# Patient Record
Sex: FEMALE | Race: WHITE | Employment: FULL TIME | URBAN - METROPOLITAN AREA
[De-identification: names, ages, dates, MRNs, and addresses within clinical notes are randomized per-mention and may not be internally consistent; named-entity substitution may affect disease eponyms.]

---

## 2017-06-16 RX ORDER — DROSPIRENONE AND ETHINYL ESTRADIOL 0.02-3(28)
KIT ORAL
Qty: 84 TAB | Refills: 0 | Status: SHIPPED | OUTPATIENT
Start: 2017-06-16 | End: 2019-07-19

## 2019-07-18 NOTE — PATIENT INSTRUCTIONS
Well Visit, Ages 25 to 48: Care Instructions  Your Care Instructions    Physical exams can help you stay healthy. Your doctor has checked your overall health and may have suggested ways to take good care of yourself. He or she also may have recommended tests. At home, you can help prevent illness with healthy eating, regular exercise, and other steps. Follow-up care is a key part of your treatment and safety. Be sure to make and go to all appointments, and call your doctor if you are having problems. It's also a good idea to know your test results and keep a list of the medicines you take. How can you care for yourself at home? · Reach and stay at a healthy weight. This will lower your risk for many problems, such as obesity, diabetes, heart disease, and high blood pressure. · Get at least 30 minutes of physical activity on most days of the week. Walking is a good choice. You also may want to do other activities, such as running, swimming, cycling, or playing tennis or team sports. Discuss any changes in your exercise program with your doctor. · Do not smoke or allow others to smoke around you. If you need help quitting, talk to your doctor about stop-smoking programs and medicines. These can increase your chances of quitting for good. · Talk to your doctor about whether you have any risk factors for sexually transmitted infections (STIs). Having one sex partner (who does not have STIs and does not have sex with anyone else) is a good way to avoid these infections. · Use birth control if you do not want to have children at this time. Talk with your doctor about the choices available and what might be best for you. · Protect your skin from too much sun. When you're outdoors from 10 a.m. to 4 p.m., stay in the shade or cover up with clothing and a hat with a wide brim. Wear sunglasses that block UV rays. Even when it's cloudy, put broad-spectrum sunscreen (SPF 30 or higher) on any exposed skin.   · See a dentist one or two times a year for checkups and to have your teeth cleaned. · Wear a seat belt in the car. · Drink alcohol in moderation, if at all. That means no more than 2 drinks a day for men and 1 drink a day for women. Follow your doctor's advice about when to have certain tests. These tests can spot problems early. For everyone  · Cholesterol. Have the fat (cholesterol) in your blood tested after age 21. Your doctor will tell you how often to have this done based on your age, family history, or other things that can increase your risk for heart disease. · Blood pressure. Have your blood pressure checked during a routine doctor visit. Your doctor will tell you how often to check your blood pressure based on your age, your blood pressure results, and other factors. · Vision. Talk with your doctor about how often to have a glaucoma test.  · Diabetes. Ask your doctor whether you should have tests for diabetes. · Colon cancer. Have a test for colon cancer at age 48. You may have one of several tests. If you are younger than 48, you may need a test earlier if you have any risk factors. Risk factors include whether you already had a precancerous polyp removed from your colon or whether your parent, brother, sister, or child has had colon cancer. For women  · Breast exam and mammogram. Talk to your doctor about when you should have a clinical breast exam and a mammogram. Medical experts differ on whether and how often women under 50 should have these tests. Your doctor can help you decide what is right for you. · Pap test and pelvic exam. Begin Pap tests at age 24. A Pap test is the best way to find cervical cancer. The test often is part of a pelvic exam. Ask how often to have this test.  · Tests for sexually transmitted infections (STIs). Ask whether you should have tests for STIs. You may be at risk if you have sex with more than one person, especially if your partners do not wear condoms.   For men  · Tests for sexually transmitted infections (STIs). Ask whether you should have tests for STIs. You may be at risk if you have sex with more than one person, especially if you do not wear a condom. · Testicular cancer exam. Ask your doctor whether you should check your testicles regularly. · Prostate exam. Talk to your doctor about whether you should have a blood test (called a PSA test) for prostate cancer. Experts differ on whether and when men should have this test. Some experts suggest it if you are older than 39 and are -American or have a father or brother who got prostate cancer when he was younger than 72. When should you call for help? Watch closely for changes in your health, and be sure to contact your doctor if you have any problems or symptoms that concern you. Where can you learn more? Go to http://amauri-keyla.info/. Enter P072 in the search box to learn more about \"Well Visit, Ages 25 to 48: Care Instructions. \"  Current as of: March 28, 2018  Content Version: 11.9  © 5729-9540 [employer]. Care instructions adapted under license by [employer] (which disclaims liability or warranty for this information). If you have questions about a medical condition or this instruction, always ask your healthcare professional. [place] [address] any warranty or liability for your use of this information. Learning About Infertility Testing  What is infertility testing? Infertility tests help find out why a woman cannot get pregnant. These tests include a physical exam, semen analysis, blood tests, and other procedures. Many of these tests are done in your doctor's office or clinic. Some other procedures may be done in a hospital.  What happens during testing? The first tests done include:  · Physical exam and medical history (both partners). · Blood or urine tests (both partners).  These tests check hormones and look for sexually transmitted infections. · Semen analysis (the man). This test checks the number of sperm and how they move. If the first tests don't find a problem, the woman may have one or more of these tests:  · Ultrasound. A pelvic ultrasound looks at the size and structure of the uterus and ovaries. · X-ray. A special kind of X-ray looks at the inside of the uterus and the fallopian tubes. · Laparoscopy. The doctor puts a thin, lighted scope through a small cut in the belly to see the organs inside. If the problem is still not found, other tests and procedures may be done. What can you expect after this testing? Sometimes tests can't find the problem. And not all infertility problems can be treated. But you may still be able to get pregnant with techniques like in vitro fertilization. What else should you know? Infertility tests can cost a lot. And they can be stressful and take a lot of time. Before you have these tests, talk with your partner about them. In some cases, you may not find a problem even after many tests. So it is important to decide how many tests you might want to try. Where can you learn more? Go to http://amauri-keyla.info/. Enter M460 in the search box to learn more about \"Learning About Infertility Testing. \"  Current as of: September 5, 2018  Content Version: 11.9  © 4776-1696 [employer]. Care instructions adapted under license by [employer] (which disclaims liability or warranty for this information). If you have questions about a medical condition or this instruction, always ask your healthcare professional. [place] [address] any warranty or liability for your use of this information.

## 2019-07-19 ENCOUNTER — OFFICE VISIT (OUTPATIENT)
Dept: OBGYN CLINIC | Age: 29
End: 2019-07-19

## 2019-07-19 VITALS
BODY MASS INDEX: 29.12 KG/M2 | HEIGHT: 65 IN | WEIGHT: 174.8 LBS | DIASTOLIC BLOOD PRESSURE: 72 MMHG | SYSTOLIC BLOOD PRESSURE: 114 MMHG

## 2019-07-19 DIAGNOSIS — R33.9 INCOMPLETE EMPTYING OF BLADDER: ICD-10-CM

## 2019-07-19 DIAGNOSIS — N89.8 VAGINA ITCHING: ICD-10-CM

## 2019-07-19 DIAGNOSIS — N97.9 INFERTILITY, FEMALE: ICD-10-CM

## 2019-07-19 DIAGNOSIS — Z01.411 ENCOUNTER FOR GYNECOLOGICAL EXAMINATION (GENERAL) (ROUTINE) WITH ABNORMAL FINDINGS: Primary | ICD-10-CM

## 2019-07-19 DIAGNOSIS — Z12.4 SCREENING FOR CERVICAL CANCER: ICD-10-CM

## 2019-07-19 LAB
BILIRUB UR QL STRIP: NEGATIVE
GLUCOSE UR-MCNC: NEGATIVE MG/DL
KETONES P FAST UR STRIP-MCNC: NEGATIVE MG/DL
PH UR STRIP: 5 [PH] (ref 4.6–8)
PROT UR QL STRIP: NEGATIVE
SP GR UR STRIP: 1 (ref 1–1.03)
UA UROBILINOGEN AMB POC: NORMAL (ref 0.2–1)
URINALYSIS CLARITY POC: CLEAR
URINALYSIS COLOR POC: NORMAL
URINE BLOOD POC: NEGATIVE
URINE LEUKOCYTES POC: NEGATIVE
URINE NITRITES POC: NEGATIVE
WET MOUNT POCT, WMPOCT: NORMAL

## 2019-07-19 RX ORDER — ATOMOXETINE 60 MG/1
60 CAPSULE ORAL DAILY
COMMUNITY
End: 2020-04-26

## 2019-07-19 NOTE — PROGRESS NOTES
[facility]  [email or web address]  [phone]    Carmen Orellana MD, FACOG       OB/GYN Problem visit    Chief Complaint:   Chief Complaint   Patient presents with    Well Woman     concerns about infertility for 2 years    Vaginal Itching     occassional vaginal itching after intercourse     Incomplete Bladder Emptying     \"always\"    Family Planning       History of Present Illness: This is a new problem being evaluated by this provider. The patient is a 26 y.o. [de-identified]  female who reports has been trying to conceive for 2 years. Having regular menses ranging from every 24 to 30 days. She also c/o incomplete emptying of urine for years. Pt co vaginal itching after intercourse. ?PCOS and prediabetes when younger, but no longer prediabetic after changed diet. +OPK: x1 attempt. Partner: no children, no tob, no mumps, no testicular surgery/trauma    LMP: Patient's last menstrual period was 06/11/2019 (exact date).     PFSH:  Past Medical History:   Diagnosis Date    ADD (attention deficit disorder)     Breast lump in female     Endorses provider felt left breast lump in 2017, pt denies ever feeling breast lump at anytime    Depression     Diabetes ([address]     pre-diabetes    Hypertension     Ulcerative colitis ([address]      Past Surgical History:   Procedure Laterality Date    COLONOSCOPY,DIAGNOSTIC  1/6/2016         HX ADENOIDECTOMY      age 2 or 1    HX TYMPANOSTOMY      had tubes placed twice during childhood    HX WISDOM TEETH EXTRACTION  2006     Family History   Problem Relation Age of Onset    Hypertension Father     Diabetes Father     Psychiatric Disorder Father     Psychiatric Disorder Mother     Diabetes Mother     Hypertension Mother     High Cholesterol Mother     Cancer Maternal Grandmother         lung    Psychiatric Disorder Maternal Grandmother     Heart Disease Maternal Grandmother     Diabetes Paternal Grandfather     Heart Disease Paternal Grandfather    Abena Boubacar Syndrome Maternal Uncle     Downs Syndrome Cousin      Social History     Tobacco Use    Smoking status: Never Smoker    Smokeless tobacco: Never Used   Substance Use Topics    Alcohol use: Yes     Alcohol/week: 0.8 standard drinks     Types: 1 Shots of liquor per week     Comment: occasional    Drug use: No     Allergies   Allergen Reactions    Augmentin [Amoxicillin-Pot Clavulanate] Other (comments)     Diarrhea, vomiting, heartburn and nausea     Current Outpatient Medications   Medication Sig    atomoxetine (STRATTERA) 60 mg capsule Take 60 mg by mouth daily.  amLODIPine (NORVASC) 2.5 mg tablet TAKE 1 TABLET BY MOUTH EVERY DAY     No current facility-administered medications for this visit. Review of Systems:  History obtained from the patient  Constitutional: negative for fevers, chills and weight loss  ENT ROS: negative for - hearing change, oral lesions or visual changes  Respiratory: negative for cough, wheezing or dyspnea on exertion  Cardiovascular: negative for chest pain, irregular heart beats, exertional chest pressure/discomfort  Gastrointestinal: negative for dysphagia, nausea and vomiting  Genito-Urinary ROS:  see HPI  Inteument/breast: negative for rash, breast lump and nipple discharge  Musculoskeletal:negative for stiff joints, neck pain and muscle weakness  Endocrine ROS: negative for - breast changes, galactorrhea or temperature intolerance  Hematological and Lymphatic ROS: negative for - blood clots, bruising or swollen lymph nodes    Physical Exam:  Visit Vitals  /72 (BP 1 Location: Left arm, BP Patient Position: Sitting)   Ht 5' 5\" (1.651 m)   Wt 174 lb 12.8 oz (79.3 kg)   BMI 29.09 kg/m²       GENERAL: alert, well appearing, and in no distress  HEAD: normocephalic, atraumatic.    PULM: clear to auscultation, no wheezes, rales or rhonchi, symmetric air entry   COR: normal rate and regular rhythm, S1 and S2 normal   ABDOMEN: soft, nontender, nondistended, no masses or organomegaly   EGBUS: no lesions, no inflammation, no masses  VULVA: normal appearing vulva with no masses, tenderness or lesions  VAGINA: normal appearing vagina with normal color, no lesions, white thin discharge  CERVIX: normal appearing cervix without discharge or lesions, non tender  UTERUS: uterus is normal size, shape, consistency and nontender   ADNEXA: normal adnexa in size, nontender and no masses  NEURO: alert, oriented, normal speech    Assessment:  Encounter Diagnoses   Name Primary?       Incomplete emptying of bladder     Vagina itching     Infertility, female    Dx: new problems to this provider that is moderate, data reviewed: none;  work up planned: labs, intervention: menstrual charting, timed intercourse, US, semen analysis        Plan:  The patient is advised that she should contact the office if she does not note improvement or if symptoms recur  Recommend follow up with PCP for non-gynecologic complaints and chronic medical problems. She should contact our office with any questions or concerns  She could keep her routine annual exam appointment. Disc good bladder hygiene, ur cx, consider PT. We discussed potential causes of vaginal discharge/irritation. We discussed good vulvar hygiene. Recommended avoid vaginal irritants. Discussed use of mild soaps/detergents. Follow up if NI. We reviewed wet prep findings with the patient at her visit. ACOG evaluation of infertility h/o given. Plan SA, US, day 20 progesterone. Consider PCOS labs at nv. Disc option of HSG: hold for now. We discussed timed intercourse, menstrual charting, and s/sx of ovulation. I recommended a daily prenatal vitamin. We discussed that if conception does not occur within one year then additional evaluation may be indicated.        I spent 20 minutes of face to face time counseling and discussing fertility, infertility, testing, vaginitis, urine complaints with the patient. More than 50 % of her visit was spent performing counseling. See WWE< requested same day as consult. Orders Placed This Encounter    CULTURE, URINE    PROGESTERONE    AMB POC URINALYSIS DIP STICK AUTO W/O MICRO    AMB POC WET PREP (AKA STAIN, INTERPRET, WET MOUNT)    PAP IG, CT-NG TV HPV 16&18,45 ([id number], [id number])       Results for orders placed or performed in visit on 07/19/19   CULTURE, URINE   Result Value Ref Range    Urine Culture, Routine       Culture shows less than 10,000 colony forming units of bacteria per  milliliter of urine. This colony count is not generally considered  to be clinically significant.       Narrative    Performed at:  [address] [place] [address]  [id number]  : April Mcdonald MD, Phone:  [phone]   AMB POC URINALYSIS DIP STICK AUTO W/O MICRO   Result Value Ref Range    Color (UA POC) Sherie     Clarity (UA POC) Clear     Glucose (UA POC) Negative Negative    Bilirubin (UA POC) Negative Negative    Ketones (UA POC) Negative Negative    Specific gravity (UA POC) 1.005 1.001 - 1.035    Blood (UA POC) Negative Negative    pH (UA POC) 5 4.6 - 8.0    Protein (UA POC) Negative Negative    Urobilinogen (UA POC) normal 0.2 - 1    Nitrites (UA POC) Negative Negative    Leukocyte esterase (UA POC) Negative Negative   AMB POC SMEAR, STAIN & INTERPRET, WET MOUNT   Result Value Ref Range    Wet mount (POC)      Narrative    NICOLA    Hypae: negative  Buds: negative    Wet Prep:  Trich: negative  Clue cells: negative  Hyphae: negative  Buds: negative  WBC's: normal

## 2019-07-19 NOTE — PROGRESS NOTES
[facility]  [email or web address]  [phone]    Jennifer Danielson MD, FACOG       Annual Gynecologic Exam:  WWE <40  Chief Complaint   Patient presents with    Well Woman     concerns about infertility for 2 years    Vaginal Itching     occassional vaginal itching after intercourse     Incomplete Bladder Emptying     \"always\"   [id number] Ayden Adair is a 26 y.o. No obstetric history on file. WHITE OR  female who presents for an annual well woman exam.  Patient's last menstrual period was 2019 (exact date). .    With regard to the Gardisil vaccine, she is older than the FDA approved age to receive it. She does report additional concerns today. Menstrual status:  Her periods are moderate to heavy, regular cycles, ranging from every 24-30 days. She does report dysmenorrhea/painful menses. She does not report irregular bleeding. Sexual history and Contraception:  Social History     Substance and Sexual Activity   Sexual Activity Yes    Partners: Male     She never use condoms with sexual activity  She does not reports new sexual partner(s) in the last year. The patient does not request STD testing. We recommended testing per CDC guidelines and at patient request.     Preventive Medicine History:  Her most recent Pap smear result: normal was obtained in 2016  She does not have a history of RIKKI 2, 3 or cervical cancer.      Past Medical History:   Diagnosis Date    ADD (attention deficit disorder)     Breast lump in female     Endorses provider felt left breast lump in , pt denies ever feeling breast lump at anytime    Depression     Diabetes ([address]     pre-diabetes    Hypertension     Ulcerative colitis ([address]      OB History    Para Term  AB Living   0 0 0 0 0 0   SAB TAB Ectopic Molar Multiple Live Births   0 0 0 0 0 0     Past Surgical History:   Procedure Laterality Date    COLONOSCOPY,DIAGNOSTIC  2016  HX ADENOIDECTOMY      age 2 or 1    HX TYMPANOSTOMY      had tubes placed twice during childhood    HX WISDOM TEETH EXTRACTION  2006     Family History   Problem Relation Age of Onset    Hypertension Father     Diabetes Father     Psychiatric Disorder Father     Psychiatric Disorder Mother     Diabetes Mother     Hypertension Mother     High Cholesterol Mother     Cancer Maternal Grandmother         lung    Psychiatric Disorder Maternal Grandmother     Heart Disease Maternal Grandmother     Diabetes Paternal Grandfather     Heart Disease Paternal Grandfather    Garrel Goodhue Syndrome Maternal Uncle     Downs Syndrome Cousin      Social History     Socioeconomic History    Marital status:      Spouse name: Not on file    Number of children: Not on file    Years of education: Not on file    Highest education level: Not on file   Occupational History    Not on file   Social Needs    Financial resource strain: Not on file    Food insecurity:     Worry: Not on file     Inability: Not on file    Transportation needs:     Medical: Not on file     Non-medical: Not on file   Tobacco Use    Smoking status: Never Smoker    Smokeless tobacco: Never Used   Substance and Sexual Activity    Alcohol use:  Yes     Alcohol/week: 0.8 standard drinks     Types: 1 Shots of liquor per week     Comment: occasional    Drug use: No    Sexual activity: Yes     Partners: Male   Lifestyle    Physical activity:     Days per week: Not on file     Minutes per session: Not on file    Stress: Not on file   Relationships    Social connections:     Talks on phone: Not on file     Gets together: Not on file     Attends Confucianist service: Not on file     Active member of club or organization: Not on file     Attends meetings of clubs or organizations: Not on file     Relationship status: Not on file    Intimate partner violence:     Fear of current or ex partner: Not on file     Emotionally abused: Not on file Physically abused: Not on file     Forced sexual activity: Not on file   Other Topics Concern    Not on file   Social History Narrative    Not on file       Allergies   Allergen Reactions    Augmentin [Amoxicillin-Pot Clavulanate] Other (comments)     Diarrhea, vomiting, heartburn and nausea       Current Outpatient Medications   Medication Sig    atomoxetine (STRATTERA) 60 mg capsule Take 60 mg by mouth daily.  amLODIPine (NORVASC) 2.5 mg tablet TAKE 1 TABLET BY MOUTH EVERY DAY     No current facility-administered medications for this visit.         Patient Active Problem List   Diagnosis Code    ADD (attention deficit disorder) F98.8    Depression F32.9    Sepsis ([facility] [address] A41.9       Review of Systems - History obtained from the patient  Constitutional: negative for weight loss, fever, night sweats  HEENT: positive for hearing loss, earache, congestion, snoring, sorethroat  CV: negative for chest pain, palpitations, edema  Resp: negative for cough, shortness of breath, wheezing  GI: positive for change in bowel habits, abdominal pain, black or bloody stools  : positive for frequency, dysuria, hematuria, incomplete emptying after urination \"always\"  GYN: see HPI  MSK: negative for back pain, joint pain, muscle pain  Breast: negative for breast lumps, nipple discharge, galactorrhea  Skin :negative for itching, rash, hives  Neuro: negative for dizziness, headache, confusion, weakness  Psych: negative for anxiety, depression, change in mood  Heme/lymph: negative for bleeding, bruising, pallor    Physical Exam  Visit Vitals  /72 (BP 1 Location: Left arm, BP Patient Position: Sitting)   Ht 5' 5\" (1.651 m)   Wt 174 lb 12.8 oz (79.3 kg)   LMP 06/11/2019 (Exact Date)   BMI 29.09 kg/m²       Constitutional  · Appearance: well-nourished, well developed, alert, in no acute distress    HENT  · Head and Face: appears normal    Neck  · Inspection/Palpation: normal appearance, no masses or tenderness  · Lymph Nodes: no lymphadenopathy present  · Thyroid: gland size normal, nontender, no nodules or masses present on palpation    Chest  · Respiratory Effort: breathing unlabored  · Auscultation: normal breath sounds    Cardiovascular  · Heart:  · Auscultation: regular rate and rhythm without murmur    Breasts  · Inspection of Breasts: breasts symmetrical, no skin changes, no discharge present, nipple appearance normal, no skin retraction present  · Palpation of Breasts and Axillae: no masses present on palpation, no breast tenderness  · Axillary Lymph Nodes: no lymphadenopathy present    Gastrointestinal  · Abdominal Examination: abdomen non-tender to palpation, normal bowel sounds, no masses present  · Liver and spleen: no hepatomegaly present, spleen not palpable  · Hernias: no hernias identified    Genitourinary  · External Genitalia: normal appearance for age, no discharge present, no tenderness present, no inflammatory lesions present, no masses present  · Vagina: normal vaginal vault without central or paravaginal defects, thin white discharge present, no inflammatory lesions present, no masses present  · Bladder: non-tender to palpation  · Urethra: appears normal  · Cervix: normal   · Uterus: normal size, shape and consistency  · Adnexa: no adnexal tenderness present, no adnexal masses present  · Perineum: perineum within normal limits, no evidence of trauma, no rashes or skin lesions present  · Anus: anus within normal limits, no hemorrhoids present  · Inguinal Lymph Nodes: no lymphadenopathy present    Skin  · General Inspection: no rash, no lesions identified    Neurologic/Psychiatric  · Mental Status:  · Orientation: grossly oriented to person, place and time  · Mood and Affect: mood normal, affect appropriate    Assessment:  26 y.o. No obstetric history on file. for well woman exam  Encounter Diagnoses   Name Primary?     Screening for cervical cancer     Incomplete emptying of bladder     Vagina itching     Infertility, female     Encounter for gynecological examination (general) (routine) with abnormal findings Yes       Plan:  The patient was counseled about diet, exercise, healthy lifestyle  We discussed self breast exam  We discussed safer sex practices, condom use and risk factors for sexually transmitted diseases. We discussed current pap smear and HR HPV testing guidelines. We recommend follow up one year for routine annual gynecologic exam or sooner prn  We recommend routine follow up with her primary care doctor for management of chronic medical problems and non-gynecologic concerns  Handouts were given to the patient  We discussed calcium/vitamin D/weight bearing exercise and osteoporosis prevention    See other consultation with problem note    Folllow up:  [x] return for annual well woman exam in one year or sooner if she is having problems  [] follow up and ultrasound  [] 6 months  [] 3 months  [] 6 weeks   [] 1 month    Orders Placed This Encounter    CULTURE, URINE    PROGESTERONE    AMB POC URINALYSIS DIP STICK AUTO W/O MICRO    AMB POC WET PREP (AKA STAIN, INTERPRET, WET MOUNT)    PAP IG, CT-NG TV HPV 16&18,45 ([id number], [id number])       Results for orders placed or performed in visit on 07/19/19   CULTURE, URINE   Result Value Ref Range    Urine Culture, Routine       Culture shows less than 10,000 colony forming units of bacteria per  milliliter of urine. This colony count is not generally considered  to be clinically significant.       Narrative    Performed at:  [address] [place] [address]  [id number]  : Cruz Ortega MD, Phone:  [phone]   AMB POC URINALYSIS DIP STICK AUTO W/O MICRO   Result Value Ref Range    Color (UA POC) Sherie     Clarity (UA POC) Clear     Glucose (UA POC) Negative Negative    Bilirubin (UA POC) Negative Negative    Ketones (UA POC) Negative Negative    Specific gravity (UA POC) 1.005 1.001 - 1.035    Blood (UA POC) Negative Negative    pH (UA POC) 5 4.6 - 8.0    Protein (UA POC) Negative Negative    Urobilinogen (UA POC) normal 0.2 - 1    Nitrites (UA POC) Negative Negative    Leukocyte esterase (UA POC) Negative Negative   AMB POC SMEAR, STAIN & INTERPRET, WET MOUNT   Result Value Ref Range    Wet mount (POC)      Narrative    NICOLA    Hypae: negative  Buds: negative    Wet Prep:  Trich: negative  Clue cells: negative  Hyphae: negative  Buds: negative  WBC's: normal

## 2019-07-19 NOTE — PROGRESS NOTES
[age]. No tob. No testicular problems. [age]  ? pcos/prediabetes. Left br lump  2-6 yrs.     Pap: abnl 2014-15/bacterial.    Joseph Klinefelter

## 2019-07-20 LAB — BACTERIA UR CULT: NORMAL

## 2019-07-24 LAB
C TRACH RRNA CVX QL NAA+PROBE: NEGATIVE
CYTOLOGIST CVX/VAG CYTO: NORMAL
CYTOLOGY CVX/VAG DOC CYTO: NORMAL
CYTOLOGY CVX/VAG DOC THIN PREP: NORMAL
DX ICD CODE: NORMAL
HPV I/H RISK 1 DNA CVX QL PROBE+SIG AMP: NEGATIVE
Lab: NORMAL
N GONORRHOEA RRNA CVX QL NAA+PROBE: NEGATIVE
OTHER STN SPEC: NORMAL
STAT OF ADQ CVX/VAG CYTO-IMP: NORMAL
T VAGINALIS RRNA SPEC QL NAA+PROBE: NEGATIVE

## 2019-07-24 NOTE — PROGRESS NOTES
[facility]  [email or web address]  [phone]    Martha Wilks MD, FACOG       Annual Gynecologic Exam:  WWE <40  Chief Complaint   Patient presents with    Well Woman     concerns about infertility for 2 years    Vaginal Itching     occassional vaginal itching after intercourse     Incomplete Bladder Emptying     \"always\"   [id number] Ayden Adair is a 26 y.o. No obstetric history on file. WHITE OR  female who presents for an annual well woman exam.  Patient's last menstrual period was 2019 (exact date). .    With regard to the Gardisil vaccine, she is older than the FDA approved age to receive it. She does report additional concerns today, see other consult note. Menstrual status:  Her periods are moderate to heavy, regular cycles, ranging from every 24-30 days. She does report dysmenorrhea/painful menses. She does not report irregular bleeding. Sexual history and Contraception:  Social History     Substance and Sexual Activity   Sexual Activity Yes    Partners: Male     She never use condoms with sexual activity  She does not reports new sexual partner(s) in the last year. The patient does not request STD testing. We recommended testing per CDC guidelines and at patient request.     Preventive Medicine History:  Her most recent Pap smear result: normal was obtained in 2016  She does not have a history of RIKKI 2, 3 or cervical cancer.      Past Medical History:   Diagnosis Date    ADD (attention deficit disorder)     Breast lump in female     Endorses provider felt left breast lump in , pt denies ever feeling breast lump at anytime    Depression     Diabetes ([address]     pre-diabetes    Hypertension     Ulcerative colitis ([address]      OB History    Para Term  AB Living   0 0 0 0 0 0   SAB TAB Ectopic Molar Multiple Live Births   0 0 0 0 0 0     Past Surgical History:   Procedure Laterality Date    COLONOSCOPY,DIAGNOSTIC  1/6/2016         HX ADENOIDECTOMY      age 2 or 1    HX TYMPANOSTOMY      had tubes placed twice during childhood    HX WISDOM TEETH EXTRACTION  2006     Family History   Problem Relation Age of Onset    Hypertension Father     Diabetes Father     Psychiatric Disorder Father     Psychiatric Disorder Mother     Diabetes Mother     Hypertension Mother     High Cholesterol Mother     Cancer Maternal Grandmother         lung    Psychiatric Disorder Maternal Grandmother     Heart Disease Maternal Grandmother     Diabetes Paternal Grandfather     Heart Disease Paternal Grandfather    Cinthia Boozer Syndrome Maternal Uncle     Downs Syndrome Cousin      Social History     Socioeconomic History    Marital status:      Spouse name: Not on file    Number of children: Not on file    Years of education: Not on file    Highest education level: Not on file   Occupational History    Not on file   Social Needs    Financial resource strain: Not on file    Food insecurity:     Worry: Not on file     Inability: Not on file    Transportation needs:     Medical: Not on file     Non-medical: Not on file   Tobacco Use    Smoking status: Never Smoker    Smokeless tobacco: Never Used   Substance and Sexual Activity    Alcohol use:  Yes     Alcohol/week: 0.8 standard drinks     Types: 1 Shots of liquor per week     Comment: occasional    Drug use: No    Sexual activity: Yes     Partners: Male   Lifestyle    Physical activity:     Days per week: Not on file     Minutes per session: Not on file    Stress: Not on file   Relationships    Social connections:     Talks on phone: Not on file     Gets together: Not on file     Attends Hoahaoism service: Not on file     Active member of club or organization: Not on file     Attends meetings of clubs or organizations: Not on file     Relationship status: Not on file    Intimate partner violence:     Fear of current or ex partner: Not on file Emotionally abused: Not on file     Physically abused: Not on file     Forced sexual activity: Not on file   Other Topics Concern    Not on file   Social History Narrative    Not on file       Allergies   Allergen Reactions    Augmentin [Amoxicillin-Pot Clavulanate] Other (comments)     Diarrhea, vomiting, heartburn and nausea       Current Outpatient Medications   Medication Sig    atomoxetine (STRATTERA) 60 mg capsule Take 60 mg by mouth daily.  amLODIPine (NORVASC) 2.5 mg tablet TAKE 1 TABLET BY MOUTH EVERY DAY     No current facility-administered medications for this visit.         Patient Active Problem List   Diagnosis Code    ADD (attention deficit disorder) F98.8    Depression F32.9    Sepsis ([facility] [address] A41.9       Review of Systems - History obtained from the patient  Constitutional: negative for weight loss, fever, night sweats  HEENT: positive for hearing loss, earache, congestion, snoring, sorethroat  CV: negative for chest pain, palpitations, edema  Resp: negative for cough, shortness of breath, wheezing  GI: positive for change in bowel habits, abdominal pain, black or bloody stools  : positive for frequency, dysuria, hematuria, incomplete emptying after urination \"always\"  GYN: see HPI  MSK: negative for back pain, joint pain, muscle pain  Breast: negative for breast lumps, nipple discharge, galactorrhea  Skin :negative for itching, rash, hives  Neuro: negative for dizziness, headache, confusion, weakness  Psych: negative for anxiety, depression, change in mood  Heme/lymph: negative for bleeding, bruising, pallor    Physical Exam  Visit Vitals  /72 (BP 1 Location: Left arm, BP Patient Position: Sitting)   Ht 5' 5\" (1.651 m)   Wt 174 lb 12.8 oz (79.3 kg)   LMP 06/11/2019 (Exact Date)   BMI 29.09 kg/m²       Constitutional  · Appearance: well-nourished, well developed, alert, in no acute distress    HENT  · Head and Face: appears normal    Neck  · Inspection/Palpation: normal appearance, no masses or tenderness  · Lymph Nodes: no lymphadenopathy present  · Thyroid: gland size normal, nontender, no nodules or masses present on palpation    Chest  · Respiratory Effort: breathing unlabored  · Auscultation: normal breath sounds    Cardiovascular  · Heart:  · Auscultation: regular rate and rhythm without murmur    Breasts  · Inspection of Breasts: breasts symmetrical, no skin changes, no discharge present, nipple appearance normal, no skin retraction present  · Palpation of Breasts and Axillae: no masses present on palpation, no breast tenderness  · Axillary Lymph Nodes: no lymphadenopathy present    Gastrointestinal  · Abdominal Examination: abdomen non-tender to palpation, normal bowel sounds, no masses present  · Liver and spleen: no hepatomegaly present, spleen not palpable  · Hernias: no hernias identified    Genitourinary  · External Genitalia: normal appearance for age, no discharge present, no tenderness present, no inflammatory lesions present, no masses present  · Vagina: normal vaginal vault without central or paravaginal defects, thin white discharge present, no inflammatory lesions present, no masses present  · Bladder: non-tender to palpation  · Urethra: appears normal  · Cervix: normal   · Uterus: normal size, shape and consistency  · Adnexa: no adnexal tenderness present, no adnexal masses present  · Perineum: perineum within normal limits, no evidence of trauma, no rashes or skin lesions present  · Anus: anus within normal limits, no hemorrhoids present  · Inguinal Lymph Nodes: no lymphadenopathy present    Skin  · General Inspection: no rash, no lesions identified    Neurologic/Psychiatric  · Mental Status:  · Orientation: grossly oriented to person, place and time  · Mood and Affect: mood normal, affect appropriate    Assessment:  26 y.o. No obstetric history on file.  for well woman exam    Plan:  The patient was counseled about diet, exercise, healthy lifestyle  We discussed self breast exam  We discussed safer sex practices, condom use and risk factors for sexually transmitted diseases. We discussed current pap smear and HR HPV testing guidelines. We recommend follow up one year for routine annual gynecologic exam or sooner prn  We recommend routine follow up with her primary care doctor for management of chronic medical problems and non-gynecologic concerns  Handouts were given to the patient  We discussed calcium/vitamin D/weight bearing exercise and osteoporosis prevention    See other consultation with problem note    Folllow up:  [x] return for annual well woman exam in one year or sooner if she is having problems  [] follow up and ultrasound  [] 6 months  [] 3 months  [] 6 weeks   [] 1 month    Orders Placed This Encounter    CULTURE, URINE    PROGESTERONE    AMB POC URINALYSIS DIP STICK AUTO W/O MICRO    AMB POC WET PREP (AKA STAIN, INTERPRET, WET MOUNT)    PAP IG, CT-NG TV HPV 16&18,45 ([id number], [id number])       Results for orders placed or performed in visit on 07/19/19   CULTURE, URINE   Result Value Ref Range    Urine Culture, Routine       Culture shows less than 10,000 colony forming units of bacteria per  milliliter of urine. This colony count is not generally considered  to be clinically significant.       Narrative    Performed at:  [address] [place] [address]  [id number]  : Harpal Casiano MD, Phone:  [phone]   AMB POC URINALYSIS DIP STICK AUTO W/O MICRO   Result Value Ref Range    Color (UA POC) Sherie     Clarity (UA POC) Clear     Glucose (UA POC) Negative Negative    Bilirubin (UA POC) Negative Negative    Ketones (UA POC) Negative Negative    Specific gravity (UA POC) 1.005 1.001 - 1.035    Blood (UA POC) Negative Negative    pH (UA POC) 5 4.6 - 8.0    Protein (UA POC) Negative Negative    Urobilinogen (UA POC) normal 0.2 - 1    Nitrites (UA POC) Negative Negative    Leukocyte esterase (UA POC) Negative Negative   AMB POC SMEAR, STAIN & INTERPRET, WET MOUNT   Result Value Ref Range    Wet mount (POC)      Narrative    NICOLA    Hypae: negative  Buds: negative    Wet Prep:  Trich: negative  Clue cells: negative  Hyphae: negative  Buds: negative  WBC's: normal

## 2019-07-31 LAB — PROGEST SERPL-MCNC: 9.8 NG/ML

## 2019-08-15 NOTE — PROGRESS NOTES
Patient aware of results and MD recommendations by phone.   Patient states that her partner has not had the SA yet as he is dealing with his job problems and we will be contacted once he has scheduled the appt for the SA.

## 2019-08-20 NOTE — PATIENT INSTRUCTIONS
Learning About Planning for Future Pregnancy  How can you plan for pregnancy? Even before you get pregnant, you can help make your pregnancy as healthy as possible. Take these steps:  · See a doctor or certified nurse-midwife for an exam. Talk about the medicines, vitamins, and herbs you take. Discuss any health problems or concerns you have. · Don't take nonsteroidal anti-inflammatory drugs (NSAIDs). Examples of these are ibuprofen and aspirin. They can raise your risk of miscarriage. This risk is higher around the time you conceive or if you use them for more than a week. · Take a daily multivitamin or prenatal vitamin. Make sure it has folic acid. This will lower the chance of having a baby with a birth defect. · Keep track of your menstrual cycle. This is a good idea for a few reasons. It helps you know the best time to try to get pregnant. And it can help your doctor or midwife figure out when your baby is due and how it is growing. · Make healthy choices. Eat well. Avoid caffeine. Or cut back and only have 1 cup of coffee or tea a day. Avoid alcohol, cigarettes, and illegal drugs. Take only the medicines your doctor or midwife says are okay. · Get plenty of exercise. A strong body will make pregnancy and birth easier. It will also help you recover after the birth. And exercise can help improve your mood. What other exams or tests should you have? Before trying to get pregnant, take care of any other health concerns you might have. · If you have diabetes or high blood pressure or you are obese, talk to your doctor before you get pregnant. Together, you can make a plan about how to control these health problems. · Talk with your doctor about any medicines you take. Find out if it is safe to keep taking them while you are pregnant. · Get any vaccines you might need. They can help prevent birth defects, miscarriage, or stillbirth that can be caused by infections such as rubella or measles.  Ask your doctor how long you should wait after you get a vaccine before you try to get pregnant. · Talk with your doctor about whether to have screening tests for diseases that are passed down through families (genetic disorders). These diseases include:  ? Cystic fibrosis. ? Sickle cell disease. ? Josué-Sachs disease. If you think you might be pregnant  · You can use a home pregnancy test as soon as the first day of your first missed menstrual period. · As soon as you know you're pregnant, make an appointment with your doctor or certified nurse-midwife. Your first prenatal visit will provide information that can be used to check for any problems as your pregnancy progresses. Where can you learn more? Go to http://amauri-keyla.info/. Enter M352 in the search box to learn more about \"Learning About Planning for Future Pregnancy. \"  Current as of: September 5, 2018  Content Version: 12.1  © 9228-3331 [employer]. Care instructions adapted under license by [employer] (which disclaims liability or warranty for this information). If you have questions about a medical condition or this instruction, always ask your healthcare professional. [address] any warranty or liability for your use of this information.

## 2019-08-21 ENCOUNTER — OFFICE VISIT (OUTPATIENT)
Dept: OBGYN CLINIC | Age: 29
End: 2019-08-21

## 2019-08-21 VITALS
WEIGHT: 176.2 LBS | SYSTOLIC BLOOD PRESSURE: 120 MMHG | DIASTOLIC BLOOD PRESSURE: 62 MMHG | BODY MASS INDEX: 29.36 KG/M2 | HEIGHT: 65 IN

## 2019-08-21 DIAGNOSIS — Z31.69 INFERTILITY COUNSELING: Primary | ICD-10-CM

## 2019-08-21 NOTE — PROGRESS NOTES
[address] [facility]  [email or web address]    Jimmy MD Maik, [address] [facility]       OB/GYN Follow-up visit    Chief Complaint: Follow up visit  Chief Complaint   Patient presents with   [facility]    Ultrasound       History of Present Illness: This is a follow up visit from 7-19-19 annual Exam.  She is having a follow up for infertility. May be switching to lower risk job. Partner works in law enforcement and has not done SA yet. LMP: Patient's last menstrual period was 08/07/2019. PFSH:  Past Medical History:   Diagnosis Date    ADD (attention deficit disorder)     Breast lump in female     Endorses provider felt left breast lump in 2017, pt denies ever feeling breast lump at anytime    Depression     Diabetes ([facility] [address]     pre-diabetes    Hypertension     Pap smear for cervical cancer screening 07/19/2019    Negative, HPV negative    Ulcerative colitis ([facility] [address]      Past Surgical History:   Procedure Laterality Date    COLONOSCOPY,DIAGNOSTIC  1/6/2016         HX ADENOIDECTOMY      age 2 or 1    HX TYMPANOSTOMY      had tubes placed twice during childhood    HX WISDOM TEETH EXTRACTION  2006     Family History   Problem Relation Age of Onset    Hypertension Father     Diabetes Father     Psychiatric Disorder Father     Psychiatric Disorder Mother     Diabetes Mother     Hypertension Mother     High Cholesterol Mother     Cancer Maternal Grandmother         lung    Psychiatric Disorder Maternal Grandmother     Heart Disease Maternal Grandmother     Diabetes Paternal Grandfather     Heart Disease Paternal Grandfather    Penny May Syndrome Maternal Uncle     Downs Syndrome Cousin      Social History     Tobacco Use    Smoking status: Never Smoker    Smokeless tobacco: Never Used   Substance Use Topics    Alcohol use:  Yes     Alcohol/week: 0.8 standard drinks     Types: 1 Shots of liquor per week     Comment: occasional    Drug use: No     Allergies   Allergen Reactions    Augmentin [Amoxicillin-Pot Clavulanate] Other (comments)     Diarrhea, vomiting, heartburn and nausea     Current Outpatient Medications   Medication Sig    atomoxetine (STRATTERA) 60 mg capsule Take 60 mg by mouth daily.  amLODIPine (NORVASC) 2.5 mg tablet TAKE 1 TABLET BY MOUTH EVERY DAY     No current facility-administered medications for this visit.         Review of Systems:  History obtained from the patient  Constitutional: negative for fevers, chills and weight loss  ENT ROS: negative for - hearing change, oral lesions or visual changes  Respiratory: negative for cough, wheezing or dyspnea on exertion  Cardiovascular: negative for chest pain, irregular heart beats, exertional chest pressure/discomfort  Gastrointestinal: negative for dysphagia, nausea and vomiting  Genito-Urinary ROS: see hpi  Inteument/breast: negative for rash, breast lump and nipple discharge  Musculoskeletal:negative for stiff joints, neck pain and muscle weakness  Endocrine ROS: negative for - breast changes, galactorrhea or temperature intolerance  Hematological and Lymphatic ROS: negative for - blood clots, bruising or swollen lymph nodes    Physical Exam:  Visit Vitals  /62 (BP 1 Location: Left arm, BP Patient Position: Sitting)   Ht 5' 5\" (1.651 m)   Wt 176 lb 3.2 oz (79.9 kg)   BMI 29.32 kg/m²       GENERAL: alert, well appearing, and in no distress  PULM: clear to auscultation, no wheezes, rales or rhonchi, symmetric air entry   COR: normal rate and regular rhythm, S1 and S2 normal   ABDOMEN: soft, nontender, nondistended, no masses or organomegaly   EGBUS: no lesions, no inflammation, no masses  VULVA: normal appearing vulva with no masses, tenderness or lesions  VAGINA: normal appearing vagina with normal color, no lesions, no discharge  CERVIX: normal appearing cervix without discharge or lesions, non tender  UTERUS: uterus is normal size, shape, consistency and nontender   ADNEXA: normal adnexa in size, nontender and no masses  NEURO: alert, oriented, normal speech    Assessment:  Encounter Diagnosis   Name Primary?  Infertility counseling Yes       Plan:  The patient is advised that she should contact the office with any questions or concerns. She should make her routine annual gynecologic appointment if needed. I spent 15 minutes of face to face time counseling and discussing fertility, clomid/femara, SA, fertility testing, US, fertility treatment with the patient. More than 50 % of her visit was spent performing counseling. Clomid h/o  Disc us findings, suspect benign cyst (ov/FT)        No orders of the defined types were placed in this encounter. No results found for this visit on 08/21/19. Lopez Metzger MD    Physician review of ultrasound performed by technician    Today's ultrasound report and images were reviewed and discussed with the patient. Please see images and imaging report entered by technician in PACS for more detail and progress note and diagnosis entered by MD.  UTERUS IS ANTEVERTED, NORMAL IN SIZE AND ECHOGENICITY. ENDOMETRIUM MEASURES 10-11MM IN THICKNESS. NO EVIDENCE OF MASS OR ABNORMALITY SEEN  WITHIN THE ENDOMETRIAL CAVITY. RIGHT OVARY APPEARS WITHIN NORMAL LIMITS. THERE APPEARS TO BE A CYSTIC AREA LATERAL TO THE RIGHT OVARY IN THE RIGHT ADNEXA THAT  MEASURES 2.3 X 1.4 X 1.4CM. THIS COULD BE OVARIAN. LEFT OVARY APPEARS WITHIN NORMAL LIMITS. FREE FLUID SEEN IN THE CDS.   Kenia Gomez MD

## 2020-04-26 ENCOUNTER — HOSPITAL ENCOUNTER (EMERGENCY)
Age: 30
Discharge: HOME | End: 2020-04-26
Attending: EMERGENCY MEDICINE
Payer: COMMERCIAL

## 2020-04-26 ENCOUNTER — APPOINTMENT (OUTPATIENT)
Dept: GENERAL RADIOLOGY | Age: 30
End: 2020-04-26
Attending: EMERGENCY MEDICINE
Payer: COMMERCIAL

## 2020-04-26 ENCOUNTER — APPOINTMENT (OUTPATIENT)
Dept: ULTRASOUND IMAGING | Age: 30
End: 2020-04-26
Attending: EMERGENCY MEDICINE
Payer: COMMERCIAL

## 2020-04-26 VITALS
BODY MASS INDEX: 29.16 KG/M2 | OXYGEN SATURATION: 100 % | RESPIRATION RATE: 16 BRPM | TEMPERATURE: 98.9 F | WEIGHT: 175 LBS | SYSTOLIC BLOOD PRESSURE: 115 MMHG | HEIGHT: 65 IN | HEART RATE: 76 BPM | DIASTOLIC BLOOD PRESSURE: 74 MMHG

## 2020-04-26 DIAGNOSIS — S39.83XA PELVIC STRADDLE INJURY, INITIAL ENCOUNTER: ICD-10-CM

## 2020-04-26 DIAGNOSIS — N90.89 HEMATOMA OF LABIA MAJORA: Primary | ICD-10-CM

## 2020-04-26 LAB
BASOPHILS # BLD: 0.1 K/UL (ref 0–0.1)
BASOPHILS NFR BLD: 1 % (ref 0–1)
DIFFERENTIAL METHOD BLD: ABNORMAL
EOSINOPHIL # BLD: 0.1 K/UL (ref 0–0.4)
EOSINOPHIL NFR BLD: 1 % (ref 0–7)
ERYTHROCYTE [DISTWIDTH] IN BLOOD BY AUTOMATED COUNT: 13.1 % (ref 11.5–14.5)
HCG SERPL QL: NEGATIVE
HCT VFR BLD AUTO: 39.2 % (ref 35–47)
HGB BLD-MCNC: 13.3 G/DL (ref 11.5–16)
IMM GRANULOCYTES # BLD AUTO: 0.1 K/UL (ref 0–0.04)
IMM GRANULOCYTES NFR BLD AUTO: 1 % (ref 0–0.5)
LYMPHOCYTES # BLD: 1.5 K/UL (ref 0.8–3.5)
LYMPHOCYTES NFR BLD: 10 % (ref 12–49)
MCH RBC QN AUTO: 30.5 PG (ref 26–34)
MCHC RBC AUTO-ENTMCNC: 33.9 G/DL (ref 30–36.5)
MCV RBC AUTO: 89.9 FL (ref 80–99)
MONOCYTES # BLD: 0.6 K/UL (ref 0–1)
MONOCYTES NFR BLD: 4 % (ref 5–13)
NEUTS SEG # BLD: 12.8 K/UL (ref 1.8–8)
NEUTS SEG NFR BLD: 83 % (ref 32–75)
NRBC # BLD: 0 K/UL (ref 0–0.01)
NRBC BLD-RTO: 0 PER 100 WBC
PLATELET # BLD AUTO: 279 K/UL (ref 150–400)
PMV BLD AUTO: 11.6 FL (ref 8.9–12.9)
RBC # BLD AUTO: 4.36 M/UL (ref 3.8–5.2)
WBC # BLD AUTO: 15.1 K/UL (ref 3.6–11)

## 2020-04-26 PROCEDURE — 99284 EMERGENCY DEPT VISIT MOD MDM: CPT

## 2020-04-26 PROCEDURE — 75810000293 HC SIMP/SUPERF WND  RPR

## 2020-04-26 PROCEDURE — 36415 COLL VENOUS BLD VENIPUNCTURE: CPT

## 2020-04-26 PROCEDURE — 76856 US EXAM PELVIC COMPLETE: CPT

## 2020-04-26 PROCEDURE — 72170 X-RAY EXAM OF PELVIS: CPT

## 2020-04-26 PROCEDURE — 85025 COMPLETE CBC W/AUTO DIFF WBC: CPT

## 2020-04-26 PROCEDURE — 84703 CHORIONIC GONADOTROPIN ASSAY: CPT

## 2020-04-26 PROCEDURE — 74011250637 HC RX REV CODE- 250/637: Performed by: EMERGENCY MEDICINE

## 2020-04-26 RX ORDER — OXYCODONE HYDROCHLORIDE 5 MG/1
10 TABLET ORAL
Status: COMPLETED | OUTPATIENT
Start: 2020-04-26 | End: 2020-04-26

## 2020-04-26 RX ORDER — OXYCODONE HYDROCHLORIDE 10 MG/1
10 TABLET ORAL
Qty: 9 TAB | Refills: 0 | Status: SHIPPED | OUTPATIENT
Start: 2020-04-26 | End: 2020-04-29

## 2020-04-26 RX ORDER — BUPROPION HYDROCHLORIDE 75 MG/1
75 TABLET ORAL DAILY
COMMUNITY
End: 2020-12-17

## 2020-04-26 RX ADMIN — OXYCODONE 10 MG: 5 TABLET ORAL at 11:14

## 2020-04-26 NOTE — ED PROVIDER NOTES
EMERGENCY DEPARTMENT HISTORY AND PHYSICAL EXAM      Date: 4/26/2020  Patient Name: Leatha Walter    History of Presenting Illness     Chief Complaint   Patient presents with    Fall     Patient states she tripped today and injured pelvic area. Pt denies hitting head, no LOC, no injury to any other body parts. Pt denies CP, SOB, cough, or fevers    Vaginal Bleeding     Pt having vaginal bleeding and pelvic pain d/t her fall       History Provided By: Patient    HPI: Leatha Walter, 31 y.o. female presents to the ED with history of ulcerative colitis, hypertension, prediabetes here with external vaginal pain after a fall backward onto the wall of a baby check warmer that she had in her house. She had remove the warmer and went to leave the room, pulled on the door which suddenly tilted back, prompting her to fall back onto the warmer. She did not burn herself but cut her labia/buttock area and has had swelling to the external left vagina. She went to [facility] but was advised to come to the emergency department with concern for a left labia majora hematoma. She has not urinated since that time but is hardly drink anything and has no urge to urinate this time. She has not had any bleeding from the vaginal area. She denies any abdominal or pelvic pain, did not hear any popping or cracking and she fell back onto her inferior backside. She is having 6 out of 10 pain and would like something for pain. She has been applying ice since her urgent care visit. Last tetanus shot was 4 years ago. Patient thinks she did not hurt herself severely when she fell and immediately got back up but had pain. There are no other complaints, changes, or physical findings at this time. PCP: Kristine Gonzalez MD    No current facility-administered medications on file prior to encounter.       Current Outpatient Medications on File Prior to Encounter   Medication Sig Dispense Refill    buPROPion (WELLBUTRIN) 75 mg tablet Take 75 mg by mouth daily.  [DISCONTINUED] atomoxetine (STRATTERA) 60 mg capsule Take 60 mg by mouth daily.  [DISCONTINUED] amLODIPine (NORVASC) 2.5 mg tablet TAKE 1 TABLET BY MOUTH EVERY DAY 30 Tab 2       Past History     Past Medical History:  Past Medical History:   Diagnosis Date    ADD (attention deficit disorder)     Breast lump in female     Endorses provider felt left breast lump in 2017, pt denies ever feeling breast lump at anytime    Depression     Diabetes ([facility] [address]     pre-diabetes    Hypertension     Pap smear for cervical cancer screening 07/19/2019    Negative, HPV negative    Ulcerative colitis ([facility] [address]        Past Surgical History:  Past Surgical History:   Procedure Laterality Date    COLONOSCOPY,DIAGNOSTIC  1/6/2016         HX ADENOIDECTOMY      age 2 or 1    HX TYMPANOSTOMY      had tubes placed twice during childhood    HX WISDOM TEETH EXTRACTION  2006       Family History:  Family History   Problem Relation Age of Onset    Hypertension Father     Diabetes Father     Psychiatric Disorder Father     Psychiatric Disorder Mother     Diabetes Mother     Hypertension Mother     High Cholesterol Mother     Cancer Maternal Grandmother         lung    Psychiatric Disorder Maternal Grandmother     Heart Disease Maternal Grandmother     Diabetes Paternal Grandfather     Heart Disease Paternal Grandfather    Marin Grills Syndrome Maternal Uncle     Downs Syndrome Cousin        Social History:  Social History     Tobacco Use    Smoking status: Never Smoker    Smokeless tobacco: Never Used   Substance Use Topics    Alcohol use: Yes     Alcohol/week: 0.8 standard drinks     Types: 1 Shots of liquor per week     Comment: occasional    Drug use: No       Allergies:   Allergies   Allergen Reactions    Augmentin [Amoxicillin-Pot Clavulanate] Other (comments)     Diarrhea, vomiting, heartburn and nausea    Sulfa (Sulfonamide Antibiotics) Rash         Review of Systems   Review of Systems Constitutional: Negative for activity change and appetite change. HENT: Negative. Respiratory: Negative. Cardiovascular: Negative. Genitourinary: Positive for vaginal pain. Negative for difficulty urinating, dysuria, flank pain, frequency, genital sores, hematuria, menstrual problem, pelvic pain and urgency. Musculoskeletal: Negative for back pain and neck pain. Neurological: Negative for dizziness, syncope and light-headedness. She did not hit her head or lose consciousness. All other systems reviewed and are negative. Physical Exam   Physical Exam   Vital signs and nursing notes reviewed    CONSTITUTIONAL: Alert, in mild distress; well-developed; well-nourished. HEAD:  Normocephalic, atraumatic  EYES: PERRL; EOM's intact. ENTM: Nose: no rhinorrhea; Throat: no erythema or exudate, mucous membranes moist  Neck:  Supple. trachea is midline. RESP: Chest clear, equal breath sounds. - W/R/R  CV: S1 and S2 WNL; No murmurs, gallops or rubs. 2+ radial and DP pulses bilaterally. GI: non-distended, normal bowel sounds, abdomen soft and non-tender. No masses or organomegaly. Pelvis is stable nontender. : Left labia with a moderate amount of swelling and overlying ecchymosis. Along the inferior posterior aspect of the labia there is a 1.5 cm simple laceration. There is no pulsatile bleeding from the laceration itself. The left labia is tender to the touch. No visible trauma or blood at the vaginal introitus. Clitoral cordova appears intact  BACK:  Non-tender, normal appearance  UPPER EXT:  Normal inspection. no joint or soft tissue swelling  LOWER EXT: No edema, no calf tenderness. NEURO: Alert and oriented x3, 5/5 strength and light touch sensation intact in bilateral upper and lower extremities. SKIN: No rashes;  Warm and dry  PSYCH: Normal mood, normal affect    Diagnostic Study Results     Labs -     Recent Results (from the past 12 hour(s))   CBC WITH AUTOMATED DIFF    Collection Time: 04/26/20 11:22 AM   Result Value Ref Range    WBC 15.1 (H) 3.6 - 11.0 K/uL    RBC 4.36 3.80 - 5.20 M/uL    HGB 13.3 11.5 - 16.0 g/dL    HCT 39.2 35.0 - 47.0 %    MCV 89.9 80.0 - 99.0 FL    MCH 30.5 26.0 - 34.0 PG    MCHC 33.9 30.0 - 36.5 g/dL    RDW 13.1 11.5 - 14.5 %    PLATELET 612 125 - 866 K/uL    MPV 11.6 8.9 - 12.9 FL    NRBC 0.0 0  WBC    ABSOLUTE NRBC 0.00 0.00 - 0.01 K/uL    NEUTROPHILS 83 (H) 32 - 75 %    LYMPHOCYTES 10 (L) 12 - 49 %    MONOCYTES 4 (L) 5 - 13 %    EOSINOPHILS 1 0 - 7 %    BASOPHILS 1 0 - 1 %    IMMATURE GRANULOCYTES 1 (H) 0.0 - 0.5 %    ABS. NEUTROPHILS 12.8 (H) 1.8 - 8.0 K/UL    ABS. LYMPHOCYTES 1.5 0.8 - 3.5 K/UL    ABS. MONOCYTES 0.6 0.0 - 1.0 K/UL    ABS. EOSINOPHILS 0.1 0.0 - 0.4 K/UL    ABS. BASOPHILS 0.1 0.0 - 0.1 K/UL    ABS. IMM. GRANS. 0.1 (H) 0.00 - 0.04 K/UL    DF AUTOMATED     HCG QL SERUM    Collection Time: 04/26/20 11:22 AM   Result Value Ref Range    HCG, Ql. Negative NEG         Radiologic Studies -   XR PELV AP ONLY   Final Result   IMPRESSION: No evidence of fracture or dislocation. Soft tissue swelling in the   left perineum. US PELV NON OBS   Final Result   IMPRESSION:   Large hematoma in the left labia majora. No evidence of pelvic free fluid. Unremarkable uterus. CT Results  (Last 48 hours)    None        CXR Results  (Last 48 hours)    None          Medical Decision Making   I am the first provider for this patient. I reviewed the vital signs, available nursing notes, past medical history, past surgical history, family history and social history. Vital Signs-Reviewed the patient's vital signs.   Patient Vitals for the past 12 hrs:   Temp Pulse Resp BP SpO2   04/26/20 1200    108/69 100 %   04/26/20 1136  82 18 121/77 100 %   04/26/20 1029 98.9 °F (37.2 °C) 84 16 137/77 98 %           Records Reviewed: Nursing Notes    Provider Notes (Medical Decision Making):   69-year-old female with left external vaginal trauma with small 1.5 cm cut. Will get ultrasound trans-labially and also transabdominally to ensure no free fluid or extension of hematoma into the pelvic cavity. Very low suspicion for intra-vaginal or intrauterine injury. Will need hCG as patient and her  are actively trying to get pregnant. Pain control, tetanus up-to-date, will need primary closure of lack. ED Course:   Initial assessment performed. The patients presenting problems have been discussed, and they are in agreement with the care plan formulated and outlined with them. I have encouraged them to ask questions as they arise throughout their visit. 1:00 PM  Spoke to Dr. Aemena Blevins, [facility] hospitalist on call to discuss large hematoma. She says in general, removing hematoma from the labia majora is generally not done but does recommend she follow-up within the week to see how her pain is. Procedure Note - Laceration Repair:  1:01 PM  Procedure by Nate Padgett MD  .  Complexity: simple  1.5 cm linear laceration to posterior labia majora/buttock  was irrigated copiously with NS under jet lavage, prepped with Betadine and draped in a sterile fashion. The area was anesthetized with 2 mLs of  Bupivacaine 0.25% and 1% lidocaine with epi without via local infiltration. The wound was explored with the following results: No foreign bodies found. The wound was repaired with One layer suture closure: Skin Layer:  2 sutures placed, stitch type:simple interrupted, suture: 4-0 nylon. .  The wound was closed with good hemostasis and approximation. Sterile dressing applied. Estimated blood loss: < 1cc  The procedure took 1-15 minutes, and pt tolerated well. Disposition:  Discharge    Discharge Note:  1:30pm  The pt is ready for discharge. The pt's signs, symptoms, diagnosis, and discharge instructions have been discussed and pt has conveyed their understanding. The pt is to follow up as recommended or return to ER should their symptoms worsen.  Plan has been discussed and pt is in agreement. DISCHARGE PLAN:  1. Current Discharge Medication List      START taking these medications    Details   oxyCODONE IR (ROXICODONE) 10 mg tab immediate release tablet Take 1 Tab by mouth every eight (8) hours as needed for Pain for up to 3 days. Max Daily Amount: 30 mg.  Qty: 9 Tab, Refills: 0    Associated Diagnoses: Hematoma of labia majora; Pelvic straddle injury, initial encounter           2. Follow-up Information    None       3. Return to ED if worse     Diagnosis     Clinical Impression:   1. Hematoma of labia majora    2. Pelvic straddle injury, initial encounter        Attestations:    Juan Gonzalez MD    Please note that this dictation was completed with [employer], the computer voice recognition software. Quite often unanticipated grammatical, syntax, homophones, and other interpretive errors are inadvertently transcribed by the computer software. Please disregard these errors. Please excuse any errors that have escaped final proofreading. Thank you.

## 2020-05-04 ENCOUNTER — TELEPHONE (OUTPATIENT)
Dept: OBGYN CLINIC | Age: 30
End: 2020-05-04

## 2020-05-04 ENCOUNTER — OFFICE VISIT (OUTPATIENT)
Dept: OBGYN CLINIC | Age: 30
End: 2020-05-04

## 2020-05-04 VITALS
WEIGHT: 184 LBS | DIASTOLIC BLOOD PRESSURE: 71 MMHG | HEIGHT: 65 IN | SYSTOLIC BLOOD PRESSURE: 126 MMHG | BODY MASS INDEX: 30.66 KG/M2 | HEART RATE: 93 BPM

## 2020-05-04 DIAGNOSIS — S30.23XA: Primary | ICD-10-CM

## 2020-05-04 NOTE — TELEPHONE ENCOUNTER
Call received bret 8:35am  TP     31year old patient last seen in the office on 8/21/2019    Sukhdev Rosales NP from [facility] calling to say that the patient fall and went to the ER for follow up and and sutures in her labia. Patient had sutures removed this am but the left labia is very swollen and painful and needs I&D per NP    Patient placed on the schedule to be seen at 1:10PM today  ( TT aware)    NP advised to remind patient of instructions due to covid 19 and to wear a mask.

## 2020-05-04 NOTE — PROGRESS NOTES
Labial contusion evaluation    Aleksander Kirkpatrick is a 31 y.o. female  No LMP recorded. who presents with a large hematoma from trauma with punture on her left labia caused by a fall on 4/26. She went to ER on 4/26/20 and received the following treatment:  Suturing of punture wound. Pt stated they did not drain hematoma. She followed up with [facility] on 5/4/20 and stitches were removed because they were to tight and not allowing any drainage. She was told by Paresh Javed NP; that the hematoma needed to be drained by her GYN. She reports the following associated symptoms: pressure, pain. She denies the following associated symptoms: fever. Hematoma was the size of a soft ball and now 1/2 the size. She can urinate. Past Medical History:   Diagnosis Date    ADD (attention deficit disorder)     Breast lump in female     Endorses provider felt left breast lump in 2017, pt denies ever feeling breast lump at anytime    Depression     Diabetes ([facility] [address]     pre-diabetes    Hypertension     Pap smear for cervical cancer screening 07/19/2019    Negative, HPV negative    Ulcerative colitis ([facility] [address]      Past Surgical History:   Procedure Laterality Date    COLONOSCOPY,DIAGNOSTIC  1/6/2016         HX ADENOIDECTOMY      age 2 or 2    HX TYMPANOSTOMY      had tubes placed twice during childhood    HX WISDOM TEETH EXTRACTION  2006     Social History     Occupational History    Not on file   Tobacco Use    Smoking status: Never Smoker    Smokeless tobacco: Never Used   Substance and Sexual Activity    Alcohol use:  Yes     Alcohol/week: 0.8 standard drinks     Types: 1 Shots of liquor per week     Comment: occasional    Drug use: No    Sexual activity: Yes     Partners: Male     Family History   Problem Relation Age of Onset    Hypertension Father     Diabetes Father     Psychiatric Disorder Father     Psychiatric Disorder Mother     Diabetes Mother     Hypertension Mother     High Cholesterol Mother  Cancer Maternal Grandmother         lung    Psychiatric Disorder Maternal Grandmother     Heart Disease Maternal Grandmother     Diabetes Paternal Grandfather     Heart Disease Paternal Grandfather    Rosezella Arron Syndrome Maternal Uncle     Downs Syndrome Cousin        Allergies   Allergen Reactions    Augmentin [Amoxicillin-Pot Clavulanate] Other (comments)     Diarrhea, vomiting, heartburn and nausea    Sulfa (Sulfonamide Antibiotics) Rash     Prior to Admission medications    Medication Sig Start Date End Date Taking? Authorizing Provider   buPROPion [facility]) 75 mg tablet Take 75 mg by mouth daily. Other, MD Pamela        Review of Systems: History obtained from the patient  Constitutional: negative for weight loss, fever, night sweats  GI: negative for change in bowel habits, abdominal pain, black or bloody stools  : negative for frequency, dysuria, hematuria, vaginal discharge  MSK: negative for back pain, joint pain, muscle pain  Skin: negative for itching, rash, hives elsewhere  Neuro: negative for dizziness, headache, confusion, weakness  Psych: negative for anxiety, depression, change in mood  Heme/lymph: negative for bleeding, bruising, pallor    Objective:  Visit Vitals  /71   Pulse 93   Ht 5' 5\" (1.651 m)   Wt 184 lb (83.5 kg)   BMI 30.62 kg/m²       Physical Exam:   PHYSICAL EXAMINATION    Constitutional  · Appearance: well-nourished, well developed, alert, in no acute distress    Gastrointestinal  · Abdominal Examination: abdomen non-tender to palpation, normal bowel sounds, no masses present  · Liver and spleen: no hepatomegaly present, spleen not palpable  · Hernias: no hernias identified    Genitourinary  · External Genitalia:Hematoma involving the left labia and vulva, mild ecchymoses on mons pubis and left lower buttocks, Mildly tender and firm, 1-2 cm opening in hematoma with small dark bloody drainage.   · Vagina: n  · Bladder:   · Urethra:   · Cervix:  · Uterus:  · Adnexa: · Perineum: as above  · Anus: anus within normal limits, no hemorrhoids present  · Inguinal Lymph Nodes: no lymphadenopathy present    Skin  · General Inspection: no rash, no lesions identified    Neurologic/Psychiatric  · Mental Status:  · Orientation: grossly oriented to person, place and time  · Mood and Affect: mood normal, affect appropriate    Assessment:   left labial/vulvar hematoma, decreased in size, no evidence of infection-appears to be spontaneously resolving. Plan:  Observe. F/u with Dr Shauna Jara in 2 weeks. RTO sooner if fever, increased pain         RTO prn if symptoms persist or worsen. Instructions given to pt.

## 2020-05-19 ENCOUNTER — OFFICE VISIT (OUTPATIENT)
Dept: OBGYN CLINIC | Age: 30
End: 2020-05-19

## 2020-05-19 VITALS
DIASTOLIC BLOOD PRESSURE: 84 MMHG | BODY MASS INDEX: 29.79 KG/M2 | SYSTOLIC BLOOD PRESSURE: 134 MMHG | WEIGHT: 178.8 LBS | HEIGHT: 65 IN

## 2020-05-19 DIAGNOSIS — N90.89 VULVAR HEMATOMA: Primary | ICD-10-CM

## 2020-05-19 RX ORDER — AMLODIPINE BESYLATE 5 MG/1
5 TABLET ORAL ONCE
COMMUNITY
End: 2020-12-17

## 2020-05-19 NOTE — PATIENT INSTRUCTIONS
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
## 2020-10-05 ENCOUNTER — TELEPHONE (OUTPATIENT)
Dept: OBGYN CLINIC | Age: 30
End: 2020-10-05

## 2020-10-05 NOTE — TELEPHONE ENCOUNTER
Madonna, please evaluate schedule for a 10 minute slot this week. Oni Bunn Ra I saw nothing but red blocks. ...

## 2020-10-05 NOTE — TELEPHONE ENCOUNTER
Checking to see since she just had a positive pregnancy test and has EOB scheduled on 11/19/20 if she should continue to the Wellbutrin (generic) 150 mg and the Norvasc (generic)5 mg every day      Patient is aware TP is out of office today.

## 2020-10-05 NOTE — TELEPHONE ENCOUNTER
Looks like this is a change in medications /dose from her previous visit recommend pregnancy medication consult this week I think I have several 10 minute options available.      Thanks   TRP

## 2020-10-06 NOTE — TELEPHONE ENCOUNTER
10/05/20 2:50 per Americo Phoenix (ok per TP)- patient accepted appt and is good with this. May be a wait to work her in, she is good with this.

## 2020-10-14 NOTE — PATIENT INSTRUCTIONS
Pregnancy Precautions: Care Instructions Your Care Instructions There is no sure way to prevent labor before your due date ( labor) or to prevent most other pregnancy problems. But there are things you can do to increase your chances of a healthy pregnancy. Go to your appointments, follow your doctor's advice, and take good care of yourself. Eat well, and exercise (if your doctor agrees). And make sure to drink plenty of water. Follow-up care is a key part of your treatment and safety. Be sure to make and go to all appointments, and call your doctor if you are having problems. It's also a good idea to know your test results and keep a list of the medicines you take. How can you care for yourself at home? · Make sure you go to your prenatal appointments. At each visit, your doctor will check your blood pressure. Your doctor will also check to see if you have protein in your urine. High blood pressure and protein in urine are signs of preeclampsia. This condition can be dangerous for you and your baby. · Drink plenty of fluids, enough so that your urine is light yellow or clear like water. Dehydration can cause contractions. If you have kidney, heart, or liver disease and have to limit fluids, talk with your doctor before you increase the amount of fluids you drink. · Tell your doctor right away if you notice any symptoms of an infection, such as: 
? Burning when you urinate. ? A foul-smelling discharge from your vagina. ? Vaginal itching. ? Unexplained fever. ? Unusual pain or soreness in your uterus or lower belly. · Eat a balanced diet. Include plenty of foods that are high in calcium and iron. ? Foods high in calcium include milk, cheese, yogurt, almonds, and broccoli. ? Foods high in iron include red meat, shellfish, poultry, eggs, beans, raisins, whole-grain bread, and leafy green vegetables. · Do not smoke.  If you need help quitting, talk to your doctor about stop-smoking programs and medicines. These can increase your chances of quitting for good. · Do not drink alcohol or use illegal drugs. · Follow your doctor's directions about activity. Your doctor will let you know how much, if any, exercise you can do. · Ask your doctor if you can have sex. If you are at risk for early labor, your doctor may ask you to not have sex. · Take care to prevent falls. During pregnancy, your joints are loose, and your balance is off. Sports such as bicycling, skiing, or in-line skating can increase your risk of falling. And don't ride horses or motorcycles, dive, water ski, scuba dive, or parachute jump while you are pregnant. · Avoid getting very hot. Do not use saunas or hot tubs. Avoid staying out in the sun in hot weather for long periods. Take acetaminophen (Tylenol) to lower a high fever. · Do not take any over-the-counter or herbal medicines or supplements without talking to your doctor or pharmacist first. 
When should you call for help? Call 911 anytime you think you may need emergency care. For example, call if: 
  · You passed out (lost consciousness).  
  · You have a seizure.  
  · You have severe vaginal bleeding.  
  · You have severe pain in your belly or pelvis.  
  · You have had fluid gushing or leaking from your vagina and you know or think the umbilical cord is bulging into your vagina. If this happens, immediately get down on your knees so your rear end (buttocks) is higher than your head. This will decrease the pressure on the cord until help arrives. Call your doctor now or seek immediate medical care if: 
  · You have signs of preeclampsia, such as: 
? Sudden swelling of your face, hands, or feet. ? New vision problems (such as dimness, blurring, or seeing spots). ? A severe headache.  
  · You have any vaginal bleeding.  
  · You have belly pain or cramping.  
  · You have a fever.   · You have had regular contractions (with or without pain) for an hour. This means that you have 8 or more within 1 hour or 4 or more in 20 minutes after you change your position and drink fluids.  
  · You have a sudden release of fluid from your vagina.  
  · You have low back pain or pelvic pressure that does not go away.  
  · You notice that your baby has stopped moving or is moving much less than normal.  
Watch closely for changes in your health, and be sure to contact your doctor if you have any problems. Where can you learn more? Go to http://www.[employer]/ Enter [id number] in the search box to learn more about \"Pregnancy Precautions: Care Instructions. \" Current as of: February 11, 2020               Content Version: 12.6 © 9184-7070 [employer]. Care instructions adapted under license by [employer] (which disclaims liability or warranty for this information). If you have questions about a medical condition or this instruction, always ask your healthcare professional. [place] [address] any warranty or liability for your use of this information.

## 2020-10-15 ENCOUNTER — OFFICE VISIT (OUTPATIENT)
Dept: OBGYN CLINIC | Age: 30
End: 2020-10-15
Payer: COMMERCIAL

## 2020-10-15 VITALS
BODY MASS INDEX: 29.66 KG/M2 | DIASTOLIC BLOOD PRESSURE: 60 MMHG | WEIGHT: 178 LBS | HEIGHT: 65 IN | SYSTOLIC BLOOD PRESSURE: 136 MMHG

## 2020-10-15 DIAGNOSIS — I10 CHRONIC HYPERTENSION: ICD-10-CM

## 2020-10-15 DIAGNOSIS — Z23 ENCOUNTER FOR IMMUNIZATION: ICD-10-CM

## 2020-10-15 DIAGNOSIS — N92.6 MISSED MENSES: Primary | ICD-10-CM

## 2020-10-15 DIAGNOSIS — F98.8 ATTENTION DEFICIT DISORDER, UNSPECIFIED HYPERACTIVITY PRESENCE: ICD-10-CM

## 2020-10-15 DIAGNOSIS — R03.0 ELEVATED BLOOD PRESSURE READING: ICD-10-CM

## 2020-10-15 DIAGNOSIS — Z32.00 VISIT FOR CONFIRMATION OF PREGNANCY TEST RESULT WITH PHYSICAL EXAM: ICD-10-CM

## 2020-10-15 LAB
HCG URINE, QL. (POC): POSITIVE
VALID INTERNAL CONTROL?: YES

## 2020-10-15 PROCEDURE — 81025 URINE PREGNANCY TEST: CPT | Performed by: OBSTETRICS & GYNECOLOGY

## 2020-10-15 PROCEDURE — 90471 IMMUNIZATION ADMIN: CPT

## 2020-10-15 PROCEDURE — 99213 OFFICE O/P EST LOW 20 MIN: CPT | Performed by: OBSTETRICS & GYNECOLOGY

## 2020-10-15 PROCEDURE — 90686 IIV4 VACC NO PRSV 0.5 ML IM: CPT

## 2020-10-15 RX ORDER — BUPROPION HYDROCHLORIDE 150 MG/1
TABLET ORAL
COMMUNITY
Start: 2020-09-14 | End: 2020-11-06 | Stop reason: SDUPTHER

## 2020-10-15 RX ORDER — METHYLDOPA 500 MG/1
500 TABLET, FILM COATED ORAL 2 TIMES DAILY
Qty: 60 TAB | Refills: 1 | Status: SHIPPED | OUTPATIENT
Start: 2020-10-15 | End: 2020-11-06 | Stop reason: SDUPTHER

## 2020-10-15 NOTE — PROGRESS NOTES
[address] [facility]  [email or web address]  [phone]    Maninder Cardenas MD, FACOG       OB/GYN Problem visit    Chief Complaint:   Chief Complaint   Patient presents with    Medication Problem    Missed Menses       Last or next WWE is: Due now    History of Present Illness: This is a new problem being evaluated by this provider. The patient is a 31 y.o. [de-identified]  female who reports having testing positive for pregnancy, 2 weeks ago. She reports the symptoms are is unchanged. Aggravating factors include none. Alleviating factors include none. She does have other concerns. Patient has concerns about her current medication she is taking for high blood pressure. LMP: Patient's last menstrual period was 09/01/2020 (exact date). 3d late, lasted 2 days. 10/5 pos upt.      PFSH:  Past Medical History:   Diagnosis Date    ADD (attention deficit disorder)     Breast lump in female     Endorses provider felt left breast lump in 2017, pt denies ever feeling breast lump at anytime    Depression     Diabetes ([address]     pre-diabetes    Hypertension     Pap smear for cervical cancer screening 07/19/2019    Negative, HPV negative    Ulcerative colitis ([facility] [address]      Past Surgical History:   Procedure Laterality Date    COLONOSCOPY,DIAGNOSTIC  1/6/2016         HX ADENOIDECTOMY      age 2 or 1    HX TYMPANOSTOMY      had tubes placed twice during childhood    HX WISDOM TEETH EXTRACTION  2006     Family History   Problem Relation Age of Onset    Hypertension Father     Diabetes Father     Psychiatric Disorder Father     Psychiatric Disorder Mother     Diabetes Mother     Hypertension Mother     High Cholesterol Mother     Cancer Maternal Grandmother         lung    Psychiatric Disorder Maternal Grandmother     Heart Disease Maternal Grandmother     Diabetes Paternal Grandfather     Heart Disease Paternal Grandfather    Julious Stephan Syndrome Maternal Uncle     Downs Syndrome Cousin      Social History     Tobacco Use    Smoking status: Never Smoker    Smokeless tobacco: Never Used   Substance Use Topics    Alcohol use: Yes     Alcohol/week: 0.8 standard drinks     Types: 1 Shots of liquor per week     Comment: occasional    Drug use: No     Allergies   Allergen Reactions    Augmentin [Amoxicillin-Pot Clavulanate] Other (comments)     Diarrhea, vomiting, heartburn and nausea    Sulfa (Sulfonamide Antibiotics) Rash     Current Outpatient Medications   Medication Sig    buPROPion XL (WELLBUTRIN XL) 150 mg tablet     methyldopa (ALDOMET) 500 mg tablet Take 1 Tab by mouth two (2) times a day.  amLODIPine (NORVASC) 5 mg tablet Take 5 mg by mouth once. daily    prenatal vit-iron fumarate-fa 27 mg iron- 0.8 mg tab tablet Take 1 Tab by mouth daily.  buPROPion (WELLBUTRIN) 75 mg tablet Take 75 mg by mouth daily. 150 mg     No current facility-administered medications for this visit.         Review of Systems:  History obtained from the patient  Constitutional: negative for fevers, chills and weight loss  ENT ROS: negative for - hearing change, oral lesions or visual changes  Respiratory: negative for cough, wheezing or dyspnea on exertion  Cardiovascular: negative for chest pain, irregular heart beats, exertional chest pressure/discomfort  Gastrointestinal: negative for dysphagia, nausea and vomiting  Genito-Urinary ROS:  see HPI  Inteument/breast: negative for rash, breast lump and nipple discharge  Musculoskeletal:negative for stiff joints, neck pain and muscle weakness  Endocrine ROS: negative for - breast changes, galactorrhea or temperature intolerance  Hematological and Lymphatic ROS: negative for - blood clots, bruising or swollen lymph nodes    Physical Exam:  Visit Vitals  /60 (BP 1 Location: Right arm, BP Patient Position: Sitting)   Ht 5' 5\" (1.651 m)   Wt 178 lb (80.7 kg)   BMI 29.62 kg/m²       GENERAL: alert, well appearing, and in no distress  HEAD: normocephalic, atraumatic. PULM: clear to auscultation, no wheezes, rales or rhonchi, symmetric air entry   COR: normal rate and regular rhythm, S1 and S2 normal   ABDOMEN: soft, nontender, nondistended, no masses or organomegaly   NEURO: alert, oriented, normal speech    Assessment:  Encounter Diagnoses   Name Primary?  Missed menses Yes    Encounter for immunization     Chronic hypertension     Attention deficit disorder, unspecified hyperactivity presence     Elevated blood pressure reading     Visit for confirmation of pregnancy test result with physical exam        Plan:  The patient is advised that she should contact the office if she does not note improvement or if symptoms recur  Recommend follow up with PCP for non-gynecologic complaints and chronic medical problems. She should contact our office with any questions or concerns  She could keep her routine annual exam appointment. Disc rba of ssri/wellbutrin and rec avoiding if tolerated, pt needs to take med to drive and function  Disc risks on pregnancy  Disc safer meds to take for htn. Will change to aldomet  EOB/US  Plan FS MFM and disc serial us during IUP  Disc risk of med exposure in pregnancy including birth defects, cardiac defects, sab    I spent 25 minutes of face to face time counseling and discussing medications, add, htn, chtn in pregnancy working during pregnancy, flu vaccine with the patient. More than 50 % of her visit was spent performing counseling.            Orders Placed This Encounter    IA IMMUNIZ ADMIN,1 SINGLE/COMB VAC/TOXOID    Influenza virus vaccine (QUADRIVALENT PF SYRINGE) (13161)    AMB POC URINE PREGNANCY TEST, VISUAL COLOR COMPARISON    methyldopa (ALDOMET) 500 mg tablet       Results for orders placed or performed in visit on 10/15/20   AMB POC URINE PREGNANCY TEST, VISUAL COLOR COMPARISON   Result Value Ref Range    VALID INTERNAL CONTROL POC Yes     HCG urine, Ql. (POC) Positive Negative

## 2020-10-15 NOTE — LETTER
10/15/2020 3:43 PM 
 
Ms. Robert Dimas [address] [facility] [id number] To Whom It May Concern, 
 
Mrs. Minh Quigley,  is under my care for her current pregnancy. By our best estimation, her EDC is 06/08/2021. Based on the American Congress of Obstetric[employer] and Gyne[employer] guidelines, Please allow the patient to wear comfortable shoes, access to drinking water, no heavy lifting over 25 pounds, please allow a 30 minute break every 3 hours throughout the day and work no more than 40 hours in 1 work week. Please have the patient contact my office should you have any questions regarding this letter. Sincerely, Ximena Hernández MD

## 2020-10-15 NOTE — PROGRESS NOTES
After obtaining consent, and per orders of Dr Momo Alves, injection of Flulaval quadrivalent given in right deltoid by Betzaida Seay LPN. Patient instructed to remain in clinic for 20 minutes afterwards, and to report any adverse reaction to me immediately. VIS given.

## 2020-11-06 ENCOUNTER — OFFICE VISIT (OUTPATIENT)
Dept: OBGYN CLINIC | Age: 30
End: 2020-11-06
Payer: COMMERCIAL

## 2020-11-06 VITALS
SYSTOLIC BLOOD PRESSURE: 115 MMHG | DIASTOLIC BLOOD PRESSURE: 77 MMHG | HEART RATE: 72 BPM | WEIGHT: 180 LBS | BODY MASS INDEX: 29.99 KG/M2 | HEIGHT: 65 IN

## 2020-11-06 DIAGNOSIS — Z34.00 PRENATAL CARE OF PRIMIGRAVIDA, ANTEPARTUM: Primary | ICD-10-CM

## 2020-11-06 DIAGNOSIS — I10 ESSENTIAL HYPERTENSION: ICD-10-CM

## 2020-11-06 DIAGNOSIS — F98.8 ATTENTION DEFICIT DISORDER, UNSPECIFIED HYPERACTIVITY PRESENCE: ICD-10-CM

## 2020-11-06 LAB
ANTIBODY SCREEN, EXTERNAL: NORMAL
CHLAMYDIA, EXTERNAL: NORMAL
CYSTIC FIBROSIS, EXTERNAL: NORMAL
HBSAG, EXTERNAL: NORMAL
HCT, EXTERNAL: 33.9
HGB, EXTERNAL: 11.2
HIV, EXTERNAL: NORMAL
N. GONORRHEA, EXTERNAL: NORMAL
PLATELET CNT,   EXTERNAL: 230
RUBELLA, EXTERNAL: NORMAL
T. PALLIDUM, EXTERNAL: NON REACTIVE
TYPE, ABO & RH, EXTERNAL: NORMAL
URINALYSIS, EXTERNAL: NORMAL

## 2020-11-06 PROCEDURE — 0500F INITIAL PRENATAL CARE VISIT: CPT | Performed by: OBSTETRICS & GYNECOLOGY

## 2020-11-06 PROCEDURE — 99213 OFFICE O/P EST LOW 20 MIN: CPT | Performed by: OBSTETRICS & GYNECOLOGY

## 2020-11-06 PROCEDURE — 76801 OB US < 14 WKS SINGLE FETUS: CPT | Performed by: OBSTETRICS & GYNECOLOGY

## 2020-11-06 RX ORDER — METHYLDOPA 500 MG/1
500 TABLET, FILM COATED ORAL 2 TIMES DAILY
Qty: 60 TAB | Refills: 1 | Status: SHIPPED | OUTPATIENT
Start: 2020-11-06 | End: 2020-12-17

## 2020-11-06 RX ORDER — BUPROPION HYDROCHLORIDE 150 MG/1
150 TABLET ORAL
Qty: 30 TAB | Refills: 1 | Status: SHIPPED | OUTPATIENT
Start: 2020-11-06

## 2020-11-06 NOTE — PATIENT INSTRUCTIONS
Learning About Pregnancy Your Care Instructions Your health in the early weeks of your pregnancy is particularly important for your baby's health. Take good care of yourself. Anything you do that harms your body can also harm your baby. Make sure to go to all of your doctor appointments. Regular checkups will help keep you and your baby healthy. How can you care for yourself at home? Diet 
  · Eat a balanced diet. Make sure your diet includes plenty of beans, peas, and leafy green vegetables.  
  · Do not skip meals or go for many hours without eating. If you are nauseated, try to eat a small, healthy snack every 2 to 3 hours.  
  · Do not eat fish that has a high level of mercury, such as shark, swordfish, or mackerel. Do not eat more than one can of tuna each week.  
  · Drink plenty of fluids, enough so that your urine is light yellow or clear like water. If you have kidney, heart, or liver disease and have to limit fluids, talk with your doctor before you increase the amount of fluids you drink.  
  · Cut down on caffeine, such as coffee, tea, and cola.  
  · Do not drink alcohol, such as beer, wine, or hard liquor.  
  · Take a multivitamin that contains at least 400 micrograms (mcg) of folic acid to help prevent birth defects. Fortified cereal and whole wheat bread are good additional sources of folic acid.  
  · Increase the calcium in your diet. Try to drink a quart of skim milk each day. You may also take calcium supplements and choose foods such as cheese and yogurt. Lifestyle 
  · Make sure you go to your follow-up appointments.  
  · Get plenty of rest. You may be unusually tired while you are pregnant.  
  · Get at least 30 minutes of exercise on most days of the week. Walking is a good choice. If you have not exercised in the past, start out slowly. Take several short walks each day.  
  · Do not smoke.  If you need help quitting, talk to your doctor about stop-smoking programs. These can increase your chances of quitting for good.  
  · Do not touch cat feces or litter boxes. Also, wash your hands after you handle raw meat, and fully cook all meat before you eat it. Wear gloves when you work in the yard or garden, and wash your hands well when you are done. Cat feces, raw or undercooked meat, and contaminated dirt can cause an infection that may harm your baby or lead to a miscarriage.  
  · Do not use saunas or hot tubs. Raising your body temperature may harm your baby.  
  · Avoid chemical fumes, paint fumes, or poisons.  
  · Do not use illegal drugs or alcohol. Medicines 
  · Review all of your medicines with your doctor. Some of your routine medicines may need to be changed to protect your baby.  
  · Use acetaminophen (Tylenol) to relieve minor problems, such as a mild headache or backache or a mild fever with cold symptoms. Do not use nonsteroidal anti-inflammatory drugs (NSAIDs), such as ibuprofen (Advil, Motrin) or naproxen (Aleve), unless your doctor says it is okay.  
  · Do not take two or more pain medicines at the same time unless the doctor told you to. Many pain medicines have acetaminophen, which is Tylenol. Too much acetaminophen (Tylenol) can be harmful.  
  · Take your medicines exactly as prescribed. Call your doctor if you think you are having a problem with your medicine. To manage morning sickness 
  · If you feel sick when you first wake up, try eating a small snack (such as crackers) before you get out of bed. Allow some time to digest the snack, and then get out of bed slowly.  
  · Do not skip meals or go for long periods without eating. An empty stomach can make nausea worse.  
  · Eat small, frequent meals instead of three large meals each day.  
  · Drink plenty of fluids. Sports drinks, such as Gatorade or Powerade, are good choices.  
  · Eat foods that are high in protein but low in fat.   · If you are taking iron supplements, ask your doctor if they are necessary. Iron can make nausea worse.  
  · Avoid any smells, such as coffee, that make you feel sick.  
  · Get lots of rest. Morning sickness may be worse when you are tired. Follow-up care is a key part of your treatment and safety. Be sure to make and go to all appointments, and call your doctor if you are having problems. It's also a good idea to know your test results and keep a list of the medicines you take. Where can you learn more? Go to http://www.[employer]/ Enter X178 in the search box to learn more about \"Learning About Pregnancy. \" Current as of: February 11, 2020               Content Version: 12.6 © 2041-8933 [employer]. Care instructions adapted under license by [employer] (which disclaims liability or warranty for this information). If you have questions about a medical condition or this instruction, always ask your healthcare professional. [address] any warranty or liability for your use of this information.

## 2020-11-06 NOTE — PROGRESS NOTES
[facility]  [email or web address]  [phone]    Carolynn Jerez MD, [address] [facility]     Chief complaint:  Irregular cycles  Last cycle; Patient's last menstrual period was 2020. This is a new concern and an evaluation is planned. Current pregnancy history:  Cheyenne Arteaga is a [de-identified] P5, 31 y.o. female [facility] [place]   She presents for the evaluation of irregular menses and a positive pregnancy test.    LMP history:  Patient's last menstrual period was 2020. Delfin Buys The date of the beginning of her last menstrual period is certain. Her menses are regular. Her cycles occur about every 4 weeks. A urine pregnancy test was positive about 3 weeks ago. She was not using contraception at the estimated time of conception. Based on her LMP, her EGA is 9 weeks,3 days with and EDC of 2021. Ultrasound data:  She had an ultrasound today which revealed a viable galdamez pregnancy with a gestational age of 10 weeks and 1 days giving an EDC of 06/10/2021. Pregnancy symptoms:  She reports fatigue, breast tenderness, pelvic pain, nausea. She denies vaginal bleeding. Since she found out she is pregnant, she has gained, 13 lbs. She reports her prepregnancy weight as 167 pounds. Relevant past pregnancy history:  She has the following pregnancy history:none. She does not have a history of  delivery. She does not have a history of a prior  section. Relevant past medical history:(relevant to this pregnancy):   none     Pap smear history:  Last pap smear: 2019  Results: within normal limits    Occupational history  Her occupation is: [employer]. Substance history:   She does not report current tobacco use. She does not report current alcohol use. She does not report current drug use. Exposure history: There are indoor cat(s) in the home. The patient was instructed not to change cat litter boxes during pregnancy.   She does not report close contact with children on a regular basis. She has chicken pox or the vaccine in the past.   Patient does not report issues with domestic violence. Genetic Screening/Teratology Counseling:   (Includes patient, baby's father, or anyone in either family with:)  3.  Patient's age >/= 28 at EDC?--30 y.o.        FOB age: 44years old. 2.  [place]alassemia ([facility], [facility], [address], or  background): MCV<80?--no  3. Neural tube defect (meningomyelocele, spina bifida, anencephaly)? --no  4. Congenital heart defect?--FOB, heart mumur, did not close (SBE prophylaxis), MOB: irregular heart beat: father' also has it. Sister's child : ? Heart surgery  5. Down syndrome?--: maternal GM had chlid with ? DS, that passed. 6.  Josué-Sachs ([address])? --no  7. Canavan's Disease?--no  8. Familial Dysautonomia?--no   9. Sickle cell disease or trait ()? --no   Has she been tested for sickle trait: Unknown  10. Hemophilia or other blood disorders?--no  11. Muscular dystrophy?--no  12. Cystic fibrosis? --no  13. Baxter's Chorea?--no  14. Mental retardation/autism (if yes was person tested for Fragile X)? -autism: FOB nephew, cousin's childrem  13. Other inherited genetic or chromosomal disorder?- no  16. Maternal metabolic disorder (DM, PKU, etc)? --no  17. Patient or FOB with a child with a birth defect not listed above?--no  17a. Patient or FOB with a birth defect themselves?--no  25. Recurrent pregnancy loss, or stillbirth?--no  19. Any medications since LMP other than prenatal vitamins (include vitamins, supplements, OTC meds, drugs, alcohol)? --   Current Outpatient Medications   Medication Instructions    amLODIPine (NORVASC) 5 mg, Oral, ONCE, daily    buPROPion (WELLBUTRIN) 75 mg, Oral, DAILY, 150 mg    buPROPion XL (WELLBUTRIN XL) 150 mg, Oral, 7AM    methyldopa (ALDOMET) 500 mg, Oral, 2 TIMES DAILY    prenatal vit-iron fumarate-fa 27 mg iron- 0.8 mg tab tablet 1 Tab, Oral, DAILY   stopped norvasc    20. Any other genetic/environmental exposure to discuss?--no. Infection History:  1. Lives with someone with TB or TB exposed?--no  2. Patient or partner has history of genital herpes?--no  3. Rash or viral illness since LMP?--no  4. History of STD (GC, CT, HPV, syphilis, HIV)? --no  5. Have you received a flu vaccine for the most recent flu season? -- yes  6.   Have you or your sexual partner(s) travelled to a [address] area in the last 3 months? -- no    Past Medical History:   Diagnosis Date    ADD (attention deficit disorder)     Breast lump in female     Endorses provider felt left breast lump in 2017, pt denies ever feeling breast lump at anytime    Depression     Diabetes ([facility] [address]     pre-diabetes    Hypertension     Pap smear for cervical cancer screening 07/19/2019    Negative, HPV negative    Ulcerative colitis ([facility] [address]      Past Surgical History:   Procedure Laterality Date    COLONOSCOPY,DIAGNOSTIC  1/6/2016         HX ADENOIDECTOMY      age 2 or 1    HX TYMPANOSTOMY      had tubes placed twice during childhood    HX WISDOM TEETH EXTRACTION  2006     Social History     Occupational History    Not on file   Tobacco Use    Smoking status: Never Smoker    Smokeless tobacco: Never Used   Substance and Sexual Activity    Alcohol use: Not Currently     Alcohol/week: 0.8 standard drinks     Types: 1 Shots of liquor per week     Comment: occasional    Drug use: No    Sexual activity: Yes     Partners: Male     Family History   Problem Relation Age of Onset    Hypertension Father     Diabetes Father     Psychiatric Disorder Father     Psychiatric Disorder Mother     Diabetes Mother     Hypertension Mother     High Cholesterol Mother     Cancer Maternal Grandmother         lung    Psychiatric Disorder Maternal Grandmother     Heart Disease Maternal Grandmother     Diabetes Paternal Grandfather     Heart Disease Paternal Grandfather  Downs Syndrome Maternal Uncle     Downs Syndrome Cousin      OB History    Para Term  AB Living   1 0 0 0 0 0   SAB TAB Ectopic Molar Multiple Live Births   0 0 0 0 0 0      # Outcome Date GA Lbr Dillon/2nd Weight Sex Delivery Anes PTL Lv   1 Current              Allergies   Allergen Reactions    Augmentin [Amoxicillin-Pot Clavulanate] Other (comments)     Diarrhea, vomiting, heartburn and nausea    Sulfa (Sulfonamide Antibiotics) Rash     Prior to Admission medications    Medication Sig Start Date End Date Taking? Authorizing Provider   buPROPion XL (WELLBUTRIN XL) 150 mg tablet Take 1 Tab by mouth every morning. 20  Yes Maycol Moreno MD   methyldopa (ALDOMET) 500 mg tablet Take 1 Tab by mouth two (2) times a day. 20  Yes Maycol Moreno MD   prenatal vit-iron fumarate-fa 27 mg iron- 0.8 mg tab tablet Take 1 Tab by mouth daily. Yes Provider, Historical   amLODIPine (NORVASC) 5 mg tablet Take 5 mg by mouth once. daily    Provider, Historical   buPROPion (WELLBUTRIN) 75 mg tablet Take 75 mg by mouth daily.  150 mg    Other, MD Pamela        Review of Systems - History obtained from the patient  Constitutional: negative for weight loss, fever, night sweats  HEENT: negative for hearing loss, earache, congestion, snoring, sorethroat  CV: negative for chest pain, palpitations, edema  Resp: negative for cough, shortness of breath, wheezing  GI: negative for change in bowel habits, abdominal pain, black or bloody stools  : negative for frequency, dysuria, hematuria, vaginal discharge  MSK: negative for back pain, joint pain, muscle pain  Breast: negative for breast lumps, nipple discharge, galactorrhea  Skin :negative for itching, rash, hives  Neuro: negative for dizziness, headache, confusion, weakness  Psych: negative for anxiety, depression, change in mood  Heme/lymph: negative for bleeding, bruising, pallor    Objective:  Visit Vitals  /77 (BP 1 Location: Right arm, BP Patient Position: Sitting)   Pulse 72   Ht 5' 5\" (1.651 m)   Wt 180 lb (81.6 kg)   LMP 09/01/2020   BMI 29.95 kg/m²       Physical Exam:   Constitutional  · Appearance: well-nourished, well developed, alert, in no acute distress    HENT  · Head  · Face: appears normal  · Eyes: appear normal  · Ears: normal  · Mouth: normal  · Lips: no lesions    Neck  · Inspection/Palpation: normal appearance, no masses or tenderness  · Lymph Nodes: no lymphadenopathy present  · Thyroid: gland size normal, nontender, no nodules or masses present on palpation    Chest  · Respiratory Effort: breathing unlabored  · Auscultation: normal breath sounds    Cardiovascular  · Heart:  · Auscultation: regular rate and rhythm without murmur    Breasts  · Inspection of Breasts: breasts symmetrical, no skin changes, no discharge present, nipple appearance normal, no skin retraction present  · Palpation of Breasts and Axillae: no masses present on palpation, no breast tenderness  · Axillary Lymph Nodes: no lymphadenopathy present    Gastrointestinal  · Abdominal Examination: abdomen non-tender to palpation, normal bowel sounds, no masses present  · Liver and spleen: no hepatomegaly present, spleen not palpable  · Hernias: no hernias identified    Genitourinary  · External Genitalia: normal appearance for age, no discharge present, no tenderness present, no inflammatory lesions present, no masses present, no atrophy present  · Vagina: normal vaginal vault without central or paravaginal defects, no discharge present, no inflammatory lesions present, no masses present  · Bladder: non-tender to palpation  · Urethra: appears normal  · Cervix: normal appearing with discharge or lesions, os closed  · Uterus: enlarged, normal shape, soft  · Adnexa: no adnexal tenderness present, no adnexal masses present  · Perineum: perineum within normal limits, no evidence of trauma, no rashes or skin lesions present  · Anus: anus within normal limits, no hemorrhoids present  · Inguinal Lymph Nodes: no lymphadenopathy present    Skin  · General Inspection: no rash, no lesions identified    Neurologic/Psychiatric  · Mental Status:  · Orientation: grossly oriented to person, place and time  · Mood and Affect: mood normal, affect appropriate    Assessment:   Irregular cycles  Encounter Diagnoses   Name Primary?  Prenatal care of primigravida, antepartum Yes    Essential hypertension     Attention deficit disorder, unspecified hyperactivity presence      Due date: LMP   Fh cardiac \"heart murmur\" and irreg FHT  FH DS  FH child needing heart surgery: pt sister  Wellbutrin exposure  CHTN      Plan:   We discussed options of genetic screening and diagnostic testing including:  CF testing, CVS, amniocentesis first trimester screening/NT, MSAFP, and NIPT (handout given to patient for review and consent)  Reviewed rba of ssri in pregnancy, pt will consider weaning, disc risk of exposure to fetus during pregnancy  We discussed the impact of hypertension on pregnancy and the impact of pregnancy on the patient's hypertension. We discussed increased maternal risks of hypertension in the pregnancy, including risks of stroke and heart attack, end-organ disease, as well as her increased risk for developing pre-eclampsia (super-imposed on chronic hypertension) in the pregnancy. We discussed increased risks of hypertension and pregnancy included but were not limited to higher risk for intervention, induction of labor,  delivery, placental abruption, stillbirth and  delivery. We discussed the importance of good blood pressure control, and monitoring for changes in maternal and fetal status during pregnancy and I referred her to [facility] for additional counseling and monitoring. We will do a baseline 24 hour urine collection for creatinine and protein calculation.      I recommended taking a daily 81 mg aspirin during pregnancy to potentially decrease risks of developing pre-eclampsia. She is interested in prenatal genetic testing of her fetus. Plan: NIPS, 20 FS  The course of pregnancy discussed including visit schedule, ultrasounds, lab testing, etc.  Pt advised to avoid alcoholic beverages and illicit/recreational drugs use  Recommend taking prenatal vitamins or folic acid daily with DHA/fish oil. The hospital and practice style discussed with coverage system. We discussed nutrition, toxoplasmosis precautions, sexual activity, exercise, need for influenza vaccine, environmental and work hazards, travel advice, screen for domestic violence, need for seat belts. We discussed seafood, unpasteurized dairy products, deli meat, artificial sweeteners, and caffeine intake. We recommend avoiding chemical and toxin exposures when possible. Information on prenatal and breastfeeding classes given. Information on circumcision given  Patient encouraged not to smoke. Discussed current prescription drug use. Given medication list.  Discussed the use of over the counter medications and chemicals. She is advised to contact her MD with any questions. Pt understands risk of hemorrhage during pregnancy and post delivery and would accept blood products if necessary in life-threatening emergencies  We discussed signs and symptoms of abnormal pregnancies and miscarriage. Handouts given to pt. Physician review of ultrasound performed by technician  TA ULTRASOUND PERFORMED. A SINGLE VIABLE 9W1D IUP IS SEEN WITH NORMAL CARDIAC RHYTHM. GESTATIONAL AGE BASED ON TODAYS US.  A NORMAL APPEARING [address] IS SEEN. RIGHT & LEFT ADNEXA APPEAR WITHIN NORMAL LIMITS. NO FREE FLUID SEEN IN THE CDS. Today's ultrasound report and images were reviewed and discussed with the patient.   Please see images and imaging report entered by technician in PACS for more detail and progress note and diagnosis entered by MD.    Claudeen Angles, MD    Orders Placed This Encounter    CULTURE, URINE  CHLAMYDIA/GC PCR    HEP B SURFACE AG    HIV SCREEN, 4TH GEN. W/REFLEX CONFIRM    CBC W/O DIFF    RUBELLA AB, IGG    T PALLIDUM AB    REFERRAL TO MATERNAL FETAL MEDICINE    TYPE & SCREEN    buPROPion XL (WELLBUTRIN XL) 150 mg tablet    methyldopa (ALDOMET) 500 mg tablet     Follow-up and Dispositions    · Return in about 4 weeks (around 12/4/2020).

## 2020-11-14 LAB
ABO GROUP BLD: NORMAL
BACTERIA UR CULT: NORMAL
BLD GP AB SCN SERPL QL: NEGATIVE
C TRACH RRNA SPEC QL NAA+PROBE: NEGATIVE
CFTR MUT ANL BLD/T: NORMAL
CLINICAL INFO: NORMAL
ERYTHROCYTE [DISTWIDTH] IN BLOOD BY AUTOMATED COUNT: 13 % (ref 11.7–15.4)
ETHNIC BACKGROUND STATED: NORMAL
GENE DIS ANL CARRIER INTERP-IMP: NORMAL
GENERAL COMMENTS: NORMAL
GENETIC COUNSELOR, 450001: NORMAL
HBV SURFACE AG SERPL QL IA: NEGATIVE
HCT VFR BLD AUTO: 33.9 % (ref 34–46.6)
HGB BLD-MCNC: 11.2 G/DL (ref 11.1–15.9)
HIV 1+2 AB+HIV1 P24 AG SERPL QL IA: NON REACTIVE
INDICATION: NORMAL
LAB DIRECTOR NAME PROVIDER: NORMAL
MCH RBC QN AUTO: 29.8 PG (ref 26.6–33)
MCHC RBC AUTO-ENTMCNC: 33 G/DL (ref 31.5–35.7)
MCV RBC AUTO: 90 FL (ref 79–97)
N GONORRHOEA RRNA SPEC QL NAA+PROBE: NEGATIVE
PLATELET # BLD AUTO: 230 X10E3/UL (ref 150–450)
RBC # BLD AUTO: 3.76 X10E6/UL (ref 3.77–5.28)
REF LAB TEST METHOD: NORMAL
RH BLD: POSITIVE
RUBV IGG SERPL IA-ACNC: 1.39 INDEX
SMN1 GENE MUT ANL BLD/T: NORMAL
SPECIMEN SOURCE: NORMAL
TEST PERFORMANCE INFO SPEC: NORMAL
TEST PERFORMANCE INFO SPEC: NORMAL
TREPONEMA PALLIDUM IGG+IGM AB [PRESENCE] IN SERUM OR PLASMA BY IMMUNOASSAY: NON REACTIVE
WBC # BLD AUTO: 9.1 X10E3/UL (ref 3.4–10.8)

## 2020-11-16 ENCOUNTER — TELEPHONE (OUTPATIENT)
Dept: OBGYN CLINIC | Age: 30
End: 2020-11-16

## 2020-11-16 NOTE — TELEPHONE ENCOUNTER
11/16/20- 1:55 pm-  Patient contacted [facility] today  Generic or brand in this medication is just unavailable. 1)  She would like to look into alternative. She has 3 days worth left. .... 2)  She just got a call for [facility] to schedule and do an ultrasound. She said she is scheduled she thinks for 12 weeks, she thought she needed to do a growth US at 20. Is this too soon for what you were looking for?

## 2020-11-16 NOTE — TELEPHONE ENCOUNTER
Patient of Michael Dubose was sent on 11/6/20 for blood pressure med (need to confirm methyldopa (Aldomet) 500 mg 1 tab bid #60 1 refill. Patient is having trouble getting medication from pharmacy but no details left.

## 2020-11-16 NOTE — TELEPHONE ENCOUNTER
She checked with a [facility] and the medication was unavailable from . [facility] tells her at [facility] that they have ordered it, since 11/6/20, but it never comes in. She will check with them today to update status and call us to give us the latest information. She will call around to different pharmacies, like [facility] to see if anyone has any. Will call us back prn if it is just not available. She said TP told her someone else had trouble getting it as well.

## 2020-11-17 RX ORDER — LABETALOL 100 MG/1
100 TABLET, FILM COATED ORAL 2 TIMES DAILY
Qty: 60 TAB | Refills: 1 | Status: SHIPPED | OUTPATIENT
Start: 2020-11-17 | End: 2021-01-11

## 2020-11-17 NOTE — TELEPHONE ENCOUNTER
[facility]. Patient advised to stop Aldomet. Patient advised to have NT at [facility] at 12 weeks and then growth US at [facility] at 20 per RB.

## 2020-11-17 NOTE — TELEPHONE ENCOUNTER
For BP alternative in pregnancy can change to   Labetalol 100mg po bid  #60  1 refills  (stop aldomet)    For US; can you check with RB, re referral?

## 2020-12-02 ENCOUNTER — HOSPITAL ENCOUNTER (OUTPATIENT)
Dept: PERINATAL CARE | Age: 30
Discharge: HOME | End: 2020-12-02
Attending: OBSTETRICS & GYNECOLOGY

## 2020-12-02 PROCEDURE — 76801 OB US < 14 WKS SINGLE FETUS: CPT | Performed by: OBSTETRICS & GYNECOLOGY

## 2020-12-02 PROCEDURE — 76813 OB US NUCHAL MEAS 1 GEST: CPT | Performed by: OBSTETRICS & GYNECOLOGY

## 2020-12-02 NOTE — PATIENT INSTRUCTIONS

## 2020-12-03 ENCOUNTER — ROUTINE PRENATAL (OUTPATIENT)
Dept: OBGYN CLINIC | Age: 30
End: 2020-12-03

## 2020-12-03 VITALS — DIASTOLIC BLOOD PRESSURE: 74 MMHG | SYSTOLIC BLOOD PRESSURE: 132 MMHG | WEIGHT: 184 LBS | BODY MASS INDEX: 30.62 KG/M2

## 2020-12-03 DIAGNOSIS — I10 CHRONIC HYPERTENSION: ICD-10-CM

## 2020-12-03 DIAGNOSIS — Z34.00 PRENATAL CARE OF PRIMIGRAVIDA, ANTEPARTUM: Primary | ICD-10-CM

## 2020-12-03 LAB — NIPT, EXTERNAL: NORMAL

## 2020-12-03 PROCEDURE — 0502F SUBSEQUENT PRENATAL CARE: CPT | Performed by: OBSTETRICS & GYNECOLOGY

## 2020-12-03 PROCEDURE — 99212 OFFICE O/P EST SF 10 MIN: CPT | Performed by: OBSTETRICS & GYNECOLOGY

## 2020-12-04 LAB
ALBUMIN SERPL-MCNC: 4.1 G/DL (ref 3.8–4.8)
ALBUMIN/GLOB SERPL: 2.1 {RATIO} (ref 1.2–2.2)
ALP SERPL-CCNC: 50 IU/L (ref 39–117)
ALT SERPL-CCNC: 14 IU/L (ref 0–32)
AST SERPL-CCNC: 13 IU/L (ref 0–40)
BILIRUB SERPL-MCNC: <0.2 MG/DL (ref 0–1.2)
BUN SERPL-MCNC: 10 MG/DL (ref 6–20)
BUN/CREAT SERPL: 20 (ref 9–23)
CALCIUM SERPL-MCNC: 8.7 MG/DL (ref 8.7–10.2)
CHLORIDE SERPL-SCNC: 104 MMOL/L (ref 96–106)
CO2 SERPL-SCNC: 21 MMOL/L (ref 20–29)
CREAT SERPL-MCNC: 0.51 MG/DL (ref 0.57–1)
GLOBULIN SER CALC-MCNC: 2 G/DL (ref 1.5–4.5)
GLUCOSE SERPL-MCNC: 89 MG/DL (ref 65–99)
POTASSIUM SERPL-SCNC: 4.4 MMOL/L (ref 3.5–5.2)
PROT SERPL-MCNC: 6.1 G/DL (ref 6–8.5)
SODIUM SERPL-SCNC: 137 MMOL/L (ref 134–144)

## 2020-12-17 ENCOUNTER — ROUTINE PRENATAL (OUTPATIENT)
Dept: OBGYN CLINIC | Age: 30
End: 2020-12-17
Payer: COMMERCIAL

## 2020-12-17 VITALS
HEIGHT: 65 IN | BODY MASS INDEX: 30.82 KG/M2 | SYSTOLIC BLOOD PRESSURE: 116 MMHG | DIASTOLIC BLOOD PRESSURE: 66 MMHG | WEIGHT: 185 LBS

## 2020-12-17 DIAGNOSIS — I10 CHRONIC HYPERTENSION: ICD-10-CM

## 2020-12-17 DIAGNOSIS — Z34.00 PRENATAL CARE OF PRIMIGRAVIDA, ANTEPARTUM: Primary | ICD-10-CM

## 2020-12-17 DIAGNOSIS — I10 ESSENTIAL HYPERTENSION: ICD-10-CM

## 2020-12-17 DIAGNOSIS — O10.019 PRE-EXISTING ESSENTIAL HYPERTENSION DURING PREGNANCY, ANTEPARTUM: ICD-10-CM

## 2020-12-17 PROCEDURE — 99212 OFFICE O/P EST SF 10 MIN: CPT | Performed by: OBSTETRICS & GYNECOLOGY

## 2020-12-17 PROCEDURE — 0502F SUBSEQUENT PRENATAL CARE: CPT | Performed by: OBSTETRICS & GYNECOLOGY

## 2020-12-17 NOTE — PROGRESS NOTES
NIPS low risk  Notify pt if Noomeo message not read or not active. Notify pt of gender, if desired.   Add to PL: NIPS- low risk add gender if pt finds out  Update PNL  Confirm 20 wk US scheduled: add date and location (DANIEL/M)  to PL

## 2020-12-17 NOTE — PROGRESS NOTES
_ [facility]  [email or web address]  [phone]    Davon Levine MD, FACOG     Follow-up OB visit    Chief Complaint   Patient presents with   [facility] Routine Prenatal Visit       Patient Active Problem List    Diagnosis Date Noted    Prenatal care of primigravida, antepartum 2020    Sepsis ([address] 2016    ADD (attention deficit disorder) 2014    Depression 2014          The patient reports the following concerns: none    Vitals:    20 1425   BP: 116/66   Weight: 185 lb (83.9 kg)   Height: 5' 5\" (1.651 m)     See PN flowsheet for exam    31 y.o.  15w2d   Encounter Diagnoses   Name Primary?  Prenatal care of primigravida, antepartum Yes    Chronic hypertension      Sign release to share results w PCP  rec [facility] fu  ?afp nv  FS     [] SAB/bleeding precautions reviewed   [] PTL/PPROM precautions reviewed   [] Labor precautions reviewed   [] Fetal kick counts discussed   [] Labs reviewed with patient   [] Mayme Hopes precautions reviewed   [] Consent reviewed   [] Handouts given to pt   [] Glucola handout    [] GBS/labor/Magic Hour handout   []    [] We reviewed CDC recommendations for Tdap for patient and close contacts and RBA of receiving in pregnancy, advised obtaining in third trimester   [] Reviewed healthy nutrition in pregnancy and good exercise practices   [] We disc safer medications in pregnancy and referred patient to [facility] DANIEL resources   [] We reviewed CDC recommendations for flu vaccine and RBA of receiving in pregnancy   []    []    []       Follow-up and Dispositions    · Return in about 4 weeks (around 2021) for Follow up OB visit.          Orders Placed This Encounter    GLUCOSE, GESTATIONAL 1 HR TOLERANCE    AFP, MATERNAL SCREEN       Davon Levine MD

## 2020-12-18 DIAGNOSIS — Z34.00 PRENATAL CARE OF PRIMIGRAVIDA, ANTEPARTUM: ICD-10-CM

## 2020-12-18 NOTE — PROGRESS NOTES
Recorded panorama results, added it to prob list, patient was notified of normal results through [facility]. I do not see that 20 wk FS is scheduled at [facility] yet.

## 2020-12-20 LAB
AFP ADJ MOM SERPL: 0.6
AFP INTERP SERPL-IMP: NORMAL
AFP INTERP SERPL-IMP: NORMAL
AFP SERPL-MCNC: 15.7 NG/ML
AGE AT DELIVERY: 32.3 YR
COMMENT, 018013: NORMAL
GA METHOD: NORMAL
GA: 15.2 WEEKS
GLUCOSE 1H P 50 G GLC PO SERPL-MCNC: 74 MG/DL (ref 65–139)
IDDM PATIENT QL: NORMAL
MULTIPLE PREGNANCY: NORMAL
NEURAL TUBE DEFECT RISK FETUS: NORMAL %
RESULTS, 017004: NORMAL

## 2020-12-22 DIAGNOSIS — Z34.00 PRENATAL CARE OF PRIMIGRAVIDA, ANTEPARTUM: Primary | ICD-10-CM

## 2020-12-22 NOTE — PROGRESS NOTES
Normal results, add to prenatal records. We can review in detail with patient at next visit.   Add early glucola wnl to PL, repeat 26-28 w

## 2020-12-22 NOTE — PROGRESS NOTES
MFM US/NT-ultrasound component normal.  Update prenatals. Confirm NIPS/1st trimester serum results in chart.   Coordinate MFM US 20 wk with DANIEL obando.  Confirm early glucola planned for NV( pt aware and on schedule)

## 2021-01-09 ENCOUNTER — TELEPHONE (OUTPATIENT)
Dept: OBGYN CLINIC | Age: 31
End: 2021-01-09

## 2021-01-11 RX ORDER — LABETALOL 100 MG/1
TABLET, FILM COATED ORAL
Qty: 60 TAB | Refills: 3 | Status: SHIPPED | OUTPATIENT
Start: 2021-01-11 | End: 2021-03-24

## 2021-01-11 NOTE — TELEPHONE ENCOUNTER
31year old patient last seen in the office on 12/17/2020 18w6d pregnant patient      ? Ok to refill has pended from the pharmacy      Patient has next appointment on 1/2/7/2021    Please amend/sign pended prescription from the pharmacy.

## 2021-01-27 ENCOUNTER — HOSPITAL ENCOUNTER (OUTPATIENT)
Dept: PERINATAL CARE | Age: 31
Discharge: HOME | End: 2021-01-27
Attending: OBSTETRICS & GYNECOLOGY
Payer: COMMERCIAL

## 2021-01-27 ENCOUNTER — ROUTINE PRENATAL (OUTPATIENT)
Dept: OBGYN CLINIC | Age: 31
End: 2021-01-27
Payer: COMMERCIAL

## 2021-01-27 VITALS
DIASTOLIC BLOOD PRESSURE: 71 MMHG | BODY MASS INDEX: 32.22 KG/M2 | HEART RATE: 65 BPM | SYSTOLIC BLOOD PRESSURE: 121 MMHG | WEIGHT: 193.6 LBS

## 2021-01-27 DIAGNOSIS — Z34.00 PRENATAL CARE OF PRIMIGRAVIDA, ANTEPARTUM: ICD-10-CM

## 2021-01-27 DIAGNOSIS — O10.019 PRE-EXISTING ESSENTIAL HYPERTENSION DURING PREGNANCY, ANTEPARTUM: Primary | ICD-10-CM

## 2021-01-27 DIAGNOSIS — R01.1 HEART MURMUR: ICD-10-CM

## 2021-01-27 PROCEDURE — 76811 OB US DETAILED SNGL FETUS: CPT | Performed by: OBSTETRICS & GYNECOLOGY

## 2021-01-27 PROCEDURE — 0502F SUBSEQUENT PRENATAL CARE: CPT | Performed by: OBSTETRICS & GYNECOLOGY

## 2021-01-27 NOTE — PATIENT INSTRUCTIONS

## 2021-01-27 NOTE — PROGRESS NOTES
[address] [facility]  [email or web address]/  [phone]    Stefani Marcus MD, FACOG       [facility] [place]   FOLLOW UP OB NOTE WITH ULTRASOUND    Chief Complaint   Patient presents with    Routine Prenatal Visit       The patient reports the following concerns: doing well, mfm today, trace blood in urine, 24 hr urine given today    I discussed with the patient the limitations of ultrasound and that imaging can not rule out all birth defects, chromosomal problems, or other problems with the baby. US findings:  Single living IUP with normal fetal biometry and fluid for ega. NO fetal dysmorphology is noted; I do not  suspect CHD, but not all cardiac anomalies can be excluded in utero. Normal placenta is noted. Suboptimally visualized cervix; no abnormality suspected.     Cards referral: no recent fu, for pt  Peds cards fu: for FH, for fetus  24 hr urine instructions given  Peds list given

## 2021-02-19 ENCOUNTER — ROUTINE PRENATAL (OUTPATIENT)
Dept: OBGYN CLINIC | Age: 31
End: 2021-02-19
Payer: COMMERCIAL

## 2021-02-19 VITALS
HEART RATE: 87 BPM | WEIGHT: 196.8 LBS | BODY MASS INDEX: 32.79 KG/M2 | DIASTOLIC BLOOD PRESSURE: 74 MMHG | SYSTOLIC BLOOD PRESSURE: 127 MMHG | HEIGHT: 65 IN

## 2021-02-19 DIAGNOSIS — O10.019 PRE-EXISTING ESSENTIAL HYPERTENSION DURING PREGNANCY, ANTEPARTUM: Primary | ICD-10-CM

## 2021-02-19 DIAGNOSIS — Z34.00 PRENATAL CARE OF PRIMIGRAVIDA, ANTEPARTUM: ICD-10-CM

## 2021-02-19 PROCEDURE — 0502F SUBSEQUENT PRENATAL CARE: CPT | Performed by: OBSTETRICS & GYNECOLOGY

## 2021-02-19 NOTE — PROGRESS NOTES
_ [facility]  [email or web address]  [phone]    Ekaterina Hedrick MD, FACOG     Follow-up OB visit    Chief Complaint   Patient presents with    Routine Prenatal Visit       Patient Active Problem List    Diagnosis Date Noted    Pre-existing essential hypertension during pregnancy, antepartum 2020    Prenatal care of primigravida, antepartum 2020    Sepsis ([address] 2016    ADD (attention deficit disorder) 2014    Depression 2014          The patient reports the following concerns: none, brought 24 hr urine    There were no vitals filed for this visit. See PN flowsheet for exam    27 y.o.  24w3d   Encounter Diagnoses   Name Primary?  Pre-existing essential hypertension during pregnancy, antepartum Yes    Prenatal care of primigravida, antepartum      pih precautions  Disc timing of delivery  Disc r/b/a of induction and pitocin use and increase risk of longer labor,  section, bleeding and infection. 24 hr urine, brought today     [] SAB/bleeding precautions reviewed   [] PTL/PPROM precautions reviewed   [] Labor precautions reviewed   [] Fetal kick counts discussed   [] Labs reviewed with patient   [] Silver Rhody precautions reviewed   [] Consent reviewed   [] Handouts given to pt   [] Glucola handout    [] GBS/labor/Magic Hour handout   []    [] We reviewed CDC recommendations for Tdap for patient and close contacts and RBA of receiving in pregnancy, advised obtaining in third trimester   [] Reviewed healthy nutrition in pregnancy and good exercise practices   [] We disc safer medications in pregnancy and referred patient to [facility] DANIEL resources   [] We reviewed CDC recommendations for flu vaccine and RBA of receiving in pregnancy   []    []    []       Follow-up and Dispositions    · Return in about 4 weeks (around 3/19/2021).          Orders Placed This Encounter    PROTEIN, URINE, 24 HR    CREATININE, UR, 24 HR       Ekaterina Hedrick, MD

## 2021-02-20 LAB
CREAT 24H UR-MRATE: 1278 MG/24 HR (ref 800–1800)
CREAT UR-MCNC: 71 MG/DL
PROT 24H UR-MRATE: 126 MG/24 HR (ref 30–150)
PROT UR-MCNC: 7 MG/DL
SPECIMEN STATUS REPORT, ROLRST: NORMAL

## 2021-02-20 NOTE — PROGRESS NOTES
Normal results, add to prenatal records. We can review in detail with patient at next visit. [address] message sent if active.   Add to PL: 24 hr ur prot 126 mg

## 2021-02-26 ENCOUNTER — HOSPITAL ENCOUNTER (EMERGENCY)
Age: 31
Discharge: HOME | End: 2021-02-26
Attending: OBSTETRICS & GYNECOLOGY | Admitting: OBSTETRICS & GYNECOLOGY
Payer: COMMERCIAL

## 2021-02-26 ENCOUNTER — TELEPHONE (OUTPATIENT)
Dept: OBGYN CLINIC | Age: 31
End: 2021-02-26

## 2021-02-26 VITALS
TEMPERATURE: 98.3 F | HEIGHT: 64 IN | WEIGHT: 196 LBS | BODY MASS INDEX: 33.46 KG/M2 | HEART RATE: 81 BPM | SYSTOLIC BLOOD PRESSURE: 127 MMHG | RESPIRATION RATE: 16 BRPM | DIASTOLIC BLOOD PRESSURE: 70 MMHG

## 2021-02-26 DIAGNOSIS — Z34.00 PRENATAL CARE OF PRIMIGRAVIDA, ANTEPARTUM: ICD-10-CM

## 2021-02-26 PROCEDURE — 99283 EMERGENCY DEPT VISIT LOW MDM: CPT

## 2021-02-26 PROCEDURE — 59025 FETAL NON-STRESS TEST: CPT | Performed by: OBSTETRICS & GYNECOLOGY

## 2021-02-26 NOTE — TELEPHONE ENCOUNTER
Patient calling back and states she was caught in traffic and not able to make the office appointment,    Work in MD for the afternoon Dr Maurisio Ellis advised for the patient to go to labor and delivery to be monitored      This nurse called report to labor and delivery    Patient verbalized understanding.

## 2021-02-26 NOTE — PROGRESS NOTES
1227 Patient is a G1 at 25 weeks 3 days here for decreased fetal movement. States that on Wednesday she did not feel the baby move until the evening, and Thursday only felt the baby move a handful of times. This morning, states she has not felt the baby this morning until sitting in the waiting room here. Patient has chronic hypertension and states she is prediabetic. Was seen by high risk doctors at [address] [place] [address] due to cardiac history on both sides (her and her ). [id number] RN having difficulty keeping EFM on due to audible active fetal movement on EFM. 1238 Admission assessment completed. 1308 Discussing patient with Dr. Beltran Farias. Given reactive monitoring and lots of fetal movement heard by RN on [address]ending [address], MD states to discharge home. 1310 Patient is feeling more movement now that she has been sitting here. States she notices the bigger movements but not feeling the smaller movements. Does state that her boss has been back at work the past few days and does not allow her to eat or drink while working and notices that since that has happened, her movement has been decreased. RN encouraging patient to eat and drink regularly at work. States understanding. 1315 Patient removed from EFM. Educated again to eat and drink regularly. 1335 Reviewed and signed discharge papers with patient. States understanding. Has follow up with Dr. Shauna Jara March 10. Signed copy of discharge papers placed on hard chart. 1339 Patient ambulatory off unit in stable condition. Accompanied by her significant other. Discharged home.

## 2021-02-26 NOTE — DISCHARGE INSTRUCTIONS
Patient Education        Counting Your Baby's Kicks: Care Instructions  Your Care Instructions     Counting your baby's kicks is one way your doctor can tell that your baby is healthy. Most women--especially in a first pregnancy--feel their baby move for the first time between 16 and 22 weeks. The movement may feel like flutters rather than kicks. Your baby may move more at certain times of the day. When you are active, you may notice less kicking than when you are resting. At your prenatal visits, your doctor will ask whether the baby is active. In your last trimester, your doctor may ask you to count the number of times you feel your baby move. Follow-up care is a key part of your treatment and safety. Be sure to make and go to all appointments, and call your doctor if you are having problems. It's also a good idea to know your test results and keep a list of the medicines you take. How do you count fetal kicks? · A common method of checking your baby's movement is to count the number of kicks or moves you feel in 1 hour. Ten movements (such as kicks, flutters, or rolls) in 1 hour are normal. Some doctors suggest that you count in the morning until you get to 10 movements. Then you can quit for that day and start again the next day. · Pick your baby's most active time of day to count. This may be any time from morning to evening. · If you do not feel 10 movements in an hour, your baby may be sleeping. Wait for the next hour and count again. When should you call for help? Call your doctor now or seek immediate medical care if:    · You noticed that your baby has stopped moving or is moving much less than normal.   Watch closely for changes in your health, and be sure to contact your doctor if you have any problems. Where can you learn more? Go to http://www.gray.com/  Enter [id number] in the search box to learn more about \"Counting Your Baby's Kicks: Care Instructions. \"  Current as of: February 11, 2020               Content Version: 12.6  © 2006-2020 [employer]. Care instructions adapted under license by [employer] (which disclaims liability or warranty for this information). If you have questions about a medical condition or this instruction, always ask your healthcare professional. [address] any warranty or liability for your use of this information. Patient Education        Learning About When to Call Your Doctor During Pregnancy (After 20 Weeks)  Your Care Instructions  It's common to have concerns about what might be a problem during pregnancy. Although most pregnant women don't have any serious problems, it's important to know when to call your doctor if you have certain symptoms or signs of labor. These are general suggestions. Your doctor may give you some more information about when to call. When to call your doctor (after 20 weeks)  Call 911 anytime you think you may need emergency care. For example, call if:  · You have severe vaginal bleeding. · You have sudden, severe pain in your belly. · You passed out (lost consciousness). · You have a seizure. · You see or feel the umbilical cord. · You think you are about to deliver your baby and can't make it safely to the hospital.  Call your doctor now or seek immediate medical care if:  · You have vaginal bleeding. · You have belly pain. · You have a fever. · You have symptoms of preeclampsia, such as:  ? Sudden swelling of your face, hands, or feet. ? New vision problems (such as dimness, blurring, or seeing spots). ? A severe headache. · You have a sudden release of fluid from your vagina. (You think your water broke.)  · You think that you may be in labor. This means that you've had at least 6 contractions in an hour. · You notice that your baby has stopped moving or is moving much less than normal.  · You have symptoms of a urinary tract infection.  These may include:  ? Pain or burning when you urinate. ? A frequent need to urinate without being able to pass much urine. ? Pain in the flank, which is just below the rib cage and above the waist on either side of the back. ? Blood in your urine. Watch closely for changes in your health, and be sure to contact your doctor if:  · You have vaginal discharge that smells bad. · You have skin changes, such as:  ? A rash. ? Itching. ? Yellow color to your skin. · You have other concerns about your pregnancy. If you have labor signs at 37 weeks or more  If you have signs of labor at 37 weeks or more, your doctor may tell you to call when your labor becomes more active. Symptoms of active labor include:  · Contractions that are regular. · Contractions that are less than 5 minutes apart. · Contractions that are hard to talk through. Follow-up care is a key part of your treatment and safety. Be sure to make and go to all appointments, and call your doctor if you are having problems. It's also a good idea to know your test results and keep a list of the medicines you take. Where can you learn more? Go to http://www.gray.com/  Enter N531 in the search box to learn more about \"Learning About When to Call Your Doctor During Pregnancy (After 20 Weeks). \"  Current as of: February 11, 2020               Content Version: 12.6  © 8188-3074 [employer]. Care instructions adapted under license by [employer] (which disclaims liability or warranty for this information). If you have questions about a medical condition or this instruction, always ask your healthcare professional. [place] [address] any warranty or liability for your use of this information.

## 2021-02-26 NOTE — PROCEDURES
NST procedure note    Cortez Martin is a ,  27 y.o. female WHITE whose LMP  was on  who presents for fetal non-stress test.    She is 25w3d and was monitored for 20 minutes and the FHR was reactive, with accelerations. NST Interpretation:    FHR baseline 150 bpm, variability moderate, accelerations present, decelerations Absent. Uterine contractions were absent. Edwin Ellsworth was informed of the NST results and her questions were answered. Disposition:    - return to clinic as scheduled       Audible movement per nursing.   Pt will be discharged home

## 2021-02-26 NOTE — TELEPHONE ENCOUNTER
Call received at 10:55am      27year old patient last seen in the office on 2/19/2021    G1P 25w3d pregnant     Patient calling to say that she had little movement evening on 2/24/2021, 2/25/2021 a little bit of movement and today so far has not felt the baby . Patient reports she has eaten a donut and been drinking water      Patient was placed on the schedule to be seen at 11:30 am per Dr. Trevor Gaspar aware of added on appointment      Patient verbalized understanding.

## 2021-03-09 NOTE — PROGRESS NOTES
Kem Hercules is a 27 y.o. female who presents for routine immunizations of TDAP. She denies any symptoms , reactions or allergies that would exclude them from being immunized today. Risks and adverse reactions were discussed and the VIS was given to them. All questions were addressed. She was observed for 15 min post injection. There were no reactions observed. [place], [place].

## 2021-03-09 NOTE — PATIENT INSTRUCTIONS
Weeks 26 to 30 of Your Pregnancy: Care Instructions Your Care Instructions You are now in your last trimester of pregnancy. Your baby is growing rapidly. And you'll probably feel your baby moving around more often. Your doctor may ask you to count your baby's kicks. Your back may ache as your body gets used to your baby's size and length. If you haven't already had the Tdap shot during this pregnancy, talk to your doctor about getting it. It will help protect your  against pertussis infection. During this time, it's important to take care of yourself and pay attention to what your body needs. If you feel sexual, explore ways to be close with your partner that match your comfort and desire. Use the tips provided in this care sheet to find ways to be sexual in your own way. Follow-up care is a key part of your treatment and safety. Be sure to make and go to all appointments, and call your doctor if you are having problems. It's also a good idea to know your test results and keep a list of the medicines you take. How can you care for yourself at home? Take it easy at work · Take frequent breaks. If possible, stop working when you are tired, and rest during your lunch hour. · Take bathroom breaks every 2 hours. · Change positions often. If you sit for long periods, stand up and walk around. · When you stand for a long time, keep one foot on a low stool with your knee bent. After standing a lot, sit with your feet up. · Avoid fumes, chemicals, and tobacco smoke. Be sexual in your own way · Having sex during pregnancy is okay, unless your doctor tells you not to. · You may be very interested in sex, or you may have no interest at all. · Your growing belly can make it hard to find a good position during intercourse. [place] and explore. · You may get cramps in your uterus when your partner touches your breasts.  
· A back rub may relieve the backache or cramps that sometimes follow orgasm. Learn about  labor · Watch for signs of  labor. You may be going into labor if: 
? You have menstrual-like cramps, with or without nausea. ? You have about 6 or more contractions in 1 hour, even after you have had a glass of water and are resting. ? You have a low, dull backache that does not go away when you change your position. ? You have pain or pressure in your pelvis that comes and goes in a pattern. ? You have intestinal cramping or flu-like symptoms, with or without diarrhea. 
? You notice an increase or change in your vaginal discharge. Discharge may be heavy, mucus-like, watery, or streaked with blood. ? Your water breaks. · If you think you have  labor: ? Drink 2 or 3 glasses of water or juice. Not drinking enough fluids can cause contractions. ? Stop what you are doing, and empty your bladder. Then lie down on your left side for at least 1 hour. ? While lying on your side, find your breast bone. Put your fingers in the soft spot just below it. Move your fingers down toward your belly button to find the top of your uterus. Check to see if it is tight. ? Contractions can be weak or strong. Record your contractions for an hour. Time a contraction from the start of one contraction to the start of the next one. 
? Single or several strong contractions without a pattern are called Ej-Turner contractions. They are practice contractions but not the start of labor. They often stop if you change what you are doing. ? Call your doctor if you have regular contractions. Where can you learn more? Go to http://www.gray.com/ Enter G050 in the search box to learn more about \"Weeks 26 to 30 of Your Pregnancy: Care Instructions. \" Current as of: 2020               Content Version: 12.6 © 4700-3845 [employer].   
Care instructions adapted under license by [employer] (which disclaims liability or warranty for this information). If you have questions about a medical condition or this instruction, always ask your healthcare professional. [address] any warranty or liability for your use of this information. Vaccine Information Statement Tdap (Tetanus, Diphtheria, Pertussis) Vaccine: What you need to know Many Vaccine Information Statements are available in [place] sorbian and other languages. See www.immunize.org/vis Hojas de información sobre vacunas están disponibles en español y en muchos otros idiomas. Visite www.immunize.org/vis 1. Why get vaccinated? Tdap vaccine can prevent tetanus, diphtheria, and pertussis. Diphtheria and pertussis spread from person to person. Tetanus enters the body through cuts or wounds.  TETANUS (T) causes painful stiffening of the muscles. Tetanus can lead to serious health problems, including being unable to open the mouth, having trouble swallowing and breathing, or death.  DIPHTHERIA (D) can lead to difficulty breathing, heart failure, paralysis, or death.  PERTUSSIS (aP), also known as whooping cough, can cause uncontrollable, violent coughing which makes it hard to breathe, eat, or drink. Pertussis can be extremely serious in babies and young children, causing pneumonia, convulsions, brain damage, or death. In teens and adults, it can cause weight loss, loss of bladder control, passing out, and rib fractures from severe coughing. 2. Tdap vaccine Tdap is only for children 7 years and older, adolescents, and adults. Adolescents should receive a single dose of Tdap, preferably at age 6 or 15 years. Pregnant women should get a dose of Tdap during every pregnancy, to protect the  from pertussis. Infants are most at risk for severe, life-threatening complications from pertussis. Adults who have never received Tdap should get a dose of Tdap.    
 
Also, adults should receive a booster dose every 10 years, or earlier in the case of a severe and dirty wound or burn. Booster doses can be either Tdap or Td (a different vaccine that protects against tetanus and diphtheria but not pertussis). Tdap may be given at the same time as other vaccines. 3. Talk with your health care provider Tell your vaccine provider if the person getting the vaccine: 
 Has had an allergic reaction after a previous dose of any vaccine that protects against tetanus, diphtheria, or pertussis, or has any severe, life-threatening allergies.  Has had a coma, decreased level of consciousness, or prolonged seizures within 7 days after a previous dose of any pertussis vaccine (DTP, DTaP, or Tdap).  Has seizures or another nervous system problem.  Has ever had Guillain-Barré Syndrome (also called GBS).  Has had severe pain or swelling after a previous dose of any vaccine that protects against tetanus or diphtheria. In some cases, your health care provider may decide to postpone Tdap vaccination to a future visit. People with minor illnesses, such as a cold, may be vaccinated. People who are moderately or severely ill should usually wait until they recover before getting Tdap vaccine. Your health care provider can give you more information. 4. Risks of a vaccine reaction  Pain, redness, or swelling where the shot was given, mild fever, headache, feeling tired, and nausea, vomiting, diarrhea, or stomachache sometimes happen after Tdap vaccine. People sometimes faint after medical procedures, including vaccination. Tell your provider if you feel dizzy or have vision changes or ringing in the ears. As with any medicine, there is a very remote chance of a vaccine causing a severe allergic reaction, other serious injury, or death. 5. What if there is a serious problem? An allergic reaction could occur after the vaccinated person leaves the clinic.  If you see signs of a severe allergic reaction (hives, swelling of the face and throat, difficulty breathing, a fast heartbeat, dizziness, or weakness), call 9-1-1 and get the person to the nearest hospital. 
 
For other signs that concern you, call your health care provider. Adverse reactions should be reported to the Vaccine Adverse Event Reporting System (VAERS). Your health care provider will usually file this report, or you can do it yourself. Visit the VAERS website at www.vaers. hhs.gov or call 0-657.845.3212. VAERS is only for reporting reactions, and VAERS staff do not give medical advice. 6. The National Vaccine Injury Compensation Program 
 
The [facility] Vaccine Injury Compensation Program (VICP) is a federal program that was created to compensate people who may have been injured by certain vaccines. Visit the VICP website at www.[facility].gov/vaccinecompensation or call 7-428.526.7461 to learn about the program and about filing a claim. There is a time limit to file a claim for compensation. 7. How can I learn more?  Ask your health care provider.  Call your local or state health department.  Contact the Centers for Disease Control and Prevention (CDC): 
- Call 0-319.553.9081 (1-800-CDC-INFO) or 
- Visit CDCs website at www.cdc.gov/vaccines Vaccine Information Statement (Interim) Tdap (Tetanus, Diphtheria, Pertussis) Vaccine 04/01/2020 
42 IGNACIA Miguel [id number] Department of Health and [employer] Centers for Disease Control and Prevention Office Use Only

## 2021-03-10 ENCOUNTER — HOSPITAL ENCOUNTER (OUTPATIENT)
Dept: PERINATAL CARE | Age: 31
Discharge: HOME | End: 2021-03-10
Attending: OBSTETRICS & GYNECOLOGY
Payer: COMMERCIAL

## 2021-03-10 ENCOUNTER — ROUTINE PRENATAL (OUTPATIENT)
Dept: OBGYN CLINIC | Age: 31
End: 2021-03-10
Payer: COMMERCIAL

## 2021-03-10 VITALS
DIASTOLIC BLOOD PRESSURE: 67 MMHG | SYSTOLIC BLOOD PRESSURE: 110 MMHG | WEIGHT: 198.8 LBS | HEART RATE: 66 BPM | HEIGHT: 64 IN | BODY MASS INDEX: 33.94 KG/M2

## 2021-03-10 DIAGNOSIS — Z3A.27 27 WEEKS GESTATION OF PREGNANCY: Primary | ICD-10-CM

## 2021-03-10 DIAGNOSIS — Z23 ENCOUNTER FOR IMMUNIZATION: ICD-10-CM

## 2021-03-10 DIAGNOSIS — Z34.00 PRENATAL CARE OF PRIMIGRAVIDA, ANTEPARTUM: ICD-10-CM

## 2021-03-10 DIAGNOSIS — O10.019 PRE-EXISTING ESSENTIAL HYPERTENSION DURING PREGNANCY, ANTEPARTUM: ICD-10-CM

## 2021-03-10 LAB — GTT, 1 HR, GLUCOLA, EXTERNAL: 124

## 2021-03-10 PROCEDURE — 90471 IMMUNIZATION ADMIN: CPT | Performed by: OBSTETRICS & GYNECOLOGY

## 2021-03-10 PROCEDURE — 90715 TDAP VACCINE 7 YRS/> IM: CPT | Performed by: OBSTETRICS & GYNECOLOGY

## 2021-03-10 PROCEDURE — 0502F SUBSEQUENT PRENATAL CARE: CPT | Performed by: OBSTETRICS & GYNECOLOGY

## 2021-03-10 PROCEDURE — 76816 OB US FOLLOW-UP PER FETUS: CPT | Performed by: OBSTETRICS & GYNECOLOGY

## 2021-03-10 NOTE — PROGRESS NOTES
_ [facility]  [email or web address]  [phone]    Sasha Stanley MD, FACOG     Follow-up OB visit    Chief Complaint   Patient presents with    Routine Prenatal Visit       Patient Active Problem List    Diagnosis Date Noted    Pre-existing essential hypertension during pregnancy, antepartum 2020    Prenatal care of primigravida, antepartum 2020    Sepsis ([address] 2016    ADD (attention deficit disorder) 2014    Depression 2014          The patient reports the following concerns: MFM today at [address], cbc, tdap, consent for delivery, 28w h/o given. Went to L&D because she felt decreased fetal movement. Improved with FM.     FHT by MFM   Vitals:    03/10/21 0916   BP: 110/67   Pulse: 66   Weight: 198 lb 12.8 oz (90.2 kg)   Height: 5' 4\" (1.626 m)     See PN flowsheet for exam    27 y.o. [address] [facility] 27w1d   Encounter Diagnoses   Name Primary?  Encounter for immunization     27 weeks gestation of pregnancy Yes    Pre-existing essential hypertension during pregnancy, antepartum     Prenatal care of primigravida, antepartum      PIH precautions. Disc IOL   Disc r/b/a of induction and pitocin use and increase risk of longer labor,  section, bleeding and infection.    Keep mfm fu     [] SAB/bleeding precautions reviewed   [] PTL/PPROM precautions reviewed   [] Labor precautions reviewed   [] Fetal kick counts discussed   [] Labs reviewed with patient   [] Sherlene Wyatt precautions reviewed   [] Consent reviewed   [] Handouts given to pt   [] Glucola handout    [] GBS/labor/Magic Hour handout   []    [] We reviewed CDC recommendations for Tdap for patient and close contacts and RBA of receiving in pregnancy, advised obtaining in third trimester   [] Reviewed healthy nutrition in pregnancy and good exercise practices   [] We disc safer medications in pregnancy and referred patient to [facility] resources   [] We reviewed CDC recommendations for flu vaccine and RBA of receiving in pregnancy   []    []    []       Follow-up and Dispositions    · Return in about 2 weeks (around 3/24/2021) for Follow up OB visit.          Orders Placed This Encounter    Tetanus, diphtheria toxoids and acellular pertussis (TDAP) vaccine, in individuals >=7 years, IM    GLUCOSE, GESTATIONAL 1 HR TOLERANCE    CBC W/O DIFF    [facility] Olp ADMIN,1 SINGLE/COMB VAC/TOXOID       Selma Camara MD

## 2021-03-11 LAB
ERYTHROCYTE [DISTWIDTH] IN BLOOD BY AUTOMATED COUNT: 14 % (ref 11.5–14.5)
GLUCOSE 1H P 100 G GLC PO SERPL-MCNC: 124 MG/DL (ref 65–140)
HCT VFR BLD AUTO: 31.7 % (ref 35–47)
HGB BLD-MCNC: 10.4 G/DL (ref 11.5–16)
MCH RBC QN AUTO: 30.8 PG (ref 26–34)
MCHC RBC AUTO-ENTMCNC: 32.8 G/DL (ref 30–36.5)
MCV RBC AUTO: 93.8 FL (ref 80–99)
NRBC # BLD: 0 K/UL (ref 0–0.01)
NRBC BLD-RTO: 0 PER 100 WBC
PLATELET # BLD AUTO: 168 K/UL (ref 150–400)
PMV BLD AUTO: 12.4 FL (ref 8.9–12.9)
RBC # BLD AUTO: 3.38 M/UL (ref 3.8–5.2)
WBC # BLD AUTO: 11.3 K/UL (ref 3.6–11)

## 2021-03-18 ENCOUNTER — OFFICE VISIT (OUTPATIENT)
Dept: CARDIOLOGY CLINIC | Age: 31
End: 2021-03-18
Payer: COMMERCIAL

## 2021-03-18 VITALS
DIASTOLIC BLOOD PRESSURE: 78 MMHG | HEIGHT: 64 IN | WEIGHT: 200 LBS | HEART RATE: 94 BPM | OXYGEN SATURATION: 100 % | BODY MASS INDEX: 34.15 KG/M2 | SYSTOLIC BLOOD PRESSURE: 130 MMHG

## 2021-03-18 DIAGNOSIS — O10.019 PRE-EXISTING ESSENTIAL HYPERTENSION DURING PREGNANCY, ANTEPARTUM: Primary | ICD-10-CM

## 2021-03-18 PROCEDURE — 93000 ELECTROCARDIOGRAM COMPLETE: CPT | Performed by: INTERNAL MEDICINE

## 2021-03-18 PROCEDURE — 99243 OFF/OP CNSLTJ NEW/EST LOW 30: CPT | Performed by: INTERNAL MEDICINE

## 2021-03-18 NOTE — PROGRESS NOTES
Chief Complaint   Patient presents with    Hypertension     Visit Vitals  /78 (BP 1 Location: Left upper arm, BP Patient Position: Sitting)   Pulse 94   Ht 5' 4\" (1.626 m)   Wt 200 lb (90.7 kg)   SpO2 100%   BMI 34.33 kg/m²     Chest pain denied   SOB denied  Dizziness denied  Swelling in hands/feet denied   Recent hospital stays denied

## 2021-03-18 NOTE — PROGRESS NOTES
Reason for Consult: Ectopies    Referring: Augie Garcia MD      HPI: Johan Nicholas is a 27 y.o. female with past medical history significant for hypertension, ectopies, now pregnant with 6 months is here for evaluation as a precautionary visit for prior histories of ectopies. Currently does not have any symptoms of palpitations, chest pain, lightheadedness or dizziness. Since she became pregnant she has been changed over to labetalol. She does not recall her previous medication for blood pressure. Her blood pressure during the pregnancy has been controlled. She is also taking aspirin to prevent preeclampsia. She is scheduled to be induced earlier than her expected date of delivery to avoid preeclampsia. She does not smoke tobacco or drink alcohol. EKG in my office today demonstrated normal sinus rhythm, normal axis, normal intervals, normal ST segment. Plan:    1. History of ectopic beats: This was diagnosed when she was 24years of age. She never had any symptoms. No further treatment or testing was needed. Since then she has not seen a cardiologist.  Currently does not have any symptoms. EKG today does not demonstrate any ectopic beats. At this time there is no further evaluation needed. 2.  History of hypertension: Currently on labetalol 100 mg twice daily and blood pressure is controlled. Also on baby aspirin to be prevent preeclampsia. Continue these medications and titrate if needed during the pregnancy. 3.  Induction of labor: She is safe to proceed with induction of labor per the schedule of OB/GYN. 4.  No further follow-up is needed. ATTENTION:   This medical record was transcribed using an electronic medical records/speech recognition system. Although proofread, it may and can contain electronic, spelling and other errors. Corrections may be executed at a later time. Please feel free to contact us for any clarifications as needed.       Past Medical History: Diagnosis Date    ADD (attention deficit disorder)     Breast lump in female     Endorses provider felt left breast lump in 2017, pt denies ever feeling breast lump at anytime    Depression     Diabetes ([facility] [address]     pre-diabetes    Heart abnormality     states her heart skips every 15 beats    Hypertension     Pap smear for cervical cancer screening 07/19/2019    Negative, HPV negative    Ulcerative colitis ([facility] [address]             Past Surgical History:   Procedure Laterality Date    COLONOSCOPY,DIAGNOSTIC  1/6/2016         HX ADENOIDECTOMY      age 2 or 1    HX TYMPANOSTOMY      had tubes placed twice during childhood    HX WISDOM TEETH EXTRACTION  2006             Family History   Problem Relation Age of Onset    Hypertension Father     Diabetes Father     Psychiatric Disorder Father     Psychiatric Disorder Mother     Diabetes Mother     Hypertension Mother     High Cholesterol Mother     Cancer Maternal Grandmother         lung    Psychiatric Disorder Maternal Grandmother     Heart Disease Maternal Grandmother     Diabetes Paternal Grandfather     Heart Disease Paternal Grandfather    Kossuth Hitch Syndrome Maternal Uncle     Downs Syndrome Cousin            Social History     Socioeconomic History    Marital status:      Spouse name: Not on file    Number of children: Not on file    Years of education: Not on file    Highest education level: Not on file   Occupational History    Not on file   Social Needs    Financial resource strain: Not on file    Food insecurity     Worry: Not on file     Inability: Not on file    Transportation needs     Medical: Not on file     Non-medical: Not on file   Tobacco Use    Smoking status: Never Smoker    Smokeless tobacco: Never Used   Substance and Sexual Activity    Alcohol use: Not Currently     Alcohol/week: 0.8 standard drinks     Types: 1 Shots of liquor per week     Comment: occasional    Drug use: No    Sexual activity: Yes     Partners: Male   Lifestyle   • Physical activity     Days per week: Not on file     Minutes per session: Not on file   • Stress: Not on file   Relationships   • Social connections     Talks on phone: Not on file     Gets together: Not on file     Attends Congregational service: Not on file     Active member of club or organization: Not on file     Attends meetings of clubs or organizations: Not on file     Relationship status: Not on file   • Intimate partner violence     Fear of current or ex partner: Not on file     Emotionally abused: Not on file     Physically abused: Not on file     Forced sexual activity: Not on file   Other Topics Concern   •  Service Not Asked   • Blood Transfusions Not Asked   • Caffeine Concern Not Asked   • Occupational Exposure Not Asked   • Hobby Hazards Not Asked   • Sleep Concern Not Asked   • Stress Concern Not Asked   • Weight Concern Not Asked   • Special Diet Not Asked   • Back Care Not Asked   • Exercise Not Asked   • Bike Helmet Not Asked   • Seat Belt Not Asked   • Self-Exams Not Asked   Social History Narrative   • Not on file         Allergies   Allergen Reactions   • Augmentin [Amoxicillin-Pot Clavulanate] Other (comments)     Diarrhea, vomiting, heartburn and nausea. Tolerated zpak/azithromycin/clindamycin   • Penicillins Rash   • Sulfa (Sulfonamide Antibiotics) Rash     Elevated HR            Current Outpatient Medications   Medication Sig Dispense Refill   • BABY ASPIRIN PO Take  by mouth.     • labetaloL (NORMODYNE) 100 mg tablet TAKE 1 TABLET BY MOUTH TWICE A DAY 60 Tab 3   • buPROPion XL (WELLBUTRIN XL) 150 mg tablet Take 1 Tab by mouth every morning. 30 Tab 1   • prenatal vit-iron fumarate-fa 27 mg iron- 0.8 mg tab tablet Take 1 Tab by mouth daily.          ROS:  12 point review of systems was performed. All negative except for HPI     Physical Exam:  Visit Vitals  /78 (BP 1 Location: Left upper arm, BP Patient Position: Sitting)   Pulse 94   Ht 5' 4\" (1.626 m)   Wt  200 lb (90.7 kg)   LMP 09/01/2020   SpO2 100%   BMI 34.33 kg/m²       Gen:  Well-developed, well-nourished, in no acute distress  HEENT:  Pink conjunctivae, PERRL, hearing intact to voice, moist mucous membranes  Neck:  Supple, without masses, thyroid non-tender  Resp:  No accessory muscle use, clear breath sounds without wheezes rales or rhonchi  Card:  No murmurs, normal S1, S2 without thrills, bruits or peripheral edema  Abd:  Soft, non-tender, non-distended, normoactive bowel sounds are present, no palpable organomegaly and no detectable hernias  Lymph:  No cervical or inguinal adenopathy  Musc:  No cyanosis or clubbing  Skin:  No rashes or ulcers, skin turgor is good  Neuro:  Cranial nerves are grossly intact, no focal motor weakness, follows commands appropriately  Psych:  Good insight, oriented to person, place and time, alert     Labs:     Lab Results   Component Value Date/Time    WBC 11.3 (H) 03/10/2021 10:53 AM    HGB 10.4 (L) 03/10/2021 10:53 AM    HCT 31.7 (L) 03/10/2021 10:53 AM    PLATELET 791 86/77/8649 10:53 AM    MCV 93.8 03/10/2021 10:53 AM    Hgb, External 11.2 11/06/2020    Hct, External 33.9 11/06/2020    Platelet cnt., External 230 11/06/2020     Lab Results   Component Value Date/Time    Hemoglobin A1c 5.9 (H) 06/02/2016 09:55 AM    Hemoglobin A1c 5.6 11/03/2015 11:10 AM    Hemoglobin A1c 5.9 (H) 12/11/2014 08:53 AM    Glucose 89 12/02/2020 12:00 AM    LDL, calculated 127 (H) 06/02/2016 09:55 AM    Creatinine 0.51 (L) 12/02/2020 12:00 AM      Lab Results   Component Value Date/Time    Cholesterol, total 225 (H) 06/02/2016 09:55 AM    HDL Cholesterol 53 06/02/2016 09:55 AM    LDL, calculated 127 (H) 06/02/2016 09:55 AM    Triglyceride 223 (H) 06/02/2016 09:55 AM     Lab Results   Component Value Date/Time    ALT (SGPT) 14 12/02/2020 12:00 AM    Alk.  phosphatase 50 12/02/2020 12:00 AM    Bilirubin, total <0.2 12/02/2020 12:00 AM    Albumin 4.1 12/02/2020 12:00 AM    Protein, total 6.1 12/02/2020 12:00 AM    PLATELET 692 18/12/6490 10:53 AM    Platelet cnt., External 230 11/06/2020     No results found for: INR, INREXT, PTMR, PTP, PT1, PT2, INREXT   Lab Results   Component Value Date/Time    GFR est non- 12/02/2020 12:00 AM    GFR est  12/02/2020 12:00 AM    Creatinine 0.51 (L) 12/02/2020 12:00 AM    BUN 10 12/02/2020 12:00 AM    Sodium 137 12/02/2020 12:00 AM    Potassium 4.4 12/02/2020 12:00 AM    Chloride 104 12/02/2020 12:00 AM    CO2 21 12/02/2020 12:00 AM    Magnesium 2.0 01/07/2016 03:36 AM     No results found for: PSA, PSA2, Loyce Favian, [id number], [id number]  Lab Results   Component Value Date/Time    TSH 1.490 06/02/2016 09:55 AM      Lab Results   Component Value Date/Time    Glucose 89 12/02/2020 12:00 AM      No results found for: CPK, RCK1, RCK2, RCK3, RCK4, CKMB, CKNDX, CKND1, TROPT, TROIQ, BNPP, BNP   No results found for: BNP, BNPP, BNPPPOC, XBNPT, BNPNT   Lab Results   Component Value Date/Time    Sodium 137 12/02/2020 12:00 AM    Potassium 4.4 12/02/2020 12:00 AM    Chloride 104 12/02/2020 12:00 AM    CO2 21 12/02/2020 12:00 AM    Anion gap 6 01/07/2016 03:36 AM    Glucose 89 12/02/2020 12:00 AM    BUN 10 12/02/2020 12:00 AM    Creatinine 0.51 (L) 12/02/2020 12:00 AM    BUN/Creatinine ratio 20 12/02/2020 12:00 AM    GFR est  12/02/2020 12:00 AM    GFR est non- 12/02/2020 12:00 AM    Calcium 8.7 12/02/2020 12:00 AM      Lab Results   Component Value Date/Time    Sodium 137 12/02/2020 12:00 AM    Potassium 4.4 12/02/2020 12:00 AM    Chloride 104 12/02/2020 12:00 AM    CO2 21 12/02/2020 12:00 AM    Anion gap 6 01/07/2016 03:36 AM    Glucose 89 12/02/2020 12:00 AM    BUN 10 12/02/2020 12:00 AM    Creatinine 0.51 (L) 12/02/2020 12:00 AM    BUN/Creatinine ratio 20 12/02/2020 12:00 AM    GFR est  12/02/2020 12:00 AM    GFR est non- 12/02/2020 12:00 AM    Calcium 8.7 12/02/2020 12:00 AM    Bilirubin, total <0.2 12/02/2020 12:00 AM ALT (SGPT) 14 12/02/2020 12:00 AM    Alk. phosphatase 50 12/02/2020 12:00 AM    Protein, total 6.1 12/02/2020 12:00 AM    Albumin 4.1 12/02/2020 12:00 AM    Globulin 3.4 01/06/2016 05:17 AM    A-G Ratio 2.1 12/02/2020 12:00 AM      Lab Results   Component Value Date/Time    Hemoglobin A1c 5.9 (H) 06/02/2016 09:55 AM         No results for input(s): CPK, CKMB, TROIQ in the last 72 hours. No lab exists for component: CKQMB, CPKMB        Problem List:     Problem List  Date Reviewed: 3/10/2021          Codes Class Noted    Pre-existing essential hypertension during pregnancy, antepartum ICD-10-CM: O10.019  ICD-9-CM: 642.03  12/17/2020        Prenatal care of primigravida, antepartum ICD-10-CM: Z34.00  ICD-9-CM: V22.0  11/6/2020    Overview Addendum 3/17/2021  8:50 AM by Jeffry Castro LPN     CHTN, FS; UR DIP EACH VISIT  NIPS/carrier combo:Panorama low risk 12/3/20  Add CMP to labs  Baby asa  Wellbutrin/ADD: FS  Fh cardiac issues: irregular heart rate: maternal, murmur: father  FH DS  Early glucola at 16 wk - wnl; repeat 26-28w   AST: 13, ALT: 14, CR: 0.51- 12/03/20  MFM: Follow up detailed anatomy scan in 8 weeks. Careful cardiac views else referral to  pediatric cardiology for fetal echo due to 's VSD. 20wk scan scheduled with [facility] 01/27/2021 @11am   Maternal cards referral 1/27/2021 maternal EKG and echocardiogram, teen onset murmur  Fetal echo referral by [facility] 1/27/2021  24 hr urine given 1/27/2021 ---> protein urine, 24 hr calc 126 2/19/21  FS: marginal PCI, XX, ant plac, fu six weeks  IOL 5/26/21 (poss patel if not 5am). Mychart sent. Fetal echo wnl  Maternal cards fu 3/18?  27w1d plac wnl [facility]  3/10: Hbg-10.4             Sepsis ([address] ICD-10-CM: A41.9  ICD-9-CM: 038.9, 995.91  1/4/2016        ADD (attention deficit disorder) ICD-10-CM: F98.8  ICD-9-CM: 314.00  11/21/2014        Depression ICD-10-CM: F32.9  ICD-9-CM: 561  11/21/2014                Regan Butler MD, [facility] [place]

## 2021-03-24 ENCOUNTER — TELEPHONE (OUTPATIENT)
Dept: OBGYN CLINIC | Age: 31
End: 2021-03-24

## 2021-03-24 RX ORDER — LABETALOL 100 MG/1
TABLET, FILM COATED ORAL
Qty: 60 TAB | Refills: 0 | Status: SHIPPED | OUTPATIENT
Start: 2021-03-24 | End: 2021-04-12 | Stop reason: SDUPTHER

## 2021-03-24 NOTE — TELEPHONE ENCOUNTER
Call received at 2:40PM      27year old patient last seen in the office on 3/10/2021  G1 29w0d pregnant      Patient calling about her prescription for      labetaloL (NORMODYNE) 100 mg tablet    Patient reports she was transferring the prescription to her new pharmacy and they said she needed to contact the office    Prescription sent as per MD order to patient confirmed pharmacy      Patient verbalized understanding.

## 2021-04-07 ENCOUNTER — HOSPITAL ENCOUNTER (OUTPATIENT)
Dept: PERINATAL CARE | Age: 31
Discharge: HOME | End: 2021-04-07
Attending: OBSTETRICS & GYNECOLOGY
Payer: COMMERCIAL

## 2021-04-07 PROCEDURE — 76816 OB US FOLLOW-UP PER FETUS: CPT | Performed by: OBSTETRICS & GYNECOLOGY

## 2021-04-09 NOTE — PATIENT INSTRUCTIONS
Weeks 30 to 32 of Your Pregnancy: Care Instructions  Overview     You've made it to the final months of your pregnancy! By now your baby is really starting to look like a baby, with hair and plump skin. As you enter the final weeks of pregnancy, the reality of having a baby may start to set in. This is a good time to set up a safe nursery and find quality  if needed. Doing this stuff ahead of time will allow you to focus on caring for and enjoying your new baby. You may also want to take a tour of your hospital's labor and delivery unit. This will help you get a better idea of what to expect while you're in the hospital.  During these last months, be sure to take good care of yourself. Pay attention to what your body needs. If your doctor says it's okay for you to work, don't push yourself too hard. If you haven't already had the Tdap shot during this pregnancy, talk to your doctor about getting it. It will help protect your  against pertussis infection. Follow-up care is a key part of your treatment and safety. Be sure to make and go to all appointments, and call your doctor if you are having problems. It's also a good idea to know your test results and keep a list of the medicines you take. How can you care for yourself at home? Pay attention to your baby's movements  · You should feel your baby move several times every day. · Your baby now turns less, and kicks and jabs more. · Your baby sleeps 20 to 45 minutes at a time and is more active at certain times of day. · If your doctor wants you to count your baby's kicks:  ? Empty your bladder, and lie on your side or relax in a comfortable chair. ? Write down your start time. ? Pay attention only to your baby's movements. Count any movement except hiccups. ? After you have counted 10 movements, write down your stop time. ? Write down how many minutes it took for your baby to move 10 times.   ? If an hour goes by and you have not recorded 10 movements, have something to eat or drink and then count for another hour. If you do not record 10 movements in either hour, call your doctor. Ease heartburn  · Eat small, frequent meals. · Do not eat chocolate, peppermint, or very spicy foods. Avoid drinks with caffeine, such as coffee, tea, and sodas. · Avoid bending over or lying down after meals. · Talk a short walk after you eat. · If heartburn is a problem at night, do not eat for 2 hours before bedtime. · Take antacids like Mylanta, Maalox, Rolaids, or Tums. Do not take antacids that have sodium bicarbonate. Care for varicose veins  · Varicose veins are blood vessels that stretch out with the extra blood during pregnancy. Your legs may ache or throb. Most varicose veins will go away after the birth. · Avoid standing for long periods of time. Sit with your legs crossed at the ankles, not the knees. · Sit with your feet propped up. · Avoid tight clothing or stockings. Wear support hose. · Exercise regularly. Try walking for at least 30 minutes a day. Where can you learn more? Go to http://www.gray.com/  Enter X471 in the search box to learn more about \"Weeks 30 to 32 of Your Pregnancy: Care Instructions. \"  Current as of: October 8, 2020               Content Version: 12.8  © 7215-8685 [employer]. Care instructions adapted under license by [employer] (which disclaims liability or warranty for this information). If you have questions about a medical condition or this instruction, always ask your healthcare professional. [place] [address] any warranty or liability for your use of this information.

## 2021-04-12 ENCOUNTER — ROUTINE PRENATAL (OUTPATIENT)
Dept: OBGYN CLINIC | Age: 31
End: 2021-04-12
Payer: COMMERCIAL

## 2021-04-12 VITALS
HEART RATE: 98 BPM | BODY MASS INDEX: 34.89 KG/M2 | SYSTOLIC BLOOD PRESSURE: 132 MMHG | HEIGHT: 64 IN | DIASTOLIC BLOOD PRESSURE: 85 MMHG | WEIGHT: 204.4 LBS

## 2021-04-12 DIAGNOSIS — Z34.00 PRENATAL CARE OF PRIMIGRAVIDA, ANTEPARTUM: ICD-10-CM

## 2021-04-12 DIAGNOSIS — I10 CHRONIC HYPERTENSION: Primary | ICD-10-CM

## 2021-04-12 PROCEDURE — 0502F SUBSEQUENT PRENATAL CARE: CPT | Performed by: OBSTETRICS & GYNECOLOGY

## 2021-04-12 RX ORDER — LABETALOL 100 MG/1
TABLET, FILM COATED ORAL
Qty: 60 TAB | Refills: 3 | Status: SHIPPED | OUTPATIENT
Start: 2021-04-12 | End: 2021-05-21

## 2021-04-12 NOTE — PROGRESS NOTES
_ [facility]  [email or web address]  [phone]    Luigi Corcoran MD, FACOG     Follow-up OB visit    Chief Complaint   Patient presents with    Routine Prenatal Visit       Patient Active Problem List    Diagnosis Date Noted    Pre-existing essential hypertension during pregnancy, antepartum 12/17/2020    Prenatal care of primigravida, antepartum 11/06/2020    Sepsis ([address] 01/04/2016    ADD (attention deficit disorder) 11/21/2014    Depression 11/21/2014          The patient reports the following concerns: Needs RF on Labetelol     Vitals:    04/12/21 1403   BP: 132/85   Pulse: 98   Weight: 204 lb 6.4 oz (92.7 kg)   Height: 5' 4\" (1.626 m)     See PN flowsheet for exam    27 y.o. [address]   Encounter Diagnoses   Name Primary?     Chronic hypertension Yes    Prenatal care of primigravida, antepartum        PIH precautions  Keep mfm fu   [] SAB/bleeding precautions reviewed   [x] PTL/PPROM precautions reviewed   [] Labor precautions reviewed   [] Fetal kick counts discussed   [] Labs reviewed with patient   [] Melani Bussing precautions reviewed   [] Consent reviewed   [] Handouts given to pt   [] Glucola handout    [] GBS/labor/Magic Hour handout   []    [] We reviewed CDC recommendations for Tdap for patient and close contacts and RBA of receiving in pregnancy, advised obtaining in third trimester   [] Reviewed healthy nutrition in pregnancy and good exercise practices   [] We disc safer medications in pregnancy and referred patient to [facility] resources   [] We reviewed CDC recommendations for flu vaccine and RBA of receiving in pregnancy   []    []    []           Orders Placed This Encounter    labetaloL (NORMODYNE) 100 mg tablet       Luigi Corcoran MD

## 2021-04-15 ENCOUNTER — ROUTINE PRENATAL (OUTPATIENT)
Dept: OBGYN CLINIC | Age: 31
End: 2021-04-15
Payer: COMMERCIAL

## 2021-04-15 ENCOUNTER — HOSPITAL ENCOUNTER (OUTPATIENT)
Age: 31
Setting detail: OBSERVATION
Discharge: HOME | End: 2021-04-16
Attending: OBSTETRICS & GYNECOLOGY | Admitting: OBSTETRICS & GYNECOLOGY
Payer: COMMERCIAL

## 2021-04-15 ENCOUNTER — TELEPHONE (OUTPATIENT)
Dept: OBGYN CLINIC | Age: 31
End: 2021-04-15

## 2021-04-15 VITALS
HEIGHT: 64 IN | DIASTOLIC BLOOD PRESSURE: 93 MMHG | SYSTOLIC BLOOD PRESSURE: 155 MMHG | BODY MASS INDEX: 35.65 KG/M2 | HEART RATE: 76 BPM | WEIGHT: 208.8 LBS

## 2021-04-15 DIAGNOSIS — O10.913 PRE-EXISTING HYPERTENSION DURING PREGNANCY IN THIRD TRIMESTER, UNSPECIFIED PRE-EXISTING HYPERTENSION TYPE: ICD-10-CM

## 2021-04-15 DIAGNOSIS — O10.919 HTN IN PREGNANCY, CHRONIC: ICD-10-CM

## 2021-04-15 DIAGNOSIS — R03.0 ELEVATED BLOOD PRESSURE READING: ICD-10-CM

## 2021-04-15 DIAGNOSIS — Z34.00 PRENATAL CARE OF PRIMIGRAVIDA, ANTEPARTUM: Primary | ICD-10-CM

## 2021-04-15 PROBLEM — O16.9 HYPERTENSION IN PREGNANCY: Status: ACTIVE | Noted: 2021-04-15

## 2021-04-15 LAB
ALBUMIN SERPL-MCNC: 2.7 G/DL (ref 3.5–5)
ALBUMIN/GLOB SERPL: 0.9 {RATIO} (ref 1.1–2.2)
ALP SERPL-CCNC: 141 U/L (ref 45–117)
ALT SERPL-CCNC: 22 U/L (ref 12–78)
ANION GAP SERPL CALC-SCNC: 8 MMOL/L (ref 5–15)
AST SERPL-CCNC: 14 U/L (ref 15–37)
BILIRUB SERPL-MCNC: 0.3 MG/DL (ref 0.2–1)
BUN SERPL-MCNC: 11 MG/DL (ref 6–20)
BUN/CREAT SERPL: 28 (ref 12–20)
CALCIUM SERPL-MCNC: 8.8 MG/DL (ref 8.5–10.1)
CHLORIDE SERPL-SCNC: 108 MMOL/L (ref 97–108)
CO2 SERPL-SCNC: 22 MMOL/L (ref 21–32)
CREAT SERPL-MCNC: 0.39 MG/DL (ref 0.55–1.02)
CREAT UR-MCNC: 26 MG/DL
ERYTHROCYTE [DISTWIDTH] IN BLOOD BY AUTOMATED COUNT: 13.2 % (ref 11.5–14.5)
GLOBULIN SER CALC-MCNC: 2.9 G/DL (ref 2–4)
GLUCOSE SERPL-MCNC: 94 MG/DL (ref 65–100)
HCT VFR BLD AUTO: 32.2 % (ref 35–47)
HGB BLD-MCNC: 10.4 G/DL (ref 11.5–16)
MCH RBC QN AUTO: 29.4 PG (ref 26–34)
MCHC RBC AUTO-ENTMCNC: 32.3 G/DL (ref 30–36.5)
MCV RBC AUTO: 91 FL (ref 80–99)
NRBC # BLD: 0 K/UL (ref 0–0.01)
NRBC BLD-RTO: 0 PER 100 WBC
PLATELET # BLD AUTO: 170 K/UL (ref 150–400)
PMV BLD AUTO: 12.8 FL (ref 8.9–12.9)
POTASSIUM SERPL-SCNC: 4 MMOL/L (ref 3.5–5.1)
PROT SERPL-MCNC: 5.6 G/DL (ref 6.4–8.2)
PROT UR-MCNC: 6 MG/DL (ref 0–11.9)
PROT/CREAT UR-RTO: 0.2
RBC # BLD AUTO: 3.54 M/UL (ref 3.8–5.2)
SODIUM SERPL-SCNC: 138 MMOL/L (ref 136–145)
WBC # BLD AUTO: 11.2 K/UL (ref 3.6–11)

## 2021-04-15 PROCEDURE — 81050 URINALYSIS VOLUME MEASURE: CPT

## 2021-04-15 PROCEDURE — 99218 HC RM OBSERVATION: CPT

## 2021-04-15 PROCEDURE — 82570 ASSAY OF URINE CREATININE: CPT

## 2021-04-15 PROCEDURE — 74011250637 HC RX REV CODE- 250/637: Performed by: OBSTETRICS & GYNECOLOGY

## 2021-04-15 PROCEDURE — 36415 COLL VENOUS BLD VENIPUNCTURE: CPT

## 2021-04-15 PROCEDURE — 85027 COMPLETE CBC AUTOMATED: CPT

## 2021-04-15 PROCEDURE — 0502F SUBSEQUENT PRENATAL CARE: CPT | Performed by: OBSTETRICS & GYNECOLOGY

## 2021-04-15 PROCEDURE — 80053 COMPREHEN METABOLIC PANEL: CPT

## 2021-04-15 PROCEDURE — 59025 FETAL NON-STRESS TEST: CPT

## 2021-04-15 PROCEDURE — 99284 EMERGENCY DEPT VISIT MOD MDM: CPT

## 2021-04-15 RX ORDER — LABETALOL HCL 20 MG/4 ML
40 SYRINGE (ML) INTRAVENOUS
Status: ACTIVE | OUTPATIENT
Start: 2021-04-15 | End: 2021-04-16

## 2021-04-15 RX ORDER — FAMOTIDINE 20 MG/1
20 TABLET, FILM COATED ORAL
Status: DISCONTINUED | OUTPATIENT
Start: 2021-04-15 | End: 2021-04-16 | Stop reason: HOSPADM

## 2021-04-15 RX ORDER — LABETALOL 100 MG/1
100 TABLET, FILM COATED ORAL 2 TIMES DAILY
Status: DISCONTINUED | OUTPATIENT
Start: 2021-04-15 | End: 2021-04-16 | Stop reason: HOSPADM

## 2021-04-15 RX ORDER — DIPHENHYDRAMINE HCL 25 MG
50 CAPSULE ORAL
Status: DISCONTINUED | OUTPATIENT
Start: 2021-04-15 | End: 2021-04-16 | Stop reason: HOSPADM

## 2021-04-15 RX ORDER — LABETALOL HCL 20 MG/4 ML
80 SYRINGE (ML) INTRAVENOUS
Status: ACTIVE | OUTPATIENT
Start: 2021-04-15 | End: 2021-04-16

## 2021-04-15 RX ORDER — BUPROPION HYDROCHLORIDE 150 MG/1
150 TABLET ORAL DAILY
Status: DISCONTINUED | OUTPATIENT
Start: 2021-04-16 | End: 2021-04-16 | Stop reason: HOSPADM

## 2021-04-15 RX ORDER — SWAB
1 SWAB, NON-MEDICATED MISCELLANEOUS DAILY
Status: DISCONTINUED | OUTPATIENT
Start: 2021-04-16 | End: 2021-04-16 | Stop reason: HOSPADM

## 2021-04-15 RX ORDER — ASPIRIN 81 MG/1
81 TABLET ORAL DAILY
Status: DISCONTINUED | OUTPATIENT
Start: 2021-04-16 | End: 2021-04-16 | Stop reason: HOSPADM

## 2021-04-15 RX ORDER — LABETALOL HYDROCHLORIDE 5 MG/ML
20 INJECTION, SOLUTION INTRAVENOUS
Status: DISCONTINUED | OUTPATIENT
Start: 2021-04-15 | End: 2021-04-15

## 2021-04-15 RX ORDER — HYDRALAZINE HYDROCHLORIDE 20 MG/ML
10 INJECTION INTRAMUSCULAR; INTRAVENOUS
Status: ACTIVE | OUTPATIENT
Start: 2021-04-15 | End: 2021-04-16

## 2021-04-15 RX ORDER — LABETALOL HCL 20 MG/4 ML
20 SYRINGE (ML) INTRAVENOUS
Status: ACTIVE | OUTPATIENT
Start: 2021-04-15 | End: 2021-04-16

## 2021-04-15 RX ORDER — ACETAMINOPHEN 325 MG/1
650 TABLET ORAL
Status: DISCONTINUED | OUTPATIENT
Start: 2021-04-15 | End: 2021-04-16 | Stop reason: HOSPADM

## 2021-04-15 RX ADMIN — LABETALOL HYDROCHLORIDE 100 MG: 100 TABLET, FILM COATED ORAL at 18:50

## 2021-04-15 RX ADMIN — ACETAMINOPHEN 650 MG: 325 TABLET ORAL at 18:56

## 2021-04-15 NOTE — PROGRESS NOTES
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 Careful cardiac views else referral to  pediatric cardiology for fetal echo due to 's VSD. 20wk scan scheduled with [address] [place] [address] 2021 @11am   Maternal cards referral 2021 maternal EKG and echocardiogram, teen onset murmur  Fetal echo referral by [facility] 2021  24 hr urine given 2021 ---> protein urine, 24 hr calc 126 21  FS: marginal PCI, XX, ant plac, fu six weeks  IOL 21 (poss patel if not 5am). Mychart sent. Fetal echo wnl  Maternal cards fu 3/18?  27w1d plac wnl [facility]  3/10: Hbg-10.4      Sepsis ([address]    ADD (attention deficit disorder)    Depression       History of Present Illness: The patient is a 27 y.o.  female who reports having tingling on her right arm yesterday; she has had this prior to pregnancy as well. At home her BP was 190/109; then 170/109; 146/96. At work it was 155/96; 158/96. Last night about 9:35pm her BP was 179/109. She has not missed any BP medications. This morning her BP was Today her BP was 160/86. Her pulse has been around 65-80. C/o RT side hand swelling. Pt reports she has a muscle pain behind her thigh on the left side. C/o sharp pain on her LT side pelvic area. This is not a new problem.   This is not a routinely scheduled OB appointment. She reports the symptoms are has worsened. Aggravating factors include; stress (her mother). Alleviating factors include: laying down; eating. She does not have other concerns. [address]:  Past Medical History:   Diagnosis Date    ADD (attention deficit disorder)     Breast lump in female     Endorses provider felt left breast lump in 2017, pt denies ever feeling breast lump at anytime    Depression     Diabetes ([facility]     pre-diabetes    Heart abnormality     states her heart skips every 15 beats    Hypertension     Pap smear for cervical cancer screening 07/19/2019    Negative, HPV negative    Ulcerative colitis ([facility]      Past Surgical History:   Procedure Laterality Date    COLONOSCOPY,DIAGNOSTIC  1/6/2016         HX ADENOIDECTOMY      age 2 or 1    HX TYMPANOSTOMY      had tubes placed twice during childhood    HX WISDOM TEETH EXTRACTION  2006     Family History   Problem Relation Age of Onset    Hypertension Father     Diabetes Father     Psychiatric Disorder Father     Psychiatric Disorder Mother     Diabetes Mother     Hypertension Mother     High Cholesterol Mother     Cancer Maternal Grandmother         lung    Psychiatric Disorder Maternal Grandmother     Heart Disease Maternal Grandmother     Diabetes Paternal Grandfather     Heart Disease Paternal Grandfather    Veria Aniket Syndrome Maternal Uncle     Downs Syndrome Cousin      Social History     Tobacco Use    Smoking status: Never Smoker    Smokeless tobacco: Never Used   Substance Use Topics    Alcohol use: Not Currently     Alcohol/week: 0.8 standard drinks     Types: 1 Shots of liquor per week     Comment: occasional    Drug use: No     Allergies   Allergen Reactions    Augmentin [Amoxicillin-Pot Clavulanate] Other (comments)     Diarrhea, vomiting, heartburn and nausea.  Tolerated zpak/azithromycin/clindamycin    Penicillins Rash    Sulfa (Sulfonamide Antibiotics) Rash Elevated HR     No current facility-administered medications for this visit. No current outpatient medications on file.      Facility-Administered Medications Ordered in Other Visits   Medication Dose Route Frequency    labetaloL (NORMODYNE;TRANDATE) 20 mg/4 mL (5 mg/mL) injection 40 mg  40 mg IntraVENous ONCE PRN    labetaloL (NORMODYNE;TRANDATE) 20 mg/4 mL (5 mg/mL) injection 80 mg  80 mg IntraVENous ONCE PRN    hydrALAZINE (APRESOLINE) 20 mg/mL injection 10 mg  10 mg IntraVENous ONCE PRN    labetaloL (NORMODYNE;TRANDATE) 20 mg/4 mL (5 mg/mL) injection 20 mg  20 mg IntraVENous ONCE PRN       Review of Systems:  History obtained from the patient and written ROS questionnaire  Constitutional:mild HA,   ENT ROS: negative for - hearing change, oral lesions or visual changes  Respiratory: negative for cough, wheezing or dyspnea on exertion  Cardiovascular: negative for chest pain, irregular heart beats, exertional chest pressure/discomfort  Gastrointestinal: negative for dysphagia, nausea and vomiting  Genito-Urinary ROS: , see HPI  Inteument/breast: negative for rash, breast lump and nipple discharge  Musculoskeletal:negative for stiff joints, neck pain and muscle weakness  Endocrine ROS: negative for - breast changes, galactorrhea or temperature intolerance  Hematological and Lymphatic ROS: negative for - blood clots, bruising or swollen lymph nodes    Physical Exam:  Visit Vitals  BP (!) 155/93 (BP 1 Location: Right arm, BP Patient Position: Sitting, BP Cuff Size: Adult)   Pulse 76   Ht 5' 4\" (1.626 m)   Wt 208 lb 12.8 oz (94.7 kg)   BMI 35.84 kg/m²       GENERAL: alert, well appearing, and in no distress  HEAD; normocephalic, atraumatic  PULM: clear to auscultation, no wheezes, rales or rhonchi, symmetric air entry   COR: normal rate and regular rhythm, S1 and S2 normal   ABDOMEN: soft, nontender, nondistended, no masses or organomegaly   BACK: normal range of motion, no tenderness, no CVAT   NEURO: alert, oriented, normal speech    See PN flowsheet for additional notes and exam    Assessment:  27 y.o. [address]   Encounter Diagnoses   Name Primary?  Prenatal care of primigravida, antepartum Yes    HTN in pregnancy, chronic     Elevated blood pressure reading        Plan:  An evaluation of this patient's concern is planned. The patient is advised that she should contact the office if she does not note improvement or if symptoms recur  She should contact our office with any questions or concerns  She could keep her routine OB appointment. To L and D, pih labs, serial BP    No orders of the defined types were placed in this encounter. No results found for this visit on 04/15/21.     Lázaro Leonardo MD

## 2021-04-15 NOTE — TELEPHONE ENCOUNTER
Call received at 640am    27year old patient last seen in the office on 2021    Patient  28 w2d pregnant    Patient denies vaginal bleeding,ROM, contractions and reports positive fetal movement      Patient reports tingling from the elbow to her finger tips ( had prior to becoming pregnant) and swelling of the right arm. Patient denies redness or streaks to the right arm    Patient reports she is taking BP medicine labetaloL (NORMODYNE) 100 mg tablet   Patient is taking the medication regularly and not missing a pill    Patient report BP last night around 7p 190/?, then at 10:30am 146/109      This am 146/56 and just before calling at work 158/99     Patient denies blurred vision and reports off and on headache    Patient reports abdominal cramping at 3-4 on the pain scale of 1-10. ( patient reports like hunger pains)    Patient was placed on the schedule to be seen at 2:10PM today    Patient advised to rest , increase po fluids and if her symptoms change she is to go to the Er to be evaluated.     Patient verbalized understanding

## 2021-04-15 NOTE — TELEPHONE ENCOUNTER
Patient advised of earlier appointment time of 1:00pm and patient is unable to do that since her  wants to come with her. Patient states she is feeling a little better since coming home to rest .. Patient verbalized understanding.

## 2021-04-15 NOTE — PROGRESS NOTES
1517-Dr. Smith at bedside to see patient, discussing POC to stay overnight, if labs stable, pt may have regular diet    1607-RN called [facility] office-will see pt tomorrow and office will call in the AM with a time    1612-Perfect serve sent to Dr. Darek Casiano asking for something for pt headache prn. Orders received for tylenol 650 mg PO Q4H PRN HA, mild pain, and regular diet. No further orders received. 1805-Pt sitting up in bed, just finished dinner,  at bedside, Pt asks when she will get her labetalol dose, RN explains it was ordered for 1900 tonight, pt states that is fine, denies needing anything at this time. 1900-Bedside shift change report given to TAMMY Blackwell RN (oncoming nurse) by Carmen Albrecht RN (offgoing nurse). Report included the following information SBAR, Intake/Output, MAR, Recent Results and Med Rec Status.

## 2021-04-15 NOTE — PROGRESS NOTES
4/15/2021  3:51 PM    Observation notice provided in writing to patient and/or caregiver as well as verbal explanation of the policy. Patients who are in outpatient status also receive the Observation notice.     Marty Ramos
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
## 2021-04-15 NOTE — PROGRESS NOTES
1900- Verbal shift change report given to TAMMY Bah RN (oncoming nurse) by Bart Ho RN (offgoing nurse). Report included the following information SBAR, Intake/Output, MAR and Recent Results. 0700- Bedside shift change report given to GINA Caballero RN (oncoming nurse) by TAMMY Bah RN (offgoing nurse). Report included the following information SBAR, Intake/Output, MAR and Recent Results.

## 2021-04-16 VITALS
TEMPERATURE: 98.5 F | RESPIRATION RATE: 16 BRPM | OXYGEN SATURATION: 100 % | HEIGHT: 64 IN | SYSTOLIC BLOOD PRESSURE: 148 MMHG | HEART RATE: 87 BPM | WEIGHT: 208 LBS | DIASTOLIC BLOOD PRESSURE: 94 MMHG | BODY MASS INDEX: 35.51 KG/M2

## 2021-04-16 LAB
COLLECT DURATION TIME UR: 24 HR
COLLECT DURATION TIME UR: 24 HR
CREAT 24H UR-MRATE: 1356 MG/24HR (ref 600–1800)
PROT 24H UR-MRATE: 192 MG/24HR
SPECIMEN VOL ?TM UR: 2400 ML
SPECIMEN VOL ?TM UR: 2400 ML

## 2021-04-16 PROCEDURE — 76819 FETAL BIOPHYS PROFIL W/O NST: CPT | Performed by: OBSTETRICS & GYNECOLOGY

## 2021-04-16 PROCEDURE — 99217 PR OBSERVATION CARE DISCHARGE MANAGEMENT: CPT | Performed by: OBSTETRICS & GYNECOLOGY

## 2021-04-16 PROCEDURE — 74011250637 HC RX REV CODE- 250/637: Performed by: OBSTETRICS & GYNECOLOGY

## 2021-04-16 PROCEDURE — 99218 HC RM OBSERVATION: CPT

## 2021-04-16 PROCEDURE — 59025 FETAL NON-STRESS TEST: CPT

## 2021-04-16 RX ADMIN — FAMOTIDINE 20 MG: 20 TABLET, FILM COATED ORAL at 15:23

## 2021-04-16 RX ADMIN — ASPIRIN 81 MG: 81 TABLET, COATED ORAL at 08:23

## 2021-04-16 RX ADMIN — LABETALOL HYDROCHLORIDE 100 MG: 100 TABLET, FILM COATED ORAL at 08:23

## 2021-04-16 RX ADMIN — BUPROPION HYDROCHLORIDE 150 MG: 150 TABLET, EXTENDED RELEASE ORAL at 08:23

## 2021-04-16 RX ADMIN — Medication 1 TABLET: at 08:23

## 2021-04-16 NOTE — PROGRESS NOTES
Indication: Hypertension, Pre-existing 3rd Tri  O10.013.   ____________________________________________________________________________  History: Age: 27 years. : 1 Para: 0.   Current Pregnancy: Blood group: A Rhesus D-positive. Pre- pregnancy data: Weight 180 lbs. Height 5 ft 5 ins. BMI 30.0.  ____________________________________________________________________________  Dating:  LMP: 20 EDC: 21 GA by LMP: 32w3d  Best Overall Assessment: 21 EDC: 21 Assessed GA: 32w3d  The Best Overall Assessment is based on the LMP.  ____________________________________________________________________________  Anatomy Scan:  Espinoza gestation. Fetal heart activity: present. Fetal heart rate: 137 bpm.   Fetal presentation: cephalic. Placenta: anterior. Fetal Anatomy:  Visualized with normal appearance: gastrointestinal tract, bladder. Heart: Limited views. Summary of Ultrasound Findings:  Transabdominal US. U/S machine: Perfect E8 Expert.     ____________________________________________________________________________  Fetal Wellbeing Assessment:  Amniotic fluid: normal. ADOLFO: 10.4 cm. MVP: 3.9 cm. Q2: 2.7 cm. Q3: 3.9 cm. Q4: 3.8 cm. Biophysical Profile: Fetal body movements: normal (2), Fetal tone: normal (2), Fetal breathing movements: normal (2), Amniotic fluid volume: normal (2). Score 8 / 8. Summary: Reassuring fetal status. ____________________________________________________________________________  Report Summary:  Impression: Ms. Carol Acosta is admitted for worsening hypertension. She has been on labetalol 100ng bid and denies missed doses. She reports having a severe range blood pressure when she checked her BP while at work (). She denies HA or vision change. No severe range BPs this admission. Some SBP are close (157). Pre-e labs are unremarkable. Upr/cr is 0.2 and 24Uprot is being collected. NST has been reactive.  EFW last week was normal. ULTRASOUND:  Today ADOLFO is normal and BPP is 8/8. Reassuring fetal surveillance. COUNSELING:    I reviewed home-monitoring BP and pre-eclampsia precuations. 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 She can be back at the hospital if ever has sx of pre-e or SBP>160 or DBP >110. We discussed that it is reasonable to be discharged to home as she does not meet criteria for severe pre-eclampsia, but if she meets criteria she will be given betamethasone and delivered by 34w0. If BP remains stable, delivery at or neat 37w0 is recommended. I spent 15 min on this consult, >50% counseling the pt face-to-face. Recommendations: Consider discharge to home today, however if BP is near severe range, monitoring inpatient until tomorrow is reasonable. If she is diagnosed with Severe disease, please cont inpatient admission, give BMZ and Magnesium, and attempt expectant mgmt with delivery at 34w0. Otherwise, continue weekly BPP. Check labs every 1-2 weeks. If she remains stable will plan delivery around 37 wks.

## 2021-04-16 NOTE — PROGRESS NOTES
0710 Bedside and Verbal shift change report given to GINA Wild RN and Nan Urbina RN (oncoming nurse) by TAMMY Bah RN (offgoing nurse). Report included the following information SBAR, Kardex, Intake/Output, MAR and Recent Results. 0920 Pt confirms fetal movement and denies any contractions    0925 US adjusted by RN and pt turned R lateral. Pt states every time RN bedside fetal movement noted and no audible decels    0932 Dr. Citlali Cruz bedside reviewing POC. MD states ok for pt to go for walk off unit for up to an hour    0952 NST reactive, strip verified with [address] to go outside paper signed by pt and witnessed by [address]. Pt aware of parameters for leaving unit    1253 pt off floor for [facility] appointment    0241 1914 pt back in room from [facility] appointment    1525 Dr. Citlali Cruz reviewed EFM notes and states that pt can go home after 24hr urine is sent around 1600. MD continues that [facility] and [facility] bedrest.    9702 Discharge instructions reviewed with pt and pt husbands. Precautions reviewed and all questions answered, pt aware of follow up appoinments. IV x 1 removed.  Pt ambulatory off unit with  to transport home

## 2021-04-16 NOTE — DISCHARGE INSTRUCTIONS
Counting Your Baby's Kicks: Care Instructions  Your Care Instructions     Counting your baby's kicks is one way your doctor can tell that your baby is healthy. Most women--especially in a first pregnancy--feel their baby move for the first time between 16 and 22 weeks. The movement may feel like flutters rather than kicks. Your baby may move more at certain times of the day. When you are active, you may notice less kicking than when you are resting. At your prenatal visits, your doctor will ask whether the baby is active. In your last trimester, your doctor may ask you to count the number of times you feel your baby move. Follow-up care is a key part of your treatment and safety. Be sure to make and go to all appointments, and call your doctor if you are having problems. It's also a good idea to know your test results and keep a list of the medicines you take. How do you count fetal kicks? · A common method of checking your baby's movement is to count the number of kicks or moves you feel in 1 hour. Ten movements (such as kicks, flutters, or rolls) in 1 hour are normal. Some doctors suggest that you count in the morning until you get to 10 movements. Then you can quit for that day and start again the next day. · Pick your baby's most active time of day to count. This may be any time from morning to evening. · If you do not feel 10 movements in an hour, your baby may be sleeping. Wait for the next hour and count again. When should you call for help? Call your doctor now or seek immediate medical care if:    · You noticed that your baby has stopped moving or is moving much less than normal.   Watch closely for changes in your health, and be sure to contact your doctor if you have any problems. Where can you learn more? Go to http://www.gray.com/  Enter [id number] in the search box to learn more about \"Counting Your Baby's Kicks: Care Instructions. \"  Current as of: October 8, 2020               Content Version: 12.8   [employer]. Care instructions adapted under license by [employer] (which disclaims liability or warranty for this information). If you have questions about a medical condition or this instruction, always ask your healthcare professional. [address] any warranty or liability for your use of this information. Weeks 32 to 34 of Your Pregnancy: Care Instructions  Overview     During the last few weeks of your pregnancy, you may have more aches and pains. It's important to rest when you can. Your growing baby is putting more pressure on your bladder. So you may need to urinate more often. Hemorrhoids are also common. These are painful, itchy veins in the rectal area. You may want to talk with your doctor about banking your baby's umbilical cord blood. This is the blood left in the cord after birth. If you want to save this blood, you must arrange it ahead of time. You can't decide at the last minute. If you haven't already had the Tdap shot during this pregnancy, talk to your doctor about getting it. It will help protect your  against pertussis infection. Follow-up care is a key part of your treatment and safety. Be sure to make and go to all appointments, and call your doctor if you are having problems. It's also a good idea to know your test results and keep a list of the medicines you take. How can you care for yourself at home? Ease hemorrhoids  · Get more liquids, fruits, vegetables, and fiber in your diet. This will help keep your stools soft. · Avoid sitting for too long. Lie on your left side several times a day. · Clean yourself with soft, moist toilet paper. Or you can use witch hazel pads or personal hygiene pads. · If you are uncomfortable, try ice packs. Or you can sit in a warm sitz bath. Do these for 20 minutes at a time, as needed.   · Use hydrocortisone cream for pain and itching. Two examples are Anusol and Preparation H Hydrocortisone. · Ask your doctor about taking an over-the-counter stool softener. Consider breastfeeding  · Experts recommend that women breastfeed for 1 year or longer. · Breast milk may help protect your child from some health problems.  babies are less likely than formula-fed babies to:  ? Get ear infections, colds, diarrhea, and pneumonia. ? Be obese or get diabetes later in life. · Women who breastfeed have less bleeding after the birth. Their uteruses also shrink back faster. · Some women who breastfeed lose weight faster. Making milk burns calories. · Breastfeeding can lower your risk of breast cancer, ovarian cancer, and osteoporosis. Decide about circumcision for boys  · As you make this decision, it may help to think about your personal, [employer]eterian, and family traditions. You get to decide if you will keep your son's penis natural or if he will be circumcised. · If you decide that you would like to have your baby circumcised, talk with your doctor. You can share your concerns about pain. And you can discuss your preferences for anesthesia. Where can you learn more? Go to http://www.elliott.com/  Enter X711 in the search box to learn more about \"Weeks 32 to 34 of Your Pregnancy: Care Instructions. \"  Current as of: October 8, 2020               Content Version: 12.8  © 2006-2021 [employer]. Care instructions adapted under license by [employer] (which disclaims liability or warranty for this information). If you have questions about a medical condition or this instruction, always ask your healthcare professional. [place] [address] any warranty or liability for your use of this information.          High Blood Pressure in Pregnancy: Care Instructions  Your Care Instructions     High blood pressure (hypertension) means that the force of blood against your artery walls is too strong. Mild high blood pressure during pregnancy is not usually dangerous. Your doctor will probably just want to watch you closely. But when blood pressure is very high, it can reduce oxygen to your baby. This can affect how well your baby grows. High blood pressure also means that you are at higher risk for:  · Preeclampsia. This is a problem that includes high blood pressure and damage to your liver or kidneys. It can also reduce how much oxygen your baby gets. In some cases, it leads to eclampsia. Eclampsia causes seizures. · Placental abruption. This is a problem when the placenta separates from the uterus before birth. It prevents the baby from getting enough oxygen and nutrients. Sometimes it can cause death for the baby and the mother. To prevent problems for you or your baby, you will have to check your blood pressure often. You will do this until after your baby is born. If your blood pressure rises suddenly or is very high during your pregnancy, your doctor may prescribe medicines. They can usually control blood pressure. If your blood pressure affects your or your baby's health, your doctor may need to deliver your baby early. After your baby is born, your blood pressure will probably improve. But sometimes blood pressure problems continue after birth. Follow-up care is a key part of your treatment and safety. Be sure to make and go to all appointments, and call your doctor if you are having problems. It's also a good idea to know your test results and keep a list of the medicines you take. How can you care for yourself at home? · Take and write down your blood pressure at home if your doctor says to. · Take your medicines exactly as prescribed. Call your doctor if you think you are having a problem with your medicine. · Do not smoke. This is one of the best things you can do to help your baby be healthy.  If you need help quitting, talk to your doctor about stop-smoking programs and medicines. These can increase your chances of quitting for good. · Don't gain too much weight during pregnancy. Talk to your doctor about how much weight gain is healthy. · Eat a healthy diet. · If your doctor says it's okay, get regular exercise. Walking or swimming several times a week can be healthy for you and your baby. · Reduce stress, and find time to relax. This is very important if you continue to work or have a busy schedule. It's also important if you have small children at home. When should you call for help? Call 911 anytime you think you may need emergency care. For example, call if:    · You passed out (lost consciousness).     · You have a seizure. Call your doctor now or seek immediate medical care if:    · You have symptoms of preeclampsia, such as:  ? Sudden swelling of your face, hands, or feet. ? New vision problems (such as dimness, blurring, or seeing spots). ? A severe headache.     · Your blood pressure is higher than it should be or rises suddenly.     · You have new nausea or vomiting.     · You think that you are in labor.     · You have pain in your belly or pelvis. Watch closely for changes in your health, and be sure to contact your doctor if:    · You gain weight rapidly. Where can you learn more? Go to http://www.gray.com/  Enter A052 in the search box to learn more about \"High Blood Pressure in Pregnancy: Care Instructions. \"  Current as of: October 8, 2020               Content Version: 12.8  © 4984-8598 [employer]. Care instructions adapted under license by [employer] (which disclaims liability or warranty for this information). If you have questions about a medical condition or this instruction, always ask your healthcare professional. [place] [address] any warranty or liability for your use of this information.

## 2021-04-16 NOTE — PROGRESS NOTES
AntePartum High Risk Pregnancy Note    Drew All  32w3d    Patient admitted for elevated BP 32w3d Estimated Date of Delivery: 21 states she denies headache, blurred vision, sob, cp. Vitals:    Patient Vitals for the past 24 hrs:   BP Temp Pulse Resp SpO2 Height Weight   21 0717 (!) 140/87 98.5 °F (36.9 °C) 71 16      21 0302 (!) 140/72 98 °F (36.7 °C) 71 16      04/15/21 2300 (!) 143/93         04/15/21 1922 (!) 154/93 98.7 °F (37.1 °C) 75 16      04/15/21 1612 131/64  63       04/15/21 1557 (!) 155/80  69       04/15/21 1543 (!) 155/87  67       04/15/21 1527 (!) 149/98  67       04/15/21 1512 (!) 141/93  72  100 %     04/15/21 1508      5' 4\" (1.626 m) 208 lb (94.3 kg)   04/15/21 1457 (!) 147/95  74  100 %     04/15/21 1438 (!) 157/95 98.8 °F (37.1 °C) 77 16        Temp (24hrs), Av.5 °F (36.9 °C), Min:98 °F (36.7 °C), Max:98.8 °F (37.1 °C)    I&O:   No intake/output data recorded. No intake/output data recorded. Exam:  Patient without distress.                Abdomen soft, non-tender               Fundus soft and non tender               Uterine Activity: None               NST:  Reactive, baseline 150, moderate variability, + accels, no decels, no contractions, monitoroed > 30  minutes          Labs:   Recent Results (from the past 24 hour(s))   CBC W/O DIFF    Collection Time: 04/15/21  2:50 PM   Result Value Ref Range    WBC 11.2 (H) 3.6 - 11.0 K/uL    RBC 3.54 (L) 3.80 - 5.20 M/uL    HGB 10.4 (L) 11.5 - 16.0 g/dL    HCT 32.2 (L) 35.0 - 47.0 %    MCV 91.0 80.0 - 99.0 FL    MCH 29.4 26.0 - 34.0 PG    MCHC 32.3 30.0 - 36.5 g/dL    RDW 13.2 11.5 - 14.5 %    PLATELET 099 795 - 838 K/uL    MPV 12.8 8.9 - 12.9 FL    NRBC 0.0 0  WBC    ABSOLUTE NRBC 0.00 0.00 - 8.00 K/uL   METABOLIC PANEL, COMPREHENSIVE    Collection Time: 04/15/21  2:50 PM   Result Value Ref Range    Sodium 138 136 - 145 mmol/L    Potassium 4.0 3.5 - 5.1 mmol/L    Chloride 108 97 - 108 mmol/L    CO2 22 21 - 32 mmol/L    Anion gap 8 5 - 15 mmol/L    Glucose 94 65 - 100 mg/dL    BUN 11 6 - 20 MG/DL    Creatinine 0.39 (L) 0.55 - 1.02 MG/DL    BUN/Creatinine ratio 28 (H) 12 - 20      GFR est AA >60 >60 ml/min/1.73m2    GFR est non-AA >60 >60 ml/min/1.73m2    Calcium 8.8 8.5 - 10.1 MG/DL    Bilirubin, total 0.3 0.2 - 1.0 MG/DL    ALT (SGPT) 22 12 - 78 U/L    AST (SGOT) 14 (L) 15 - 37 U/L    Alk. phosphatase 141 (H) 45 - 117 U/L    Protein, total 5.6 (L) 6.4 - 8.2 g/dL    Albumin 2.7 (L) 3.5 - 5.0 g/dL    Globulin 2.9 2.0 - 4.0 g/dL    A-G Ratio 0.9 (L) 1.1 - 2.2     PROTEIN/CREATININE RATIO, URINE    Collection Time: 04/15/21  3:40 PM   Result Value Ref Range    Protein, urine random 6 0.0 - 11.9 mg/dL    Creatinine, urine 26.00 mg/dL    Protein/Creat. urine Ratio 0.2         Assessment and Plan:     27 y.o.   at [address] [facility] admitted for elevated BP's. PCR 0.2.  24 hour urine pending. BP's mild range.  -continuje labetalol 100 mg bid  -f/u with 24 hour urine  -f/u with [facility]  -likely discharge to home this afternoon after [facility] appt if no significant findings. I spent 30 minutes or less with the patient to perform a final examination, discuss the hospital stay, give instructions for continuing care to all relevant caregivers and prepare discharge records, prescriptions and referral forms.

## 2021-04-19 ENCOUNTER — HOSPITAL ENCOUNTER (OUTPATIENT)
Dept: PERINATAL CARE | Age: 31
Discharge: HOME | End: 2021-04-19
Attending: OBSTETRICS & GYNECOLOGY
Payer: COMMERCIAL

## 2021-04-19 PROCEDURE — 76819 FETAL BIOPHYS PROFIL W/O NST: CPT | Performed by: OBSTETRICS & GYNECOLOGY

## 2021-04-19 NOTE — PROGRESS NOTES
Normal results, add to prenatal records. We can review in detail with patient at next visit. [address] message sent if active.   Add 24 hr ur prot to pl w date 192 mg

## 2021-04-20 ENCOUNTER — ROUTINE PRENATAL (OUTPATIENT)
Dept: OBGYN CLINIC | Age: 31
End: 2021-04-20
Payer: COMMERCIAL

## 2021-04-20 VITALS
SYSTOLIC BLOOD PRESSURE: 124 MMHG | BODY MASS INDEX: 34.83 KG/M2 | WEIGHT: 204 LBS | DIASTOLIC BLOOD PRESSURE: 79 MMHG | HEART RATE: 75 BPM | HEIGHT: 64 IN

## 2021-04-20 DIAGNOSIS — O11.9 PRE-ECLAMPSIA SUPERIMPOSED ON CHRONIC HYPERTENSION: Primary | ICD-10-CM

## 2021-04-20 PROCEDURE — 0502F SUBSEQUENT PRENATAL CARE: CPT | Performed by: OBSTETRICS & GYNECOLOGY
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
## 2021-04-20 NOTE — LETTER
NOTIFICATION RETURN TO WORK / SCHOOL 
 
4/20/2021 9:16 AM 
 
  
 
Ms. Kimberlyn Coulter [address] [facility] [id number] To Whom It May Concern: 
 
Kimberlyn Coulter is currently under the care of [address] [place] [address]. Please excuse her from work from 4/20/2021 - 4/26/2021. If there are questions or concerns please have the patient contact our office. Sincerely, Whitley Craig LPN for Farzaneh Akers MD

## 2021-04-20 NOTE — PATIENT INSTRUCTIONS
Weeks 34 to 36 of Your Pregnancy: Care Instructions  Overview     By now, your baby and your belly have grown quite large. It's almost time to give birth! Your baby's lungs are almost ready to breathe air. The skull bones are firm enough to protect your baby's head, but soft enough to move down through the birth canal.  You may be feeling excited and happy at times--but also anxious or scared. You might wonder how you'll know if you're in labor or what to expect during labor. Try to be open and flexible in your expectations of the birth. Because each birth is different, there's no way to know exactly what childbirth will be like for you. Talk to your doctor or midwife about any concerns you have. If you haven't already had the Tdap shot during this pregnancy, talk to your doctor about getting it. It will help protect your  against pertussis infection. In the 36th week, most women have a test for group B streptococcus (GBS). GBS is a common bacteria that can live in the vagina and rectum. It can make your baby sick after birth. If you test positive, you will get antibiotics during labor. The medicine will help keep your baby from getting the bacteria. Follow-up care is a key part of your treatment and safety. Be sure to make and go to all appointments, and call your doctor if you are having problems. It's also a good idea to know your test results and keep a list of the medicines you take. How can you care for yourself at home? Learn about pain relief choices  · Pain is different for every woman. Talk with your doctor about your feelings about pain. · You can choose from several types of pain relief. These include medicine or breathing techniques, as well as comfort measures. You can use more than one option. · If you choose to have pain medicine during labor, talk to your doctor about your options. Some medicines lower anxiety and help with some of the pain.  Others make your lower body numb so that you won't feel pain. · Be sure to tell your doctor about your pain medicine choice before you start labor or very early in your labor. You may be able to change your mind as labor progresses. · Rarely, a woman is put to sleep by medicine given through a mask or an IV. Labor and delivery  · The first stage of labor has three parts: early, active, and transition. ? Most women have early labor at home. You can stay busy or rest, eat light snacks, drink clear fluids, and start counting contractions. ? When talking during a contraction gets hard, you may be moving to active labor. During active labor, you should head for the hospital if you are not there already. ? You are in active labor when contractions come every 3 to 4 minutes and last about 60 seconds. Your cervix is opening more rapidly. ? If your water breaks, contractions will come faster and stronger. ? During transition, your cervix is stretching, and contractions are coming more rapidly. ? You may want to push, but your cervix might not be ready. Your doctor will tell you when to push. · The second stage starts when your cervix is completely opened and you are ready to push. ? Contractions are very strong to push the baby down the birth canal.  ? You will feel the urge to push. You may feel like you need to have a bowel movement. ? You may be coached to push with contractions. These contractions will be very strong, but you will not have them as often. You can get a little rest between contractions. ? You may be emotional and irritable. You may not be aware of what is going on around you.  ? One last push, and your baby is born. · The third stage is when a few more contractions push out the placenta. This may take 30 minutes or less. · The fourth stage is the welcome recovery. You may feel overwhelmed with emotions and exhausted but alert. This is a good time to start breastfeeding. Where can you learn more?   Go to http://www.gray.com/  Enter G751 in the search box to learn more about \"Weeks 34 to 36 of Your Pregnancy: Care Instructions. \"  Current as of: October 8, 2020               Content Version: 12.8  © 0696-1255 [employer]wise, [employer]. Care instructions adapted under license by [employer] (which disclaims liability or warranty for this information). If you have questions about a medical condition or this instruction, always ask your healthcare professional. [address] any warranty or liability for your use of this information.

## 2021-04-21 NOTE — PATIENT INSTRUCTIONS
Weeks 32 to 34 of Your Pregnancy: Care Instructions Overview During the last few weeks of your pregnancy, you may have more aches and pains. It's important to rest when you can. Your growing baby is putting more pressure on your bladder. So you may need to urinate more often. Hemorrhoids are also common. These are painful, itchy veins in the rectal area. You may want to talk with your doctor about banking your baby's umbilical cord blood. This is the blood left in the cord after birth. If you want to save this blood, you must arrange it ahead of time. You can't decide at the last minute. If you haven't already had the Tdap shot during this pregnancy, talk to your doctor about getting it. It will help protect your  against pertussis infection. Follow-up care is a key part of your treatment and safety. Be sure to make and go to all appointments, and call your doctor if you are having problems. It's also a good idea to know your test results and keep a list of the medicines you take. How can you care for yourself at home? Ease hemorrhoids · Get more liquids, fruits, vegetables, and fiber in your diet. This will help keep your stools soft. · Avoid sitting for too long. Lie on your left side several times a day. · Clean yourself with soft, moist toilet paper. Or you can use witch hazel pads or personal hygiene pads. · If you are uncomfortable, try ice packs. Or you can sit in a warm sitz bath. Do these for 20 minutes at a time, as needed. · Use hydrocortisone cream for pain and itching. Two examples are Anusol and Preparation H Hydrocortisone. · Ask your doctor about taking an over-the-counter stool softener. Consider breastfeeding · Experts recommend that women breastfeed for 1 year or longer. · Breast milk may help protect your child from some health problems.  babies are less likely than formula-fed babies to: 
? Get ear infections, colds, diarrhea, and pneumonia. ?  Be obese or get diabetes later in life. · Women who breastfeed have less bleeding after the birth. Their uteruses also shrink back faster. · Some women who breastfeed lose weight faster. Making milk burns calories. · Breastfeeding can lower your risk of breast cancer, ovarian cancer, and osteoporosis. Decide about circumcision for boys · As you make this decision, it may help to think about your personal, Jewish, and family traditions. You get to decide if you will keep your son's penis natural or if he will be circumcised. · If you decide that you would like to have your baby circumcised, talk with your doctor. You can share your concerns about pain. And you can discuss your preferences for anesthesia. Where can you learn more? Go to http://www.gray.com/ Enter Z234 in the search box to learn more about \"Weeks 32 to 34 of Your Pregnancy: Care Instructions. \" Current as of: October 8, 2020               Content Version: 12.8 © 6780-0438 [employer]wise, [employer]. Care instructions adapted under license by [employer] (which disclaims liability or warranty for this information). If you have questions about a medical condition or this instruction, always ask your healthcare professional. [place] [address] any warranty or liability for your use of this information.

## 2021-04-22 ENCOUNTER — HOSPITAL ENCOUNTER (EMERGENCY)
Age: 31
Discharge: HOME | End: 2021-04-22
Attending: OBSTETRICS & GYNECOLOGY | Admitting: OBSTETRICS & GYNECOLOGY
Payer: COMMERCIAL

## 2021-04-22 ENCOUNTER — ROUTINE PRENATAL (OUTPATIENT)
Dept: OBGYN CLINIC | Age: 31
End: 2021-04-22

## 2021-04-22 VITALS
BODY MASS INDEX: 35.68 KG/M2 | DIASTOLIC BLOOD PRESSURE: 78 MMHG | HEIGHT: 64 IN | RESPIRATION RATE: 16 BRPM | OXYGEN SATURATION: 100 % | SYSTOLIC BLOOD PRESSURE: 133 MMHG | HEART RATE: 90 BPM | WEIGHT: 209 LBS | TEMPERATURE: 98.3 F

## 2021-04-22 VITALS
BODY MASS INDEX: 35.34 KG/M2 | HEART RATE: 80 BPM | HEIGHT: 64 IN | WEIGHT: 207 LBS | SYSTOLIC BLOOD PRESSURE: 149 MMHG | DIASTOLIC BLOOD PRESSURE: 88 MMHG

## 2021-04-22 DIAGNOSIS — Z34.00 PRENATAL CARE OF PRIMIGRAVIDA, ANTEPARTUM: ICD-10-CM

## 2021-04-22 DIAGNOSIS — O10.019 PRE-EXISTING ESSENTIAL HYPERTENSION DURING PREGNANCY, ANTEPARTUM: ICD-10-CM

## 2021-04-22 DIAGNOSIS — O11.9 CHRONIC HYPERTENSION WITH SUPERIMPOSED PRE-ECLAMPSIA: Primary | ICD-10-CM

## 2021-04-22 DIAGNOSIS — Z3A.33 33 WEEKS GESTATION OF PREGNANCY: ICD-10-CM

## 2021-04-22 LAB
ALBUMIN SERPL-MCNC: 2.7 G/DL (ref 3.5–5)
ALBUMIN/GLOB SERPL: 0.9 {RATIO} (ref 1.1–2.2)
ALP SERPL-CCNC: 162 U/L (ref 45–117)
ALT SERPL-CCNC: 23 U/L (ref 12–78)
ANION GAP SERPL CALC-SCNC: 8 MMOL/L (ref 5–15)
AST SERPL-CCNC: 16 U/L (ref 15–37)
BASOPHILS # BLD: 0.1 K/UL (ref 0–0.1)
BASOPHILS NFR BLD: 1 % (ref 0–1)
BILIRUB SERPL-MCNC: 0.2 MG/DL (ref 0.2–1)
BUN SERPL-MCNC: 10 MG/DL (ref 6–20)
BUN/CREAT SERPL: 20 (ref 12–20)
CALCIUM SERPL-MCNC: 8.6 MG/DL (ref 8.5–10.1)
CHLORIDE SERPL-SCNC: 108 MMOL/L (ref 97–108)
CO2 SERPL-SCNC: 23 MMOL/L (ref 21–32)
CREAT SERPL-MCNC: 0.51 MG/DL (ref 0.55–1.02)
CREAT UR-MCNC: 79 MG/DL
DIFFERENTIAL METHOD BLD: ABNORMAL
EOSINOPHIL # BLD: 0.5 K/UL (ref 0–0.4)
EOSINOPHIL NFR BLD: 4 % (ref 0–7)
ERYTHROCYTE [DISTWIDTH] IN BLOOD BY AUTOMATED COUNT: 13.4 % (ref 11.5–14.5)
GLOBULIN SER CALC-MCNC: 2.9 G/DL (ref 2–4)
GLUCOSE SERPL-MCNC: 110 MG/DL (ref 65–100)
HCT VFR BLD AUTO: 32.1 % (ref 35–47)
HGB BLD-MCNC: 10.8 G/DL (ref 11.5–16)
IMM GRANULOCYTES # BLD AUTO: 0 K/UL
IMM GRANULOCYTES NFR BLD AUTO: 0 %
LYMPHOCYTES # BLD: 1.6 K/UL (ref 0.8–3.5)
LYMPHOCYTES NFR BLD: 13 % (ref 12–49)
MCH RBC QN AUTO: 29.9 PG (ref 26–34)
MCHC RBC AUTO-ENTMCNC: 33.6 G/DL (ref 30–36.5)
MCV RBC AUTO: 88.9 FL (ref 80–99)
MONOCYTES # BLD: 0.3 K/UL (ref 0–1)
MONOCYTES NFR BLD: 2 % (ref 5–13)
NEUTS BAND NFR BLD MANUAL: 5 % (ref 0–6)
NEUTS SEG # BLD: 10 K/UL (ref 1.8–8)
NEUTS SEG NFR BLD: 75 % (ref 32–75)
NRBC # BLD: 0 K/UL (ref 0–0.01)
NRBC BLD-RTO: 0 PER 100 WBC
PLATELET # BLD AUTO: 160 K/UL (ref 150–400)
PMV BLD AUTO: 12.6 FL (ref 8.9–12.9)
POTASSIUM SERPL-SCNC: 3.8 MMOL/L (ref 3.5–5.1)
PROT SERPL-MCNC: 5.6 G/DL (ref 6.4–8.2)
PROT UR-MCNC: 14 MG/DL (ref 0–11.9)
PROT/CREAT UR-RTO: 0.2
RBC # BLD AUTO: 3.61 M/UL (ref 3.8–5.2)
RBC MORPH BLD: ABNORMAL
SODIUM SERPL-SCNC: 139 MMOL/L (ref 136–145)
WBC # BLD AUTO: 12.5 K/UL (ref 3.6–11)

## 2021-04-22 PROCEDURE — 59025 FETAL NON-STRESS TEST: CPT

## 2021-04-22 PROCEDURE — 85025 COMPLETE CBC W/AUTO DIFF WBC: CPT

## 2021-04-22 PROCEDURE — 99213 OFFICE O/P EST LOW 20 MIN: CPT | Performed by: OBSTETRICS & GYNECOLOGY

## 2021-04-22 PROCEDURE — 36415 COLL VENOUS BLD VENIPUNCTURE: CPT

## 2021-04-22 PROCEDURE — 84156 ASSAY OF PROTEIN URINE: CPT

## 2021-04-22 PROCEDURE — 99285 EMERGENCY DEPT VISIT HI MDM: CPT

## 2021-04-22 PROCEDURE — 80053 COMPREHEN METABOLIC PANEL: CPT

## 2021-04-22 PROCEDURE — 0502F SUBSEQUENT PRENATAL CARE: CPT | Performed by: OBSTETRICS & GYNECOLOGY

## 2021-04-22 RX ORDER — LABETALOL 100 MG/1
100 TABLET, FILM COATED ORAL 2 TIMES DAILY
Status: DISCONTINUED | OUTPATIENT
Start: 2021-04-23 | End: 2021-04-23 | Stop reason: HOSPADM

## 2021-04-22 NOTE — PROGRESS NOTES
7:17 PM  SBAR report received from Stefan Lee RN. Assumed care of the patient at this time. 7:27 PM  Spoke to Dr. Ally Ghosh about the patients plan of care. Patient may come off of the monitor post reactive NST.   7:53 PM  Assessment with VS done. Patient taken off of the monitor. Patient has no needs at this time. 8:56 PM  SBAR report given to Roshan Peraza RN. Care of the patient turned over at this time.

## 2021-04-22 NOTE — PROGRESS NOTES
1829: This RN at bedside to triage and assess patient. Patient resting in position of comfort in locked and lowered bed. Patient recently provided clean catch urine specimen in room bathroom. Patient accompanied by Jorje Mckeon. Patient reports current 1/10 pain r/t to \"pressure\"-like HA sensation. Patient reports she believes that she's beginning to get a HA. Patient has hx of migraines. Patient denies current visual disturbances, RUQ pain, epigastric pain, or increased extremity swelling. No edema noted. Patient reports taking 100mg labetalol PO BID for hx of EHTN. Patient reports + FM. Patient denies vaginal bleeding or vaginal discharge. Patient placed on EFM for NST.    1851: PIV established, blood drawn per MD order. Urine specimen collected from voided specimen for PCR. Patient and spouse oriented to room, call bell use, emergency call bell use, and thermostat. Patient provided with ice water. 1917: Verbal shift change report given to Wilberto Ulloa RN (oncoming nurse) by Rodney Lorenzo RN (offgoing nurse). Report included the following information SBAR, MAR and Recent Results.

## 2021-04-22 NOTE — PROGRESS NOTES
[facility]  [email or web address]  [phone]    Paula Tapia MD, FACOG     Follow-up OB visit    Chief Complaint   Patient presents with   [facility] Routine Prenatal Visit       Patient Active Problem List    Diagnosis Date Noted    Hypertension in pregnancy 04/15/2021    Pre-existing essential hypertension during pregnancy, antepartum 2020    Prenatal care of primigravida, antepartum 2020    Sepsis ([address] 2016    ADD (attention deficit disorder) 2014    Depression 2014          The patient reports the following concerns: BP at home this morning was 169/96. Yesterday she woke up late & was late on her BP medicine. C/o soreness in her lower abdomen. C/o vaginal swelling. She went went grocery shopping yesterday & had to sit in the car to rest because she was fatigued & her ankles hurt. C/o when she coughs hard she will vomit. C/o smells bother. She reports she feels like she did before her BP spiked last time. More fatigue. Vitals:    21 0927   BP: (!) 149/88   Pulse: 80   Weight: 207 lb (93.9 kg)   Height: 5' 4\" (1.626 m)     See PN flowsheet for exam    27 y.o.  33w2d   Encounter Diagnoses   Name Primary?  Prenatal care of primigravida, antepartum     Chronic hypertension with superimposed pre-eclampsia Yes    33 weeks gestation of pregnancy     Pre-existing essential hypertension during pregnancy, antepartum        Disc out pt labs vs inpt monitoring for GHTN  Disc concerned re severe range BP, over baseline at home. Disc risk of preeclampsia progression and risk to baby/fetus and mother.   Pt would like to return to hospital for labs/serial BP/NST  Report called: pt needs to go home to exchange cars first because partner does not have transportation  Reviewed PIH precautions and rec return to hospital as soon as she is able but OK to exchange cars because currently mildly elevated BP, but if repeat severe at home, rec return to hospital asap     [] SAB/bleeding precautions reviewed   [] PTL/PPROM precautions reviewed   [] Labor precautions reviewed   [] Fetal kick counts discussed   [] Labs reviewed with patient   [] Unique Zelayaer precautions reviewed   [] Consent reviewed   [] Handouts given to pt   [] Glucola handout    [] GBS/labor/Magic Hour handout   []    [] We reviewed CDC recommendations for Tdap for patient and close contacts and RBA of receiving in pregnancy, advised obtaining in third trimester   [] Reviewed healthy nutrition in pregnancy and good exercise practices   [] We disc safer medications in pregnancy and referred patient to [facility] resources   [] We reviewed CDC recommendations for flu vaccine and RBA of receiving in pregnancy   []    []    []       Follow-up and Dispositions    · Return in about 1 week (around 4/29/2021) for Follow up OB visit. No orders of the defined types were placed in this encounter.       Hill Andres MD

## 2021-04-23 NOTE — PROGRESS NOTES
9:03 PM  Assumed care of patient. VSS and labs wnl. Dr Ned Paz notified and orders to discharge patient. 9:11 PM  Discharge instructions provided to patient. All questions answered. Pt ambulatory of unit with significant other.

## 2021-04-23 NOTE — DISCHARGE INSTRUCTIONS
Patient Education   Patient Education   Patient Education        High Blood Pressure in Pregnancy: Care Instructions  Your Care Instructions     High blood pressure (hypertension) means that the force of blood against your artery walls is too strong. Mild high blood pressure during pregnancy is not usually dangerous. Your doctor will probably just want to watch you closely. But when blood pressure is very high, it can reduce oxygen to your baby. This can affect how well your baby grows. High blood pressure also means that you are at higher risk for:  · Preeclampsia. This is a problem that includes high blood pressure and damage to your liver or kidneys. It can also reduce how much oxygen your baby gets. In some cases, it leads to eclampsia. Eclampsia causes seizures. · Placental abruption. This is a problem when the placenta separates from the uterus before birth. It prevents the baby from getting enough oxygen and nutrients. Sometimes it can cause death for the baby and the mother. To prevent problems for you or your baby, you will have to check your blood pressure often. You will do this until after your baby is born. If your blood pressure rises suddenly or is very high during your pregnancy, your doctor may prescribe medicines. They can usually control blood pressure. If your blood pressure affects your or your baby's health, your doctor may need to deliver your baby early. After your baby is born, your blood pressure will probably improve. But sometimes blood pressure problems continue after birth. Follow-up care is a key part of your treatment and safety. Be sure to make and go to all appointments, and call your doctor if you are having problems. It's also a good idea to know your test results and keep a list of the medicines you take. How can you care for yourself at home? · Take and write down your blood pressure at home if your doctor says to. · Take your medicines exactly as prescribed.  Call your doctor if you think you are having a problem with your medicine. · Do not smoke. This is one of the best things you can do to help your baby be healthy. If you need help quitting, talk to your doctor about stop-smoking programs and medicines. These can increase your chances of quitting for good. · Don't gain too much weight during pregnancy. Talk to your doctor about how much weight gain is healthy. · Eat a healthy diet. · If your doctor says it's okay, get regular exercise. Walking or swimming several times a week can be healthy for you and your baby. · Reduce stress, and find time to relax. This is very important if you continue to work or have a busy schedule. It's also important if you have small children at home. When should you call for help? Call 911 anytime you think you may need emergency care. For example, call if:    · You passed out (lost consciousness).     · You have a seizure. Call your doctor now or seek immediate medical care if:    · You have symptoms of preeclampsia, such as:  ? Sudden swelling of your face, hands, or feet. ? New vision problems (such as dimness, blurring, or seeing spots). ? A severe headache.     · Your blood pressure is higher than it should be or rises suddenly.     · You have new nausea or vomiting.     · You think that you are in labor.     · You have pain in your belly or pelvis. Watch closely for changes in your health, and be sure to contact your doctor if:    · You gain weight rapidly. Where can you learn more? Go to http://www.gray.com/  Enter A052 in the search box to learn more about \"High Blood Pressure in Pregnancy: Care Instructions. \"  Current as of: October 8, 2020               Content Version: 12.8  © 1097-3261 [employer]. Care instructions adapted under license by [employer] (which disclaims liability or warranty for this information).  If you have questions about a medical condition or this instruction, always ask your healthcare professional. [place] [address] any warranty or liability for your use of this information. Weeks 34 to 36 of Your Pregnancy: Care Instructions  Overview     By now, your baby and your belly have grown quite large. It's almost time to give birth! Your baby's lungs are almost ready to breathe air. The skull bones are firm enough to protect your baby's head, but soft enough to move down through the birth canal.  You may be feeling excited and happy at times--but also anxious or scared. You might wonder how you'll know if you're in labor or what to expect during labor. Try to be open and flexible in your expectations of the birth. Because each birth is different, there's no way to know exactly what childbirth will be like for you. Talk to your doctor or midwife about any concerns you have. If you haven't already had the Tdap shot during this pregnancy, talk to your doctor about getting it. It will help protect your  against pertussis infection. In the 36th week, most women have a test for group B streptococcus (GBS). GBS is a common bacteria that can live in the vagina and rectum. It can make your baby sick after birth. If you test positive, you will get antibiotics during labor. The medicine will help keep your baby from getting the bacteria. Follow-up care is a key part of your treatment and safety. Be sure to make and go to all appointments, and call your doctor if you are having problems. It's also a good idea to know your test results and keep a list of the medicines you take. How can you care for yourself at home? Learn about pain relief choices  · Pain is different for every woman. Talk with your doctor about your feelings about pain. · You can choose from several types of pain relief. These include medicine or breathing techniques, as well as comfort measures. You can use more than one option.   · If you choose to have pain medicine during labor, talk to your doctor about your options. Some medicines lower anxiety and help with some of the pain. Others make your lower body numb so that you won't feel pain. · Be sure to tell your doctor about your pain medicine choice before you start labor or very early in your labor. You may be able to change your mind as labor progresses. · Rarely, a woman is put to sleep by medicine given through a mask or an IV. Labor and delivery  · The first stage of labor has three parts: early, active, and transition. ? Most women have early labor at home. You can stay busy or rest, eat light snacks, drink clear fluids, and start counting contractions. ? When talking during a contraction gets hard, you may be moving to active labor. During active labor, you should head for the hospital if you are not there already. ? You are in active labor when contractions come every 3 to 4 minutes and last about 60 seconds. Your cervix is opening more rapidly. ? If your water breaks, contractions will come faster and stronger. ? During transition, your cervix is stretching, and contractions are coming more rapidly. ? You may want to push, but your cervix might not be ready. Your doctor will tell you when to push. · The second stage starts when your cervix is completely opened and you are ready to push. ? Contractions are very strong to push the baby down the birth canal.  ? You will feel the urge to push. You may feel like you need to have a bowel movement. ? You may be coached to push with contractions. These contractions will be very strong, but you will not have them as often. You can get a little rest between contractions. ? You may be emotional and irritable. You may not be aware of what is going on around you.  ? One last push, and your baby is born. · The third stage is when a few more contractions push out the placenta. This may take 30 minutes or less. · The fourth stage is the welcome recovery.  You may feel overwhelmed with emotions and exhausted but alert. This is a good time to start breastfeeding. Where can you learn more? Go to http://www.gray.com/  Enter B912 in the search box to learn more about \"Weeks 34 to 36 of Your Pregnancy: Care Instructions. \"  Current as of: 2020               Content Version: 12.8  © 1118-5827 [employer]. Care instructions adapted under license by [employer] (which disclaims liability or warranty for this information). If you have questions about a medical condition or this instruction, always ask your healthcare professional. [place] [address] any warranty or liability for your use of this information. Pregnancy Precautions: Care Instructions  Your Care Instructions     There is no sure way to prevent labor before your due date ( labor) or to prevent most other pregnancy problems. But there are things you can do to increase your chances of a healthy pregnancy. Go to your appointments, follow your doctor's advice, and take good care of yourself. Eat well, and exercise (if your doctor agrees). And make sure to drink plenty of water. Follow-up care is a key part of your treatment and safety. Be sure to make and go to all appointments, and call your doctor if you are having problems. It's also a good idea to know your test results and keep a list of the medicines you take. How can you care for yourself at home? · Make sure you go to your prenatal appointments. At each visit, your doctor will check your blood pressure. Your doctor will also check to see if you have protein in your urine. High blood pressure and protein in urine are signs of preeclampsia. This condition can be dangerous for you and your baby. · Drink plenty of fluids. Dehydration can cause contractions.  If you have kidney, heart, or liver disease and have to limit fluids, talk with your doctor before you increase the amount of fluids you drink. · Tell your doctor right away if you notice any symptoms of an infection, such as:  ? Burning when you urinate. ? A foul-smelling discharge from your vagina. ? Vaginal itching. ? Unexplained fever. ? Unusual pain or soreness in your uterus or lower belly. · Eat a balanced diet. Include plenty of foods that are high in calcium and iron. ? Foods high in calcium include milk, cheese, yogurt, almonds, and broccoli. ? Foods high in iron include red meat, shellfish, poultry, eggs, beans, raisins, whole-grain bread, and leafy green vegetables. · Do not smoke. If you need help quitting, talk to your doctor about stop-smoking programs and medicines. These can increase your chances of quitting for good. · Do not drink alcohol or use marijuana or illegal drugs. · Follow your doctor's directions about activity. Your doctor will let you know how much, if any, exercise you can do. · Ask your doctor if you can have sex. If you are at risk for early labor, your doctor may ask you to not have sex. · Take care to prevent falls. During pregnancy, your joints are loose, and your balance is off. Sports such as bicycling, skiing, or in-line skating can increase your risk of falling. And don't ride horses or motorcycles, dive, water ski, scuba dive, or parachute jump while you are pregnant. · Avoid getting very hot. Do not use saunas or hot tubs. Avoid staying out in the sun in hot weather for long periods. Take acetaminophen (Tylenol) to lower a high fever. · Do not take any over-the-counter or herbal medicines or supplements without talking to your doctor or pharmacist first.  When should you call for help? Call 911 anytime you think you may need emergency care.  For example, call if:    · You passed out (lost consciousness).     · You have a seizure.     · You have severe vaginal bleeding.     · You have severe pain in your belly or pelvis.     · You have had fluid gushing or leaking from your vagina and you know or think the umbilical cord is bulging into your vagina. If this happens, immediately get down on your knees so your rear end (buttocks) is higher than your head. This will decrease the pressure on the cord until help arrives. Call your doctor now or seek immediate medical care if:    · You have signs of preeclampsia, such as:  ? Sudden swelling of your face, hands, or feet. ? New vision problems (such as dimness, blurring, or seeing spots). ? A severe headache.     · You have any vaginal bleeding.     · You have belly pain or cramping.     · You have a fever.     · You have had regular contractions (with or without pain) for an hour. This means that you have 8 or more within 1 hour or 4 or more in 20 minutes after you change your position and drink fluids.     · You have a sudden release of fluid from your vagina.     · You have low back pain or pelvic pressure that does not go away.     · You notice that your baby has stopped moving or is moving much less than normal.   Watch closely for changes in your health, and be sure to contact your doctor if you have any problems. Where can you learn more? Go to http://www.elliott.com/  Enter Y951 in the search box to learn more about \"Pregnancy Precautions: Care Instructions. \"  Current as of: October 8, 2020               Content Version: 12.8  © 2006-2021 [employer]. Care instructions adapted under license by [employer] (which disclaims liability or warranty for this information). If you have questions about a medical condition or this instruction, always ask your healthcare professional. [place] [address] any warranty or liability for your use of this information.
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
## 2021-04-23 NOTE — PATIENT INSTRUCTIONS
Weeks 32 to 34 of Your Pregnancy: Care Instructions Overview During the last few weeks of your pregnancy, you may have more aches and pains. It's important to rest when you can. Your growing baby is putting more pressure on your bladder. So you may need to urinate more often. Hemorrhoids are also common. These are painful, itchy veins in the rectal area. You may want to talk with your doctor about banking your baby's umbilical cord blood. This is the blood left in the cord after birth. If you want to save this blood, you must arrange it ahead of time. You can't decide at the last minute. If you haven't already had the Tdap shot during this pregnancy, talk to your doctor about getting it. It will help protect your  against pertussis infection. Follow-up care is a key part of your treatment and safety. Be sure to make and go to all appointments, and call your doctor if you are having problems. It's also a good idea to know your test results and keep a list of the medicines you take. How can you care for yourself at home? Ease hemorrhoids · Get more liquids, fruits, vegetables, and fiber in your diet. This will help keep your stools soft. · Avoid sitting for too long. Lie on your left side several times a day. · Clean yourself with soft, moist toilet paper. Or you can use witch hazel pads or personal hygiene pads. · If you are uncomfortable, try ice packs. Or you can sit in a warm sitz bath. Do these for 20 minutes at a time, as needed. · Use hydrocortisone cream for pain and itching. Two examples are Anusol and Preparation H Hydrocortisone. · Ask your doctor about taking an over-the-counter stool softener. Consider breastfeeding · Experts recommend that women breastfeed for 1 year or longer. · Breast milk may help protect your child from some health problems.  babies are less likely than formula-fed babies to: 
? Get ear infections, colds, diarrhea, and pneumonia. ?  Be obese or get diabetes later in life. · Women who breastfeed have less bleeding after the birth. Their uteruses also shrink back faster. · Some women who breastfeed lose weight faster. Making milk burns calories. · Breastfeeding can lower your risk of breast cancer, ovarian cancer, and osteoporosis. Decide about circumcision for boys · As you make this decision, it may help to think about your personal, [employer], and family traditions. You get to decide if you will keep your son's penis natural or if he will be circumcised. · If you decide that you would like to have your baby circumcised, talk with your doctor. You can share your concerns about pain. And you can discuss your preferences for anesthesia. Where can you learn more? Go to http://www.gray.com/ Enter U177 in the search box to learn more about \"Weeks 32 to 34 of Your Pregnancy: Care Instructions. \" Current as of: October 8, 2020               Content Version: 12.8 © 3177-9975 [employer]. Care instructions adapted under license by [employer] (which disclaims liability or warranty for this information). If you have questions about a medical condition or this instruction, always ask your healthcare professional. [place] [address] any warranty or liability for your use of this information.

## 2021-04-26 ENCOUNTER — HOSPITAL ENCOUNTER (OUTPATIENT)
Dept: PERINATAL CARE | Age: 31
Discharge: HOME | End: 2021-04-26
Attending: OBSTETRICS & GYNECOLOGY
Payer: COMMERCIAL

## 2021-04-26 ENCOUNTER — HOSPITAL ENCOUNTER (INPATIENT)
Age: 31
LOS: 25 days | Discharge: HOME | End: 2021-05-21
Attending: OBSTETRICS & GYNECOLOGY | Admitting: OBSTETRICS & GYNECOLOGY
Payer: COMMERCIAL

## 2021-04-26 ENCOUNTER — ROUTINE PRENATAL (OUTPATIENT)
Dept: OBGYN CLINIC | Age: 31
End: 2021-04-26
Payer: COMMERCIAL

## 2021-04-26 VITALS
DIASTOLIC BLOOD PRESSURE: 106 MMHG | WEIGHT: 207.4 LBS | HEART RATE: 71 BPM | SYSTOLIC BLOOD PRESSURE: 171 MMHG | BODY MASS INDEX: 35.6 KG/M2

## 2021-04-26 DIAGNOSIS — O10.913 PRE-EXISTING HYPERTENSION DURING PREGNANCY IN THIRD TRIMESTER, UNSPECIFIED PRE-EXISTING HYPERTENSION TYPE: ICD-10-CM

## 2021-04-26 DIAGNOSIS — O11.9 CHRONIC HYPERTENSION WITH SUPERIMPOSED PRE-ECLAMPSIA: ICD-10-CM

## 2021-04-26 DIAGNOSIS — R03.0 ELEVATED BLOOD PRESSURE READING: ICD-10-CM

## 2021-04-26 DIAGNOSIS — O10.919 HTN IN PREGNANCY, CHRONIC: Primary | ICD-10-CM

## 2021-04-26 DIAGNOSIS — Z3A.33 33 WEEKS GESTATION OF PREGNANCY: ICD-10-CM

## 2021-04-26 DIAGNOSIS — Z98.891 S/P C-SECTION: Primary | ICD-10-CM

## 2021-04-26 LAB
ALBUMIN SERPL-MCNC: 2.7 G/DL (ref 3.5–5)
ALBUMIN/GLOB SERPL: 0.8 {RATIO} (ref 1.1–2.2)
ALP SERPL-CCNC: 161 U/L (ref 45–117)
ALT SERPL-CCNC: 24 U/L (ref 12–78)
ANION GAP SERPL CALC-SCNC: 6 MMOL/L (ref 5–15)
AST SERPL-CCNC: 25 U/L (ref 15–37)
BASOPHILS # BLD: 0.1 K/UL (ref 0–0.1)
BASOPHILS NFR BLD: 0 % (ref 0–1)
BILIRUB SERPL-MCNC: 0.2 MG/DL (ref 0.2–1)
BUN SERPL-MCNC: 10 MG/DL (ref 6–20)
BUN/CREAT SERPL: 21 (ref 12–20)
CALCIUM SERPL-MCNC: 8.8 MG/DL (ref 8.5–10.1)
CHLORIDE SERPL-SCNC: 107 MMOL/L (ref 97–108)
CO2 SERPL-SCNC: 22 MMOL/L (ref 21–32)
CREAT SERPL-MCNC: 0.47 MG/DL (ref 0.55–1.02)
CREAT UR-MCNC: 19 MG/DL
DIFFERENTIAL METHOD BLD: ABNORMAL
EOSINOPHIL # BLD: 0.2 K/UL (ref 0–0.4)
EOSINOPHIL NFR BLD: 2 % (ref 0–7)
ERYTHROCYTE [DISTWIDTH] IN BLOOD BY AUTOMATED COUNT: 13.7 % (ref 11.5–14.5)
GLOBULIN SER CALC-MCNC: 3.3 G/DL (ref 2–4)
GLUCOSE SERPL-MCNC: 86 MG/DL (ref 65–100)
HCT VFR BLD AUTO: 33.5 % (ref 35–47)
HGB BLD-MCNC: 11.3 G/DL (ref 11.5–16)
IMM GRANULOCYTES # BLD AUTO: 0.3 K/UL (ref 0–0.04)
IMM GRANULOCYTES NFR BLD AUTO: 2 % (ref 0–0.5)
LYMPHOCYTES # BLD: 1.7 K/UL (ref 0.8–3.5)
LYMPHOCYTES NFR BLD: 12 % (ref 12–49)
MCH RBC QN AUTO: 29.9 PG (ref 26–34)
MCHC RBC AUTO-ENTMCNC: 33.7 G/DL (ref 30–36.5)
MCV RBC AUTO: 88.6 FL (ref 80–99)
MONOCYTES # BLD: 1 K/UL (ref 0–1)
MONOCYTES NFR BLD: 7 % (ref 5–13)
NEUTS SEG # BLD: 10.2 K/UL (ref 1.8–8)
NEUTS SEG NFR BLD: 77 % (ref 32–75)
NRBC # BLD: 0 K/UL (ref 0–0.01)
NRBC BLD-RTO: 0 PER 100 WBC
PLATELET # BLD AUTO: 166 K/UL (ref 150–400)
PMV BLD AUTO: 12.7 FL (ref 8.9–12.9)
POTASSIUM SERPL-SCNC: 4.8 MMOL/L (ref 3.5–5.1)
PROT SERPL-MCNC: 6 G/DL (ref 6.4–8.2)
PROT UR-MCNC: 8 MG/DL (ref 0–11.9)
PROT/CREAT UR-RTO: 0.4
RBC # BLD AUTO: 3.78 M/UL (ref 3.8–5.2)
SODIUM SERPL-SCNC: 135 MMOL/L (ref 136–145)
WBC # BLD AUTO: 13.4 K/UL (ref 3.6–11)

## 2021-04-26 PROCEDURE — 85025 COMPLETE CBC W/AUTO DIFF WBC: CPT

## 2021-04-26 PROCEDURE — 76819 FETAL BIOPHYS PROFIL W/O NST: CPT | Performed by: OBSTETRICS & GYNECOLOGY

## 2021-04-26 PROCEDURE — 82570 ASSAY OF URINE CREATININE: CPT

## 2021-04-26 PROCEDURE — 65270000029 HC RM PRIVATE

## 2021-04-26 PROCEDURE — 74011250636 HC RX REV CODE- 250/636: Performed by: OBSTETRICS & GYNECOLOGY

## 2021-04-26 PROCEDURE — 77030040361 HC SLV COMPR DVT MDII -B

## 2021-04-26 PROCEDURE — 80053 COMPREHEN METABOLIC PANEL: CPT

## 2021-04-26 PROCEDURE — 99219 PR INITIAL OBSERVATION CARE/DAY 50 MINUTES: CPT | Performed by: OBSTETRICS & GYNECOLOGY

## 2021-04-26 PROCEDURE — 36415 COLL VENOUS BLD VENIPUNCTURE: CPT

## 2021-04-26 PROCEDURE — 59426 ANTEPARTUM CARE ONLY: CPT | Performed by: OBSTETRICS & GYNECOLOGY

## 2021-04-26 PROCEDURE — 99218 HC RM OBSERVATION: CPT

## 2021-04-26 PROCEDURE — 59025 FETAL NON-STRESS TEST: CPT | Performed by: OBSTETRICS & GYNECOLOGY

## 2021-04-26 PROCEDURE — 96374 THER/PROPH/DIAG INJ IV PUSH: CPT

## 2021-04-26 PROCEDURE — 74011250637 HC RX REV CODE- 250/637: Performed by: OBSTETRICS & GYNECOLOGY

## 2021-04-26 PROCEDURE — 96361 HYDRATE IV INFUSION ADD-ON: CPT

## 2021-04-26 PROCEDURE — 0502F SUBSEQUENT PRENATAL CARE: CPT | Performed by: OBSTETRICS & GYNECOLOGY

## 2021-04-26 PROCEDURE — 99285 EMERGENCY DEPT VISIT HI MDM: CPT

## 2021-04-26 RX ORDER — SODIUM CHLORIDE 0.9 % (FLUSH) 0.9 %
5-40 SYRINGE (ML) INJECTION EVERY 8 HOURS
Status: DISCONTINUED | OUTPATIENT
Start: 2021-04-26 | End: 2021-05-06

## 2021-04-26 RX ORDER — HYDRALAZINE HYDROCHLORIDE 20 MG/ML
10 INJECTION INTRAMUSCULAR; INTRAVENOUS
Status: ACTIVE | OUTPATIENT
Start: 2021-04-26 | End: 2021-04-27

## 2021-04-26 RX ORDER — DIPHENHYDRAMINE HCL 25 MG
50 CAPSULE ORAL
Status: DISCONTINUED | OUTPATIENT
Start: 2021-04-26 | End: 2021-05-18 | Stop reason: DRUGHIGH

## 2021-04-26 RX ORDER — LABETALOL HCL 20 MG/4 ML
40 SYRINGE (ML) INTRAVENOUS
Status: ACTIVE | OUTPATIENT
Start: 2021-04-26 | End: 2021-04-27

## 2021-04-26 RX ORDER — SODIUM CHLORIDE, SODIUM LACTATE, POTASSIUM CHLORIDE, CALCIUM CHLORIDE 600; 310; 30; 20 MG/100ML; MG/100ML; MG/100ML; MG/100ML
125 INJECTION, SOLUTION INTRAVENOUS CONTINUOUS
Status: DISCONTINUED | OUTPATIENT
Start: 2021-04-26 | End: 2021-05-02

## 2021-04-26 RX ORDER — LABETALOL 200 MG/1
200 TABLET, FILM COATED ORAL 2 TIMES DAILY
Status: DISCONTINUED | OUTPATIENT
Start: 2021-04-26 | End: 2021-05-21 | Stop reason: HOSPADM

## 2021-04-26 RX ORDER — LABETALOL 100 MG/1
100 TABLET, FILM COATED ORAL 2 TIMES DAILY
Status: DISCONTINUED | OUTPATIENT
Start: 2021-04-26 | End: 2021-04-26

## 2021-04-26 RX ORDER — BETAMETHASONE SODIUM PHOSPHATE AND BETAMETHASONE ACETATE 3; 3 MG/ML; MG/ML
12 INJECTION, SUSPENSION INTRA-ARTICULAR; INTRALESIONAL; INTRAMUSCULAR; SOFT TISSUE EVERY 24 HOURS
Status: COMPLETED | OUTPATIENT
Start: 2021-04-26 | End: 2021-04-27

## 2021-04-26 RX ORDER — ACETAMINOPHEN 325 MG/1
650 TABLET ORAL
Status: DISCONTINUED | OUTPATIENT
Start: 2021-04-26 | End: 2021-05-21 | Stop reason: HOSPADM

## 2021-04-26 RX ORDER — CALCIUM CARBONATE 200(500)MG
200 TABLET,CHEWABLE ORAL
Status: DISCONTINUED | OUTPATIENT
Start: 2021-04-27 | End: 2021-04-28

## 2021-04-26 RX ORDER — LABETALOL HCL 20 MG/4 ML
80 SYRINGE (ML) INTRAVENOUS
Status: ACTIVE | OUTPATIENT
Start: 2021-04-26 | End: 2021-04-27

## 2021-04-26 RX ORDER — LABETALOL HCL 20 MG/4 ML
20 SYRINGE (ML) INTRAVENOUS
Status: COMPLETED | OUTPATIENT
Start: 2021-04-26 | End: 2021-04-26

## 2021-04-26 RX ORDER — SODIUM CHLORIDE, SODIUM LACTATE, POTASSIUM CHLORIDE, CALCIUM CHLORIDE 600; 310; 30; 20 MG/100ML; MG/100ML; MG/100ML; MG/100ML
1000 INJECTION, SOLUTION INTRAVENOUS ONCE
Status: COMPLETED | OUTPATIENT
Start: 2021-04-26 | End: 2021-04-26

## 2021-04-26 RX ORDER — SODIUM CHLORIDE 0.9 % (FLUSH) 0.9 %
5-40 SYRINGE (ML) INJECTION AS NEEDED
Status: DISCONTINUED | OUTPATIENT
Start: 2021-04-26 | End: 2021-05-06

## 2021-04-26 RX ADMIN — SODIUM CHLORIDE, POTASSIUM CHLORIDE, SODIUM LACTATE AND CALCIUM CHLORIDE 1000 ML: 600; 310; 30; 20 INJECTION, SOLUTION INTRAVENOUS at 10:51

## 2021-04-26 RX ADMIN — ACETAMINOPHEN 650 MG: 325 TABLET ORAL at 12:56

## 2021-04-26 RX ADMIN — BETAMETHASONE SODIUM PHOSPHATE AND BETAMETHASONE ACETATE 12 MG: 3; 3 INJECTION, SUSPENSION INTRA-ARTICULAR; INTRALESIONAL; INTRAMUSCULAR at 17:04

## 2021-04-26 RX ADMIN — LABETALOL HYDROCHLORIDE 20 MG: 5 INJECTION, SOLUTION INTRAVENOUS at 12:18

## 2021-04-26 RX ADMIN — LABETALOL HYDROCHLORIDE 200 MG: 200 TABLET, FILM COATED ORAL at 19:59

## 2021-04-26 RX ADMIN — DIPHENHYDRAMINE HYDROCHLORIDE 50 MG: 25 CAPSULE ORAL at 22:49

## 2021-04-26 NOTE — PROGRESS NOTES
0920: Pt arrived from [facility] 120 office visit to L&D for super-imposed pre-e work up. Pt escorted to rm 207. Pt states HA since beginning of 3rd trimester, no change in pain at this time. Pt endorses good FM. Pt denies SHEEHAN/SOB/vision changes/ RUQ pain. 1015: Dr. Danny Adams sent Perfect serve regarding pt c/o contractions with contractions picking up on monitor every 2-3min. MD also made aware of pending labs and several BPs in 150's/90's.     1019: Per Dr. Jennifer Crisostomo, admin 1L IVF LR bolus. No change in BP meds at this time. 1051: IVF LR bolus started at this time. 1116: Dr. Isabella Rangel via perfect serve to let MD know that labs have resulted. Waiting for response at this time. 1212: Dr. Danny Adams sent perfect serve regarding 2 consecutive severe range BPs. MD asked to let RN know of any necessary interventions to treat BPs.      1213: Dr. Danny Adams replying gissell perfect serve notifying MD regarding severe range BPs. Per MD- start Labetalol protocol. 1218: Pt educated on Labetalol protocol. Pt verbalizing understanding. 20mg IV labetalol given at this time, will continue to retake every 10min. Pt c/o HA with neck pain. Pt states that she has only been taking Tylenol at home for HA. Will notify MD.     [phone]: Dr. Danny Adams at bedside. Pt stating some pain from contractions. MD would like to check cervix to rule out  labor. SVE per MD- pt closed/20/high. MD educating pt on mayda chen contractions. MD stating that pt will not be discharged today. 1256: Pt given Tylenol for 3/10 HA. 1330: Pt eating at this time. This RN able to get FHT in 130's. Will let pt eat, and will then have pt reposition for FHR/TOCO to be adjusted. 1425: Rounding on pt. Pt states that contractions have become more spaced out/less painful as seen on monitor. Pt denies any new needs at this time.      1519: Perfect serve sent to Dr Danny Adams to determine if pt will remain on same dosage of Labetalol 100mg BID, or if dosage will be modified. 1523: Per Dr. Jens Arizmendi, pt can resume medication at same dosage and frequency. 100mg PO Labetalol ordered BID starting today at 1900.     1657: Dr. Jens Arizmendi rounding on pt at this time. MD stated that she was in room and pt stated that HA was no longer present, and contractions were causing minimal to no discomfort. Per Dr. Jens Arizmendi, pt can come off of monitor if not feeling contractions. If contractions begin to feel more painful and closer together Per MD- can do NST BID, labetalol 200mg BID, and betamethasone. 1700: EFM/TOCO removed at this time. Pt educated on betamethasone injection, pt verbalized understanding and is agreeable. 1704: First dose of beta admin at this time. 1830: Rounding on pt at this time. Pt denies any new complaints or worsening contractions. BPs have remained out of severe range. 1905: Bedside and Verbal shift change report given to Yusra Walker RN (oncoming nurse) by Jackie Andrews RN (offgoing nurse). Report included the following information SBAR, Kardex, Intake/Output, MAR, Recent Results, Med Rec Status and Quality Measures.

## 2021-04-26 NOTE — PROGRESS NOTES
4/26/2021  3:53 PM    CM met with GEOGRE to complete initial assessment and begin discharge planning. MOB verified and confirmed demographics. GEORGE lives with spouse/FOB- Nidhi Sr ([phone]), at the address on file. GEORGE is employed full time at [employer], and plans to take 6-8 wks off from work, once baby is born. FOB is also employed and will be taking 2-3wks off. GEORGE reports she has good family support. GEORGE plans to breast feed baby and has pump to use at home. GEORGE has pediatrician in mind, but has not finalized who she will follow up with. GEORGE has car seat, bassinet/crib, clothing, bottles and all necessary supplies for baby. GEORGE has [employer], and will be adding baby to this policy. CM discussed process to add baby to insurance, MOB verbalized understanding. GEORGE denied WIC/Medicaid services. Observation notice provided in writing to patient and/or caregiver as well as verbal explanation of the policy. Patients who are in outpatient status also receive the Observation notice. Care Management Interventions  PCP Verified by CM: Yes(Luis)  Mode of Transport at Discharge:  Other (see comment)  Transition of Care Consult (CM Consult): Discharge Planning  Current Support Network: Own Home, Family Lives Nearby, Lives with Spouse  Confirm Follow Up Transport: Family  Discharge Location  Discharge Placement: Home with family assistance

## 2021-04-26 NOTE — PROGRESS NOTES
_ [facility]  [email or web address]  [phone]    Nita Russell MD, FACOG     Follow-up OB visit    Chief Complaint   Patient presents with   [facility] Routine Prenatal Visit       Patient Active Problem List    Diagnosis Date Noted    Hypertension in pregnancy 04/15/2021    Pre-existing essential hypertension during pregnancy, antepartum 12/17/2020    Prenatal care of primigravida, antepartum 11/06/2020    Sepsis ([address] 01/04/2016    ADD (attention deficit disorder) 11/21/2014    Depression 11/21/2014          The patient reports the following concerns: Patient reports feeling lightheaded and dizzy on this past Friday night. Vitals:    04/26/21 0849   BP: (!) 171/106   Pulse: 71   Weight: 207 lb 6.4 oz (94.1 kg)     See PN flowsheet for exam    27 y.o. [address] [facility]   Encounter Diagnoses   Name Primary?     HTN in pregnancy, chronic Yes    Elevated blood pressure reading     33 weeks gestation of pregnancy          Work note, out of work for remainder of pregnancy  To L and D, report and orders called to [employer]  PIH/stroke precautions reviewed with pt     [] SAB/bleeding precautions reviewed   [] PTL/PPROM precautions reviewed   [] Labor precautions reviewed   [] Fetal kick counts discussed   [] Labs reviewed with patient   [] [place] precautions reviewed   [] Consent reviewed   [] Handouts given to pt   [] Glucola handout    [] GBS/labor/Magic Hour handout   []    [] We reviewed CDC recommendations for Tdap for patient and close contacts and RBA of receiving in pregnancy, advised obtaining in third trimester   [] Reviewed healthy nutrition in pregnancy and good exercise practices   [] We disc safer medications in pregnancy and referred patient to [facility] resources   [] We reviewed CDC recommendations for flu vaccine and RBA of receiving in pregnancy   []    []    []       Follow-up and Dispositions    · Return in about 1 year (around 4/26/2022) for routine prenatal care, Follow up OB visit. No orders of the defined types were placed in this encounter.       Ann Marie Martinez MD
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
## 2021-04-26 NOTE — LETTER
4/26/2021 3:48 PM 
 
Ms. Kitty Soto [address] [facility] [id number] To Whom it may concern, Please excuse Ms. Kitty Soto to be out of work during of the remainder of her pregnancy ordered by the MD. If you have any questions please get Deloria Roles to contact us. Sincerely, Justine Olsen LPN for Samuel Alvarado MD

## 2021-04-27 PROCEDURE — 74011250636 HC RX REV CODE- 250/636: Performed by: OBSTETRICS & GYNECOLOGY

## 2021-04-27 PROCEDURE — 65270000029 HC RM PRIVATE

## 2021-04-27 PROCEDURE — 99218 HC RM OBSERVATION: CPT

## 2021-04-27 PROCEDURE — 74011250637 HC RX REV CODE- 250/637: Performed by: OBSTETRICS & GYNECOLOGY

## 2021-04-27 PROCEDURE — 59025 FETAL NON-STRESS TEST: CPT

## 2021-04-27 RX ORDER — SWAB
1 SWAB, NON-MEDICATED MISCELLANEOUS DAILY
Status: DISCONTINUED | OUTPATIENT
Start: 2021-04-27 | End: 2021-05-08

## 2021-04-27 RX ORDER — ASPIRIN 81 MG/1
81 TABLET ORAL DAILY
Status: DISCONTINUED | OUTPATIENT
Start: 2021-04-27 | End: 2021-05-17

## 2021-04-27 RX ORDER — BUPROPION HYDROCHLORIDE 150 MG/1
150 TABLET ORAL
Status: DISCONTINUED | OUTPATIENT
Start: 2021-04-27 | End: 2021-05-21 | Stop reason: HOSPADM

## 2021-04-27 RX ADMIN — LABETALOL HYDROCHLORIDE 200 MG: 200 TABLET, FILM COATED ORAL at 09:47

## 2021-04-27 RX ADMIN — Medication 10 ML: at 14:00

## 2021-04-27 RX ADMIN — CALCIUM CARBONATE (ANTACID) CHEW TAB 500 MG 200 MG: 500 CHEW TAB at 16:48

## 2021-04-27 RX ADMIN — BUPROPION HYDROCHLORIDE 150 MG: 150 TABLET, EXTENDED RELEASE ORAL at 09:47

## 2021-04-27 RX ADMIN — Medication 1 TABLET: at 16:53

## 2021-04-27 RX ADMIN — CALCIUM CARBONATE (ANTACID) CHEW TAB 500 MG 200 MG: 500 CHEW TAB at 11:05

## 2021-04-27 RX ADMIN — Medication 81 MG: at 09:47

## 2021-04-27 RX ADMIN — CALCIUM CARBONATE (ANTACID) CHEW TAB 500 MG 200 MG: 500 CHEW TAB at 07:16

## 2021-04-27 RX ADMIN — BETAMETHASONE SODIUM PHOSPHATE AND BETAMETHASONE ACETATE 12 MG: 3; 3 INJECTION, SUSPENSION INTRA-ARTICULAR; INTRALESIONAL; INTRAMUSCULAR at 16:48

## 2021-04-27 RX ADMIN — LABETALOL HYDROCHLORIDE 200 MG: 200 TABLET, FILM COATED ORAL at 17:55

## 2021-04-27 NOTE — PROGRESS NOTES
Ante Partum Progress Note    Bart Fraction  34w0d  LOS: 0      Patient reports decreased fetal movement this am, no loss or gush of fluid, no vaginal bleeding, no significant or increase in vaginal discharge, no significant contractions or abdominal pain     Patient denies chest pain, shortness of breath, right upper quadrant pain, vision changes  She reports a mild HA 1/10. Vitals:  Patient Vitals for the past 24 hrs:   Temp Pulse Resp BP SpO2   21 1206 -- -- -- 132/77 --   21 0721 98.8 °F (37.1 °C) 82 16 (!) 140/85 --   21 0233 97.8 °F (36.6 °C) 75 14 138/82 --   21 2200 -- 76 -- 134/79 --   21 1958 -- 77 -- (!) 143/81 --   21 1836 -- 74 -- (!) 142/88 --   21 1736 -- 75 -- (!) 146/83 --   21 1636 -- 73 -- 130/79 --   21 1632 -- -- -- -- 99 %   21 1533 -- 73 -- 125/77 --   21 1516 -- -- -- -- 99 %   21 1511 -- -- -- -- 100 %   21 1502 -- 76 -- 128/80 --   21 1451 -- -- -- -- 100 %   21 1429 -- 78 -- 129/78 --   21 1415 -- 76 -- 132/80 --   21 1359 -- 78 -- 125/76 --   21 1351 -- -- -- -- 100 %   21 1346 -- -- -- -- 100 %   21 1345 -- 70 -- 125/75 --   21 1341 -- -- -- -- 100 %   21 1336 -- -- -- -- 100 %   21 1331 -- -- -- -- 100 %   21 1328 -- 75 -- (!) 159/86 --   21 1316 -- 61 -- (!) 154/91 100 %   21 1311 -- -- -- -- 100 %   21 1255 -- 70 -- (!) 134/92 --   21 1245 -- 68 -- (!) 146/92 --       Temp (24hrs), Av.3 °F (36.8 °C), Min:97.8 °F (36.6 °C), Max:98.8 °F (37.1 °C)        Last 24hr Input/Output:  No intake or output data in the 24 hours ending 21 1243   ]    Exam:    General: Patient without distress.   Abdomen: soft, non-tender  Fundus: soft, gravid, non-tender  Extremities: no redness or tenderness or cords, SCD in place        Chest  · Respiratory Effort: breathing normal        Auscultation: normal breath sounds, clear bilaterally    Cardiovascular  · Heart:  · Auscultation: regular rate and rhythm without murmur, normal S1, S2      Additional Exam: Deferred    Labs:   No results found for this or any previous visit (from the past 24 hour(s)). Assessment:   27 y.o.  34w0d   SIPIH on labetalol 200 mg BID  BMZ    Plan:  Continue hospitalization with hospitalized bedrest    I spent 15 minutes of face to face time counseling and discussing fetal movement, preeclampsia, NST with the patient. More than 50 % of her visit was spent performing counseling. Albania Franks MD    NST Inpatient Procedure Note    Maria C Acuna presents for fetal non-stress test.    Indication is [facility]. She is 34w0d. She has been monitored for more than 30 minutes. The FHR was reactive. NST Interpretation:   FHR baseline 150 bpm,   Variability:  moderate  Accelerations:  present  Decelerations Absent. Uterine contractions:  irregular    Assessment  NST is reactive. NST is reassuring. Patient does need admission/observation for further monitoring. Karleyabdelrahman Hensley was informed of the NST results and her questions were answered. Plan:    [] Continue admission on Labor and Delivery    [x] Continue observation on Labor and Delivery   [] Keep routine OB follow up upon discharge   [] Reviewed fetal movement kick counts, notify MD if decreased   [] Continue continuous fetal monitoring.    []

## 2021-04-27 NOTE — LACTATION NOTE
Discussed her desire to breastfeed. Pt has been reading and watching videos on breastfeeding. Instructed her to look at www. One Month. [email or web address] and [facility] [facility] natural breastfeeding. Gave pt info on welbutrin and breastfeeding.   Instructed Mom to reach out to lactation if she has other questions about breastfeeding

## 2021-04-27 NOTE — PROGRESS NOTES
Pt reports improved FM. HA resolved.     Visit Vitals  /77   Pulse 82   Temp 98.8 °F (37.1 °C)   Resp 16   SpO2 99%       Repeat labs in am

## 2021-04-27 NOTE — PROGRESS NOTES
Dr. Ray Knutson reviewed tracing, ok to discontinue monitor    2240- Pt c/o itching on arms & legs since receiving her labetalol, benadryl given, will monitor

## 2021-04-27 NOTE — PROGRESS NOTES
1855: Bedside and Verbal shift change report given to BERNICE Gaytan RN (oncoming nurse) by IRVIN Garduno RN (offgoing nurse). Report included the following information SBAR, Kardex, Intake/Output, MAR, Recent Results and Med Rec Status. 2140: Pt transported from room 207 to room 216. Pt ambulating w/ steady gait. 0020: RN at bedside rounding on pt. Pt voicing concerns about bathroom being dirty when she was transferred to room. RN to call environmental services to clean bathroom. 0025: EVS stating they will come to clean pt bathroom. 0200: RN rounding on pt. Pt sleeping. 0720: Bedside and Verbal shift change report given to DIANNE Aaron RN (oncoming nurse) by Phillip Merchant. Lukas RN (offgoing nurse). Report included the following information SBAR, Kardex, Intake/Output, MAR, Recent Results and Med Rec Status.

## 2021-04-27 NOTE — PROGRESS NOTES
5305  Bedside and Verbal shift change report given to IRVIN Salgado RN (oncoming nurse) by Kasie Redmond RN (offgoing nurse). Report included the following information SBAR, Kardex and MAR.   7333  Complete assessment done. Pt denies any needs at the present. 0815  BREANA Fischer RN resumed care of the pt while this writer is scrubbing OR case  9:51 AM  Resumed back care of the pt. Morning medications given. Miguel Ángel Hernandez RN lactation at the bedside to talk to the pt.  1015  Pt sitting up in the bed eating her bkft  1100  Pt OOB to the BR to shower. Complete linen change given  1215  Ate 100% of her lunch  1330  Dr Nayeli Dinning at the bedside to talk with the pt. Pt states she hasn't felt the baby moving as much today. EFM re applied for monitoring. 1500  Pt watching TV while on the FM  1600  EFM removed FM strip reactive. Pt states she definitely has felt the baby moving.  6:06 PM  Pts  at the bedside. Labetalol given per order.   Pt denies any further needs

## 2021-04-28 LAB
ALBUMIN SERPL-MCNC: 2.8 G/DL (ref 3.5–5)
ALBUMIN/GLOB SERPL: 0.9 {RATIO} (ref 1.1–2.2)
ALP SERPL-CCNC: 155 U/L (ref 45–117)
ALT SERPL-CCNC: 23 U/L (ref 12–78)
ANION GAP SERPL CALC-SCNC: 8 MMOL/L (ref 5–15)
AST SERPL-CCNC: 10 U/L (ref 15–37)
BASOPHILS # BLD: 0 K/UL (ref 0–0.1)
BASOPHILS NFR BLD: 0 % (ref 0–1)
BILIRUB SERPL-MCNC: 0.2 MG/DL (ref 0.2–1)
BUN SERPL-MCNC: 11 MG/DL (ref 6–20)
BUN/CREAT SERPL: 22 (ref 12–20)
CALCIUM SERPL-MCNC: 8.9 MG/DL (ref 8.5–10.1)
CHLORIDE SERPL-SCNC: 107 MMOL/L (ref 97–108)
CO2 SERPL-SCNC: 22 MMOL/L (ref 21–32)
CREAT SERPL-MCNC: 0.49 MG/DL (ref 0.55–1.02)
DIFFERENTIAL METHOD BLD: ABNORMAL
EOSINOPHIL # BLD: 0 K/UL (ref 0–0.4)
EOSINOPHIL NFR BLD: 0 % (ref 0–7)
ERYTHROCYTE [DISTWIDTH] IN BLOOD BY AUTOMATED COUNT: 13.6 % (ref 11.5–14.5)
GLOBULIN SER CALC-MCNC: 3 G/DL (ref 2–4)
GLUCOSE SERPL-MCNC: 126 MG/DL (ref 65–100)
HCT VFR BLD AUTO: 31.8 % (ref 35–47)
HGB BLD-MCNC: 10.8 G/DL (ref 11.5–16)
IMM GRANULOCYTES # BLD AUTO: 0 K/UL
IMM GRANULOCYTES NFR BLD AUTO: 0 %
LYMPHOCYTES # BLD: 1.5 K/UL (ref 0.8–3.5)
LYMPHOCYTES NFR BLD: 8 % (ref 12–49)
MCH RBC QN AUTO: 30.1 PG (ref 26–34)
MCHC RBC AUTO-ENTMCNC: 34 G/DL (ref 30–36.5)
MCV RBC AUTO: 88.6 FL (ref 80–99)
METAMYELOCYTES NFR BLD MANUAL: 1 %
MONOCYTES # BLD: 1.2 K/UL (ref 0–1)
MONOCYTES NFR BLD: 6 % (ref 5–13)
NEUTS BAND NFR BLD MANUAL: 1 % (ref 0–6)
NEUTS SEG # BLD: 16.3 K/UL (ref 1.8–8)
NEUTS SEG NFR BLD: 84 % (ref 32–75)
NRBC # BLD: 0 K/UL (ref 0–0.01)
NRBC BLD-RTO: 0 PER 100 WBC
PLATELET # BLD AUTO: 181 K/UL (ref 150–400)
PMV BLD AUTO: 13 FL (ref 8.9–12.9)
POTASSIUM SERPL-SCNC: 4.6 MMOL/L (ref 3.5–5.1)
PROT SERPL-MCNC: 5.8 G/DL (ref 6.4–8.2)
RBC # BLD AUTO: 3.59 M/UL (ref 3.8–5.2)
RBC MORPH BLD: ABNORMAL
SODIUM SERPL-SCNC: 137 MMOL/L (ref 136–145)
WBC # BLD AUTO: 19.2 K/UL (ref 3.6–11)

## 2021-04-28 PROCEDURE — 80053 COMPREHEN METABOLIC PANEL: CPT

## 2021-04-28 PROCEDURE — 36415 COLL VENOUS BLD VENIPUNCTURE: CPT

## 2021-04-28 PROCEDURE — 85025 COMPLETE CBC W/AUTO DIFF WBC: CPT

## 2021-04-28 PROCEDURE — 65270000029 HC RM PRIVATE

## 2021-04-28 PROCEDURE — 74011250637 HC RX REV CODE- 250/637: Performed by: OBSTETRICS & GYNECOLOGY

## 2021-04-28 PROCEDURE — 59025 FETAL NON-STRESS TEST: CPT

## 2021-04-28 RX ORDER — CALCIUM CARBONATE 200(500)MG
400 TABLET,CHEWABLE ORAL
Status: DISCONTINUED | OUTPATIENT
Start: 2021-04-28 | End: 2021-05-21 | Stop reason: HOSPADM

## 2021-04-28 RX ORDER — CALCIUM CARBONATE 200(500)MG
400 TABLET,CHEWABLE ORAL
Status: DISCONTINUED | OUTPATIENT
Start: 2021-04-28 | End: 2021-04-30 | Stop reason: SDUPTHER

## 2021-04-28 RX ADMIN — CALCIUM CARBONATE (ANTACID) CHEW TAB 500 MG 400 MG: 500 CHEW TAB at 12:20

## 2021-04-28 RX ADMIN — Medication 1 TABLET: at 09:04

## 2021-04-28 RX ADMIN — ACETAMINOPHEN 650 MG: 325 TABLET ORAL at 22:22

## 2021-04-28 RX ADMIN — Medication 10 ML: at 09:04

## 2021-04-28 RX ADMIN — CALCIUM CARBONATE (ANTACID) CHEW TAB 500 MG 400 MG: 500 CHEW TAB at 16:26

## 2021-04-28 RX ADMIN — Medication 81 MG: at 09:04

## 2021-04-28 RX ADMIN — LABETALOL HYDROCHLORIDE 200 MG: 200 TABLET, FILM COATED ORAL at 09:04

## 2021-04-28 RX ADMIN — BUPROPION HYDROCHLORIDE 150 MG: 150 TABLET, EXTENDED RELEASE ORAL at 07:49

## 2021-04-28 RX ADMIN — LABETALOL HYDROCHLORIDE 200 MG: 200 TABLET, FILM COATED ORAL at 18:19

## 2021-04-28 RX ADMIN — CALCIUM CARBONATE (ANTACID) CHEW TAB 500 MG 400 MG: 500 CHEW TAB at 22:22

## 2021-04-28 RX ADMIN — Medication 10 ML: at 18:20

## 2021-04-28 RX ADMIN — CALCIUM CARBONATE (ANTACID) CHEW TAB 500 MG 400 MG: 500 CHEW TAB at 00:29

## 2021-04-28 NOTE — PROGRESS NOTES
Ante Partum Progress Note    Deshawn Menendez  34w1d  LOS: 0      Patient states she has no new complaints. FM fair/improved. Mild HA on and off. No vision changes/cp/sob/vision changes  No RUQ pain but some GERD sx. Vitals:  Patient Vitals for the past 24 hrs:   Temp Pulse Resp BP SpO2   21 1221 -- 64 14 127/76 99 %   21 0746 98.8 °F (37.1 °C) 74 14 (!) 152/93 99 %   21 0030 98.1 °F (36.7 °C) 78 14 (!) 148/83 --   21 1932 98.6 °F (37 °C) 80 14 137/88 --   21 1756 -- 75 -- (!) 141/80 --       Temp (24hrs), Av.5 °F (36.9 °C), Min:98.1 °F (36.7 °C), Max:98.8 °F (37.1 °C)        Last 24hr Input/Output:  No intake or output data in the 24 hours ending 21 1257   ]    Exam:    General: Patient without distress. Abdomen: soft, non-tender  Fundus: soft, gravid, non-tender  Extremities: no redness or tenderness or cords           Labs:   Recent Results (from the past 24 hour(s))   CBC WITH AUTOMATED DIFF    Collection Time: 21  3:55 AM   Result Value Ref Range    WBC 19.2 (H) 3.6 - 11.0 K/uL    RBC 3.59 (L) 3.80 - 5.20 M/uL    HGB 10.8 (L) 11.5 - 16.0 g/dL    HCT 31.8 (L) 35.0 - 47.0 %    MCV 88.6 80.0 - 99.0 FL    MCH 30.1 26.0 - 34.0 PG    MCHC 34.0 30.0 - 36.5 g/dL    RDW 13.6 11.5 - 14.5 %    PLATELET 037 968 - 635 K/uL    MPV 13.0 (H) 8.9 - 12.9 FL    NRBC 0.0 0  WBC    ABSOLUTE NRBC 0.00 0.00 - 0.01 K/uL    NEUTROPHILS 84 (H) 32 - 75 %    BAND NEUTROPHILS 1 0 - 6 %    LYMPHOCYTES 8 (L) 12 - 49 %    MONOCYTES 6 5 - 13 %    EOSINOPHILS 0 0 - 7 %    BASOPHILS 0 0 - 1 %    METAMYELOCYTES 1 (H) 0 %    IMMATURE GRANULOCYTES 0 %    ABS. NEUTROPHILS 16.3 (H) 1.8 - 8.0 K/UL    ABS. LYMPHOCYTES 1.5 0.8 - 3.5 K/UL    ABS. MONOCYTES 1.2 (H) 0.0 - 1.0 K/UL    ABS. EOSINOPHILS 0.0 0.0 - 0.4 K/UL    ABS. BASOPHILS 0.0 0.0 - 0.1 K/UL    ABS. IMM.  GRANS. 0.0 K/UL    DF MANUAL      RBC COMMENTS NORMOCYTIC, NORMOCHROMIC     METABOLIC PANEL, COMPREHENSIVE    Collection Time: 21 3:55 AM   Result Value Ref Range    Sodium 137 136 - 145 mmol/L    Potassium 4.6 3.5 - 5.1 mmol/L    Chloride 107 97 - 108 mmol/L    CO2 22 21 - 32 mmol/L    Anion gap 8 5 - 15 mmol/L    Glucose 126 (H) 65 - 100 mg/dL    BUN 11 6 - 20 MG/DL    Creatinine 0.49 (L) 0.55 - 1.02 MG/DL    BUN/Creatinine ratio 22 (H) 12 - 20      GFR est AA >60 >60 ml/min/1.73m2    GFR est non-AA >60 >60 ml/min/1.73m2    Calcium 8.9 8.5 - 10.1 MG/DL    Bilirubin, total 0.2 0.2 - 1.0 MG/DL    ALT (SGPT) 23 12 - 78 U/L    AST (SGOT) 10 (L) 15 - 37 U/L    Alk. phosphatase 155 (H) 45 - 117 U/L    Protein, total 5.8 (L) 6.4 - 8.2 g/dL    Albumin 2.8 (L) 3.5 - 5.0 g/dL    Globulin 3.0 2.0 - 4.0 g/dL    A-G Ratio 0.9 (L) 1.1 - 2.2           Assessment:   27 y.o.  34w1d   SIPIH  Sp bmz x2 ,   Labs stable 2021     Plan:  Continue hospitalization with hospitalized bedrest    Farzaneh Akers MD    NST Inpatient Procedure Note    Kimberlyn Coulter presents for fetal non-stress test.    Indication is SIPIH. She is 34w1d. She has been monitored for more than 30 minutes. The FHR was reactive. NST Interpretation:   FHR baseline 130 bpm,   Variability:  moderate  Accelerations:  present  Decelerations Variable: mild. To 100s < 10-15 seconds  Uterine contractions:  Irritability     Assessment  NST is reactive. NST is reassuring. Patient does need admission/observation for further monitoring. Kathleen Harmon was informed of the NST results and her questions were answered. Plan:    [x] Continue admission on Labor and Delivery    [] Continue observation on Labor and Delivery   [] Keep routine OB follow up upon discharge   [] Reviewed fetal movement kick counts, notify MD if decreased   [] Continue continuous fetal monitoring.    []          I spent 15 minutes of face to face time counseling and discussing [address], plan of care with the patient, reviewing labs from today, reviewing nst/bp   More than 50 % of her visit was spent performing counseling.

## 2021-04-28 NOTE — PROGRESS NOTES
Sudhakar NDIAYE, Dr. Sintia Wiseman. Will aim for IOL 37 wks if remains stable and does not have recurrent severe BP requiring more meds or s/sx HELLP. Patient failed outpt management: will continue inpt care and close observation.     Carly Bryan MD

## 2021-04-28 NOTE — PROGRESS NOTES
0720: Verbal shift change report given to Henrique Holloway RN (oncoming nurse) by Patsy Tran RN (offgoing nurse). Report included the following information SBAR, MAR and Recent Results. 6510: This RN at bedside to administer additional morning PO medications, see MAR. Pt sitting upright in bed eating breakfast tray. Bed locked and lowered, call bell within reach. 1145: Patient provided with fresh ice water and new menu. 1147: This RN placing patient on EFM for NST. Patient resting in position of comfort in locked and lowered bed, call bell within reach. 1212: NST reactive; verified by Rodrick Lee RN. 1215: EFM FHR tracing reviewed by this RN and Bharath Mccullough. EFM tracing also reviewed by Roni Naylor MD.    5443: This RN at bedside to take patient off of EFM. Patient reports recently ordering meal tray. 1600: This RN at bedside to perform re-assessment. Patient provided with fresh ice water and gingerale. 1705: Luis ANGLIN at bedside to speak with patient about plan of care. Patient to stay on until until 37w to deliver per SENTHIL ANGLIN.    1730: FOB at bedside. 1920: Verbal shift change report given to Cassandra Rangel RN (oncoming nurse) by Henrique Holloway RN (offgoing nurse). Report included the following information SBAR, MAR and Recent Results.

## 2021-04-28 NOTE — PROGRESS NOTES
1915: Bedside and Verbal shift change report given to KATE Hathaway RN (oncoming nurse) by Zari Kirkland RN (offgoing nurse). Report included the following information SBAR, Kardex, Procedure Summary, Intake/Output, MAR and Recent Results. 0330: This Rn reassessing pt at this time. Pt denies any needs at this time. 0700: Bedside and Verbal shift change report given to BREANA Callejas (oncoming nurse) by KATE Hathaway RN (offgoing nurse). Report included the following information SBAR, Kardex, Procedure Summary, Intake/Output, MAR and Recent Results.

## 2021-04-29 PROCEDURE — 65270000029 HC RM PRIVATE

## 2021-04-29 PROCEDURE — 76819 FETAL BIOPHYS PROFIL W/O NST: CPT | Performed by: OBSTETRICS & GYNECOLOGY

## 2021-04-29 PROCEDURE — 74011250637 HC RX REV CODE- 250/637: Performed by: OBSTETRICS & GYNECOLOGY

## 2021-04-29 RX ORDER — FAMOTIDINE 20 MG/1
20 TABLET, FILM COATED ORAL
Status: DISCONTINUED | OUTPATIENT
Start: 2021-04-29 | End: 2021-05-21 | Stop reason: HOSPADM

## 2021-04-29 RX ADMIN — Medication 10 ML: at 15:45

## 2021-04-29 RX ADMIN — CALCIUM CARBONATE (ANTACID) CHEW TAB 500 MG 400 MG: 500 CHEW TAB at 15:45

## 2021-04-29 RX ADMIN — Medication 81 MG: at 08:07

## 2021-04-29 RX ADMIN — BUPROPION HYDROCHLORIDE 150 MG: 150 TABLET, EXTENDED RELEASE ORAL at 08:07

## 2021-04-29 RX ADMIN — LABETALOL HYDROCHLORIDE 200 MG: 200 TABLET, FILM COATED ORAL at 19:18

## 2021-04-29 RX ADMIN — ACETAMINOPHEN 650 MG: 325 TABLET ORAL at 09:53

## 2021-04-29 RX ADMIN — FAMOTIDINE 20 MG: 20 TABLET, FILM COATED ORAL at 19:26

## 2021-04-29 RX ADMIN — Medication 1 TABLET: at 08:06

## 2021-04-29 RX ADMIN — LABETALOL HYDROCHLORIDE 200 MG: 200 TABLET, FILM COATED ORAL at 08:07

## 2021-04-29 NOTE — PROGRESS NOTES
Indication: Hypertension, Pre-existingw/pre-eclampsia 3rd Tri O11.3, Decreased Fetal Movement 3rd Tri  O36.8130.   ____________________________________________________________________________  History: Age: 27 years. : 1 Para: 0.   Current Pregnancy: Blood group: A Rhesus D-positive. Pre- pregnancy data: Weight 180 lbs. Height 5 ft 5 ins. BMI 30.0.  ____________________________________________________________________________  Dating:  LMP: 20 EDC: 21 GA by LMP: 34w2d  Current Scan on: 21 EDC: 21 GA by current scan: 34w2d  Best Overall Assessment: 21 EDC: 21 Assessed GA: 34w2d  The calculation of the gestational age by current scan was based on BPD, HC, AC and FL. The Best Overall Assessment is based on the LMP.  ____________________________________________________________________________  Anatomy Scan:  Espinoza gestation. Biometry:  BPD 86.2 mm  - 34w5d (33w5d to 35w6d)  .6 mm  - 34w2d (31w2d to 37w2d)  .2 mm  - 34w1d (33w1d to 35w0d)  FL 65.6 mm  - 33w6d (30w6d to 36w5d)  .2 mm  - 31w5d  EFW (lbs/oz) 5 lbs 3 ozs  EFW (g) 2348 g  41st%     Fetal heart activity: present. Fetal heart rate: 138 bpm.   Fetal presentation: cephalic. Placenta: anterior. Fetal Anatomy:  Visualized with normal appearance: gastrointestinal tract, bladder. Summary of Ultrasound Findings:  Transabdominal US. U/S machine: sabio labs E8 Expert.     ____________________________________________________________________________  Fetal Wellbeing Assessment:  Amniotic fluid: normal. ADOLFO: 15.6 cm. MVP: 7.2 cm. Q1: 7.2 cm. Q2: 2.9 cm. Q3: 1.1 cm. Q4: 4.4 cm. Biophysical Profile: Fetal body movements: normal (2), Fetal tone: normal (2), Fetal breathing movements: normal (2), Amniotic fluid volume: normal (2). Score 8 / 8. Summary: Reassuring fetal status. ____________________________________________________________________________  Report Summary:  Impression:  This is a remote read:    Ms. Dayne Moe is admitted for gestational hypertension. Her working diagnosis is non-severe gestational hypertension, but we are monitoring closely as she has had a few severe range blood pressures. Her BP is well controlled with a very small increase in labetalol 100 bid to 200 bid, so it seems reasonable to keep her pregnant with very close monitoring, particularly if inpatient, up to 37w0. Today ADOLFO is normal and BPP is 8/8. Reassuring fetal surveillance. Recommendations: Cont weekly BPP. Delivery is recommended no later than 37w0. If there are any additional severe range blood pressures of signs/symptoms consistent with severe disease, I recommend delivery.

## 2021-04-29 NOTE — PROGRESS NOTES
Ante Partum Progress Note    Rosie Manzo  34w2d  LOS: 1      Patient states she does have mild headache  and does not have contractions, right upper quadrant pain  , vaginal bleeding  and vaginal leaking of fluid   She reports FM \"OK\" but subjectively decreased for several days. Had back/shoulder pain resolved. Right sided pain resolved. Vitals:  Patient Vitals for the past 24 hrs:   Temp Pulse Resp BP SpO2   21 0806 -- 75 -- (!) 136/99 --   21 0341 98.4 °F (36.9 °C) 81 14 138/75 --   21 2222 -- 75 -- (!) 148/95 --   21 1942 98.1 °F (36.7 °C) 76 14 (!) 143/85 --   21 1613 98.5 °F (36.9 °C) 80 14 (!) 142/70 99 %       Temp (24hrs), Av.3 °F (36.8 °C), Min:98.1 °F (36.7 °C), Max:98.5 °F (36.9 °C)        Last 24hr Input/Output:  No intake or output data in the 24 hours ending 21 1449   ]    Exam:    General: Patient without distress. Chest  · Respiratory Effort: breathing normal        Auscultation: normal breath sounds, clear bilaterally    Cardiovascular  · Heart:  · Auscultation: regular rate and rhythm without murmur, normal S1, S2      Abdomen: soft, non-tender  Fundus: soft, gravid, non-tender  Extremities: no redness or tenderness or cords               Labs:   No results found for this or any previous visit (from the past 24 hour(s)). Assessment:   27 y.o.  34w2d   SIPIH, with treatable ranges in past, but stable  Sp bmz x2  Labetalol 200mg bid, increased from 100mg bid  Decreased FM: with delay in reactive NST today     Plan:  Continue hospitalization with hospitalized bedrest  IOL at 37 wks if remains stable  Will continue inpatient monitoring as has failed outpt monitoring with need for readmission     Laila Godinez MD    NST Inpatient Procedure Note    Rosie Manzo presents for fetal non-stress test.    Indication is [facility]    She is 34w2d. She has been monitored for more than 60 minutes. The FHR was reactive.     NST Interpretation:   FHR baseline 130 bpm,   Variability:  moderate  Accelerations:  present  Decelerations Absent. Uterine contractions:  absent    Assessment  NST is reactive. NST is reassuring. Patient does need admission/observation for further monitoring. Cris Miles was informed of the NST results and her questions were answered. Plan:    [] Continue admission on Labor and Delivery    [] Continue observation on Labor and Delivery   [] Keep routine OB follow up upon discharge   [] Reviewed fetal movement kick counts, notify MD if decreased   [] Continue continuous fetal monitoring. []   I spent 15 minutes of face to face time counseling and discussing bp, nst, mfm consult with the patient. More than 50 % of her visit was spent performing counseling.

## 2021-04-29 NOTE — PROGRESS NOTES
4/29/2021  8:11 AM    MOB is 34w1d, admitted for elevated blood pressure . Plan of care: IOL 37 wks if remains stable and does not have recurrent severe BP. CM following for needs.     Raina Zuniga

## 2021-04-29 NOTE — PROGRESS NOTES
0700: Bedside and Verbal shift change report given to BREANA Pearl RN (oncoming nurse) by KATE Barrios RN (offgoing nurse). Report included the following information SBAR, Kardex, Intake/Output, MAR, Recent Results, Med Rec Status and Quality Measures. 1135: Perfect serve sent to Dr. René Jarrell regarding inability to obtain reactive NST. Possible variable on fetal strip. Difficulty monitoring FHR. Pt turned to R and L side and given juice. 1147: Dr. René Jarrell called back to nurse's station. Per MD- keep on continuous monitoring. [facility] called to schedule appointment for today per MD d/t decreased FM and superimposed pre-e. Per reception, RN will call back to unit    [phone]: [facility] office called again. Per reception, pt should be seen within the hour. 1307: Pt taken via wheelchair to [facility] at this time. 1345: Pt brought back to  via wheelchair from [facility]. Rounding on pt. Pt placed back on EFM/TOCO. 1545: Pt given TUMS at this time by DIANNE Clark RN, while this RN at bedside with another pt. VSS. Pt placed back on monitor. 1606: EFM/TOCO removed at this time per MD VORB. Dr. René Jarrell verified that [address] tracing was reactive, okay to resume NST BID.     1900: Bedside and Verbal shift change report given to BREANA Mckee RN (oncoming nurse) by Ambrosio Callahan RN (offgoing nurse). Report included the following information SBAR, Kardex, Intake/Output, MAR, Recent Results and Quality Measures. Gretta Cuellar

## 2021-04-30 PROCEDURE — 59025 FETAL NON-STRESS TEST: CPT | Performed by: OBSTETRICS & GYNECOLOGY

## 2021-04-30 PROCEDURE — 99231 SBSQ HOSP IP/OBS SF/LOW 25: CPT | Performed by: OBSTETRICS & GYNECOLOGY

## 2021-04-30 PROCEDURE — 74011250637 HC RX REV CODE- 250/637: Performed by: OBSTETRICS & GYNECOLOGY

## 2021-04-30 PROCEDURE — 74011250636 HC RX REV CODE- 250/636: Performed by: OBSTETRICS & GYNECOLOGY

## 2021-04-30 PROCEDURE — 65270000029 HC RM PRIVATE

## 2021-04-30 RX ADMIN — ACETAMINOPHEN 650 MG: 325 TABLET ORAL at 15:13

## 2021-04-30 RX ADMIN — Medication 81 MG: at 08:56

## 2021-04-30 RX ADMIN — LABETALOL HYDROCHLORIDE 200 MG: 200 TABLET, FILM COATED ORAL at 08:56

## 2021-04-30 RX ADMIN — SODIUM CHLORIDE, POTASSIUM CHLORIDE, SODIUM LACTATE AND CALCIUM CHLORIDE 125 ML/HR: 600; 310; 30; 20 INJECTION, SOLUTION INTRAVENOUS at 17:21

## 2021-04-30 RX ADMIN — BUPROPION HYDROCHLORIDE 150 MG: 150 TABLET, EXTENDED RELEASE ORAL at 08:56

## 2021-04-30 RX ADMIN — Medication 10 ML: at 17:21

## 2021-04-30 RX ADMIN — Medication 1 TABLET: at 08:56

## 2021-04-30 RX ADMIN — Medication 10 ML: at 08:59

## 2021-04-30 RX ADMIN — LABETALOL HYDROCHLORIDE 200 MG: 200 TABLET, FILM COATED ORAL at 17:11

## 2021-04-30 NOTE — PROGRESS NOTES
AntePartum High Risk Pregnancy Note    Alanis Washington  85W0W    Patient admitted for [facility] 34w3d Estimated Date of Delivery: 21 states she does not  have  headache, blurred vision, sob, or CP. Sindi Profit Vitals:    Patient Vitals for the past 24 hrs:   BP Temp Pulse Resp SpO2   21 0801 (!) 144/89 98.7 °F (37.1 °C) 66 16 --   21 0454 130/76 98 °F (36.7 °C) 70 16 --   21 2355 130/82 -- 61 -- (!) 83 %   21 1918 116/67 98 °F (36.7 °C) 77 16 --   21 1549 (!) 142/83 -- 75 -- 100 %   21 1548 (!) 142/83 98.2 °F (36.8 °C) 76 14 100 %     Temp (24hrs), Av.2 °F (36.8 °C), Min:98 °F (36.7 °C), Max:98.7 °F (37.1 °C)    I&O:   No intake/output data recorded. No intake/output data recorded. Exam:  Patient without distress. Abdomen soft, non-tender               Fundus soft and non tender               Uterine Activity: irregular               NST:  Reactive, baseline 150, moderate variability, + accels, no decels, contractions q 3-8 minutes, monitoroed > 30  minutes    Assessment and Plan:     27 y.o.   at [address] [place] [address]- BP's stable, Sp bmz x2 ,   -continue labetalol 200 mg bid  -plan delivery at 37 weeks or if persistent severe range BPs.

## 2021-04-30 NOTE — PROGRESS NOTES
1705 Patient BP elevated. Discussed plan of care with Dr Sabino Lombard. RN received orders to give oral Labetalol 200 mg first and continue to monitor BP. Update MD if BP does not resolve. 1913 Bedside and Verbal shift change report given to Pricilla Walsh (oncoming nurse) by Darron Hirsch RN (offgoing nurse). Report included the following information SBAR, Kardex and MAR.

## 2021-04-30 NOTE — PROGRESS NOTES
1900- Assumed care of patient. Pt resting in bed without complaints. VSS. 1930- Pt on monitor for NST.     2200- Pt sleeping. 0000- Pt resting in bed. VSS. No needs. 0155- Pt sleeping.

## 2021-04-30 NOTE — PROGRESS NOTES
1913  Bedside and Verbal shift change report given to CHONG Hardwick RN (oncoming nurse) by Derick Roberts RN (offgoing nurse). Report included the following information SBAR, Kardex, MAR, Accordion, Recent Results and Med Rec Status. 1945  Patient assessment completed. Patient and spouse are eating dinner. EFM and TOCO placed. Patient is reporting contractions, felt on the lower abdomen,that are not as painful as on Monday. \"  Patient denies any bleeding, or any change in her condition. Patient teaching regarding SCD provided. 2048  Patient ambulates to bathroom. 2055  Patient returns from bathroom, and is reporting thin clear vaginal discharge that made her change her underwear. 2128  Nitrazine, 6.5  Dr Lolis Tucker notified. Per Dr. Lolis Tucker, check with Amnisure. [id number]  Amnisure check, Negative  2125  Dr Lolis Tucker is at bedside assessing patient progress. 2129  SVE Closed/80/-3  2200  Peripheral IV infiltrated. 2205  IV replaced, LR infusing  0156  Reassessment. Patient ambulates to bathroom. 0710  Bedside and Verbal shift change report given to 2020 [facility] Yadira (oncoming nurse) by Carmencita Farrell RN (offgoing nurse). Report included the following information SBAR, Kardex, MAR, Accordion, Recent Results and Med Rec Status.

## 2021-04-30 NOTE — PROGRESS NOTES
Problem: Hypertensive Disorders of Pregnancy Care Plan (Gestational HTN, Preeclampsia, HELLP syndrome, Eclampsia, Chronic HTN, Superimposed Preeclamsia)  Goal: *Reassuring fetal surveillance  Outcome: Progressing Towards Goal

## 2021-05-01 LAB
A1 MICROGLOB PLACENTAL VAG QL: NEGATIVE
CONTROL LINE PRESENT?: NORMAL
DAILY QC (YES/NO)?: YES
EXPIRATION DATE: NORMAL
INTERNAL NEGATIVE CONTROL: NORMAL
KIT LOT NO.: NORMAL
PH, VAGINAL FLUID: 6.5 (ref 5–6.1)

## 2021-05-01 PROCEDURE — 59025 FETAL NON-STRESS TEST: CPT

## 2021-05-01 PROCEDURE — 74011250637 HC RX REV CODE- 250/637: Performed by: OBSTETRICS & GYNECOLOGY

## 2021-05-01 PROCEDURE — 84112 EVAL AMNIOTIC FLUID PROTEIN: CPT | Performed by: OBSTETRICS & GYNECOLOGY

## 2021-05-01 PROCEDURE — 74011250636 HC RX REV CODE- 250/636: Performed by: OBSTETRICS & GYNECOLOGY

## 2021-05-01 PROCEDURE — 99231 SBSQ HOSP IP/OBS SF/LOW 25: CPT | Performed by: OBSTETRICS & GYNECOLOGY

## 2021-05-01 PROCEDURE — 83986 ASSAY PH BODY FLUID NOS: CPT | Performed by: OBSTETRICS & GYNECOLOGY

## 2021-05-01 PROCEDURE — 65270000029 HC RM PRIVATE

## 2021-05-01 PROCEDURE — 99282 EMERGENCY DEPT VISIT SF MDM: CPT

## 2021-05-01 RX ADMIN — SODIUM CHLORIDE, POTASSIUM CHLORIDE, SODIUM LACTATE AND CALCIUM CHLORIDE 125 ML/HR: 600; 310; 30; 20 INJECTION, SOLUTION INTRAVENOUS at 12:33

## 2021-05-01 RX ADMIN — CALCIUM CARBONATE (ANTACID) CHEW TAB 500 MG 400 MG: 500 CHEW TAB at 19:23

## 2021-05-01 RX ADMIN — BUPROPION HYDROCHLORIDE 150 MG: 150 TABLET, EXTENDED RELEASE ORAL at 08:47

## 2021-05-01 RX ADMIN — LABETALOL HYDROCHLORIDE 200 MG: 200 TABLET, FILM COATED ORAL at 08:47

## 2021-05-01 RX ADMIN — ACETAMINOPHEN 650 MG: 325 TABLET ORAL at 19:22

## 2021-05-01 RX ADMIN — Medication 1 TABLET: at 08:47

## 2021-05-01 RX ADMIN — LABETALOL HYDROCHLORIDE 200 MG: 200 TABLET, FILM COATED ORAL at 18:03

## 2021-05-01 RX ADMIN — Medication 81 MG: at 08:47

## 2021-05-01 RX ADMIN — SODIUM CHLORIDE, POTASSIUM CHLORIDE, SODIUM LACTATE AND CALCIUM CHLORIDE 125 ML/HR: 600; 310; 30; 20 INJECTION, SOLUTION INTRAVENOUS at 06:28

## 2021-05-01 NOTE — PROGRESS NOTES
Called to room for report of gush of fluid while in bathroom. Nitrazine equivocal.  Pt has been c/o contractions since Monday, but has not been examined since that time. 133/81  SVE: cl/80/-3  Amnisure negative  Physiologic discharge noted    FHT: 130 reactive  Puhi: irritable, Q3-7    A/ 34 + week pre eclamptic, no e/o rupture or labor.   BP stable    P/ continue expectant management

## 2021-05-01 NOTE — PROGRESS NOTES
1910  Bedside and Verbal shift change report given to CHONG Hardwick RN (oncoming nurse) by Edilberto Nicholas RN (offgoing nurse). Report included the following information SBAR, Kardex, MAR, Accordion, Recent Results and Med Rec Status. Patient assessment completed. Teaching provided about pulmonary toilet. IS given and patient thought how to use it every 1-2 hours as awake. Deep breathing and coughing encouraged, as patient is on bedrest.  TOCO and EFM placed for NST. 2007  Reactive NST recorded. 3193  Bedside and Verbal shift change report given to Edilberto Nicholas RN RN (oncoming nurse) by Wei Jones RN (offgoing nurse). Report included the following information SBAR, Kardex, Intake/Output, Accordion, Recent Results and Med Rec Status.

## 2021-05-01 NOTE — PROGRESS NOTES
Ante Partum Progress Note    Marco Phy  34w4d        Patient states she does have normal fetal movement Had gush last night seen and ruled out for PPROM  Denies headache or BV    Vitals:  Visit Vitals  BP (!) 144/92   Pulse 67   Temp 98.2 °F (36.8 °C)   Resp 16   Ht 5' 5\" (1.651 m)   Wt 207 lb (93.9 kg)   LMP 2020   SpO2 100%   BMI 34.45 kg/m²     Temp (24hrs), Av.5 °F (36.9 °C), Min:98.2 °F (36.8 °C), Max:98.9 °F (37.2 °C)      Last 24hr Input/Output:  No intake or output data in the 24 hours ending 21 6739     Non stress test:  Reactive    Uterine Activity: None     Exam:  Patient without distress.      Abdomen, fundus soft non-tender     Extremities, no redness or tenderness               Additional Exam: Deferred    Labs:     Lab Results   Component Value Date/Time    WBC 19.2 (H) 2021 03:55 AM    WBC 13.4 (H) 2021 09:41 AM    WBC 12.5 (H) 2021 06:51 PM    WBC 11.2 (H) 04/15/2021 02:50 PM    WBC 11.3 (H) 03/10/2021 10:53 AM    WBC 9.1 2020 12:00 AM    WBC 15.1 (H) 2020 11:22 AM    WBC 7.4 2016 09:55 AM    WBC 9.2 2016 03:36 AM    WBC 15.7 (H) 2016 05:17 AM    WBC 19.1 (H) 2016 05:50 AM    WBC 20.1 (H) 2016 06:00 PM    WBC 20.2 (H) 2016 03:42 PM    WBC 6.4 2014 08:53 AM    HGB 10.8 (L) 2021 03:55 AM    HGB 11.3 (L) 2021 09:41 AM    HGB 10.8 (L) 2021 06:51 PM    HGB 10.4 (L) 04/15/2021 02:50 PM    HGB 10.4 (L) 03/10/2021 10:53 AM    HGB 11.2 2020 12:00 AM    HGB 13.3 2020 11:22 AM    HGB 12.3 2016 09:55 AM    HGB 10.1 (L) 2016 03:36 AM    HGB 10.7 (L) 2016 05:17 AM    HGB 11.3 (L) 2016 05:50 AM    HGB 13.2 2016 06:00 PM    HGB 13.6 2016 03:42 PM    HGB 13.0 2014 08:53 AM    HCT 31.8 (L) 2021 03:55 AM    HCT 33.5 (L) 2021 09:41 AM    HCT 32.1 (L) 2021 06:51 PM    HCT 32.2 (L) 04/15/2021 02:50 PM    HCT 31.7 (L) 03/10/2021 10:53 AM    HCT 33.9 (L) 11/06/2020 12:00 AM    HCT 39.2 04/26/2020 11:22 AM    HCT 36.9 06/02/2016 09:55 AM    HCT 30.4 (L) 01/07/2016 03:36 AM    HCT 32.2 (L) 01/06/2016 05:17 AM    HCT 34.0 (L) 01/05/2016 05:50 AM    HCT 38.9 01/04/2016 06:00 PM    HCT 39.7 01/04/2016 03:42 PM    HCT 39.8 12/11/2014 08:53 AM    PLATELET 737 87/56/6638 03:55 AM    PLATELET 352 31/73/7494 09:41 AM    PLATELET 513 48/06/0305 06:51 PM    PLATELET 530 07/49/8547 02:50 PM    PLATELET 349 23/07/9255 10:53 AM    PLATELET 834 62/95/9940 12:00 AM    PLATELET 376 08/98/5945 11:22 AM    PLATELET 399 58/82/9410 09:55 AM    PLATELET 629 45/09/0858 03:36 AM    PLATELET 981 94/63/4754 05:17 AM    PLATELET 471 21/20/3338 05:50 AM    PLATELET 542 64/48/8146 06:00 PM    PLATELET 081 72/83/0838 03:42 PM    PLATELET 272 00/78/4745 08:53 AM    Hgb, External 11.2 11/06/2020    Hct, External 33.9 11/06/2020    Platelet cnt., External 230 11/06/2020       Recent Results (from the past 24 hour(s))   RUPTURE OF FETAL MEMBRANES, POC    Collection Time: 04/30/21  9:20 PM   Result Value Ref Range    Rupture of fetal membrane Negative Negative    Control line present? Acceptable     Internal negative control Acceptable     Kit Lot No. [id number]     Expiration date 12/20/23    PH BY NITRAZINE, POC    Collection Time: 04/30/21  9:28 PM   Result Value Ref Range    pH-Nitrazine paper 6.5 (A) 5.0 - 6.1    Daily QC performed?  Yes        Assessment: 34w4d   SIPIH stable on labetalol  S/p BMZ x 2    Plan:  Cont current managment

## 2021-05-01 NOTE — PROGRESS NOTES
Problem: Patient Education: Go to Patient Education Activity  Goal: Patient/Family Education  Outcome: Progressing Towards Goal     Problem: Hypertensive Disorders of Pregnancy Care Plan (Gestational HTN, Preeclampsia, HELLP syndrome, Eclampsia, Chronic HTN, Superimposed Preeclamsia)  Goal: *Reassuring fetal surveillance  Outcome: Progressing Towards Goal

## 2021-05-02 PROCEDURE — 74011250637 HC RX REV CODE- 250/637: Performed by: OBSTETRICS & GYNECOLOGY

## 2021-05-02 PROCEDURE — 65270000029 HC RM PRIVATE

## 2021-05-02 RX ADMIN — LABETALOL HYDROCHLORIDE 200 MG: 200 TABLET, FILM COATED ORAL at 08:34

## 2021-05-02 RX ADMIN — CALCIUM CARBONATE (ANTACID) CHEW TAB 500 MG 400 MG: 500 CHEW TAB at 20:22

## 2021-05-02 RX ADMIN — BUPROPION HYDROCHLORIDE 150 MG: 150 TABLET, EXTENDED RELEASE ORAL at 08:34

## 2021-05-02 RX ADMIN — ACETAMINOPHEN 650 MG: 325 TABLET ORAL at 20:23

## 2021-05-02 RX ADMIN — Medication 10 ML: at 14:51

## 2021-05-02 RX ADMIN — Medication 81 MG: at 08:34

## 2021-05-02 RX ADMIN — LABETALOL HYDROCHLORIDE 200 MG: 200 TABLET, FILM COATED ORAL at 18:14

## 2021-05-02 RX ADMIN — ACETAMINOPHEN 650 MG: 325 TABLET ORAL at 14:49

## 2021-05-02 RX ADMIN — Medication 1 TABLET: at 08:34

## 2021-05-02 NOTE — PROGRESS NOTES
1905  Bedside and Verbal shift change report given to CHONG Hardwick RN (oncoming nurse) by María Serrano RN (offgoing nurse). Report included the following information SBAR, Kardex, MAR, Accordion, Recent Results and Med Rec Status. Patient is in the shower. 1948  Patient assessment. EFM and TOCO placed. 2018  Reactive NST recorded. 0702  Bedside and Verbal shift change report given to Marcia Carey RN (oncoming nurse) by Sabiha Morrison RN (offgoing nurse). Report included the following information SBAR, Kardex, MAR, Accordion, Recent Results and Med Rec Status.

## 2021-05-02 NOTE — PROGRESS NOTES
1100 Discussed plan of care with Dr Emmanuel Lomas. IV can be saline locked and patient can have wheelchair privileges. RN discussed being off the unit for a hour at at time before checking in with the nurse. Patient agree to the plan. 1140 Patient off unit in wheel chair pushed by . 1210 Patient return. 1904 Bedside and Verbal shift change report given to Modesta Sawyer RN (oncoming nurse) by Kasi Schneider RN (offgoing nurse). Report included the following information SBAR, Kardex and MAR.

## 2021-05-02 NOTE — PROGRESS NOTES
Problem: Falls - Risk of  Goal: *Absence of Falls  Description: Document Bladimir Martinez Fall Risk and appropriate interventions in the flowsheet.   Outcome: Progressing Towards Goal  Note: Fall Risk Interventions:            Medication Interventions: Teach patient to arise slowly                   Problem: Patient Education: Go to Patient Education Activity  Goal: Patient/Family Education  Outcome: Progressing Towards Goal     Problem: Hypertensive Disorders of Pregnancy Care Plan (Gestational HTN, Preeclampsia, HELLP syndrome, Eclampsia, Chronic HTN, Superimposed Preeclamsia)  Goal: *Blood pressure within specified parameters  Outcome: Progressing Towards Goal

## 2021-05-03 LAB
ALBUMIN SERPL-MCNC: 2.4 G/DL (ref 3.5–5)
ALBUMIN/GLOB SERPL: 0.8 {RATIO} (ref 1.1–2.2)
ALP SERPL-CCNC: 144 U/L (ref 45–117)
ALT SERPL-CCNC: 15 U/L (ref 12–78)
ANION GAP SERPL CALC-SCNC: 4 MMOL/L (ref 5–15)
AST SERPL-CCNC: 15 U/L (ref 15–37)
BASOPHILS # BLD: 0 K/UL (ref 0–0.1)
BASOPHILS NFR BLD: 0 % (ref 0–1)
BILIRUB SERPL-MCNC: 0.3 MG/DL (ref 0.2–1)
BUN SERPL-MCNC: 9 MG/DL (ref 6–20)
BUN/CREAT SERPL: 16 (ref 12–20)
CALCIUM SERPL-MCNC: 8.3 MG/DL (ref 8.5–10.1)
CHLORIDE SERPL-SCNC: 108 MMOL/L (ref 97–108)
CO2 SERPL-SCNC: 25 MMOL/L (ref 21–32)
CREAT SERPL-MCNC: 0.56 MG/DL (ref 0.55–1.02)
DIFFERENTIAL METHOD BLD: ABNORMAL
EOSINOPHIL # BLD: 0.3 K/UL (ref 0–0.4)
EOSINOPHIL NFR BLD: 2 % (ref 0–7)
ERYTHROCYTE [DISTWIDTH] IN BLOOD BY AUTOMATED COUNT: 13.6 % (ref 11.5–14.5)
GLOBULIN SER CALC-MCNC: 3 G/DL (ref 2–4)
GLUCOSE SERPL-MCNC: 87 MG/DL (ref 65–100)
HCT VFR BLD AUTO: 33.5 % (ref 35–47)
HGB BLD-MCNC: 11.2 G/DL (ref 11.5–16)
IMM GRANULOCYTES # BLD AUTO: 0 K/UL
IMM GRANULOCYTES NFR BLD AUTO: 0 %
LYMPHOCYTES # BLD: 2.7 K/UL (ref 0.8–3.5)
LYMPHOCYTES NFR BLD: 20 % (ref 12–49)
MCH RBC QN AUTO: 29.6 PG (ref 26–34)
MCHC RBC AUTO-ENTMCNC: 33.4 G/DL (ref 30–36.5)
MCV RBC AUTO: 88.4 FL (ref 80–99)
MONOCYTES # BLD: 0.5 K/UL (ref 0–1)
MONOCYTES NFR BLD: 4 % (ref 5–13)
MYELOCYTES NFR BLD MANUAL: 1 %
NEUTS SEG # BLD: 9.8 K/UL (ref 1.8–8)
NEUTS SEG NFR BLD: 73 % (ref 32–75)
NRBC # BLD: 0 K/UL (ref 0–0.01)
NRBC BLD-RTO: 0 PER 100 WBC
PLATELET # BLD AUTO: 164 K/UL (ref 150–400)
PMV BLD AUTO: 12.9 FL (ref 8.9–12.9)
POTASSIUM SERPL-SCNC: 4.4 MMOL/L (ref 3.5–5.1)
PROT SERPL-MCNC: 5.4 G/DL (ref 6.4–8.2)
RBC # BLD AUTO: 3.79 M/UL (ref 3.8–5.2)
RBC MORPH BLD: ABNORMAL
SODIUM SERPL-SCNC: 137 MMOL/L (ref 136–145)
WBC # BLD AUTO: 13.4 K/UL (ref 3.6–11)

## 2021-05-03 PROCEDURE — 36415 COLL VENOUS BLD VENIPUNCTURE: CPT

## 2021-05-03 PROCEDURE — 76819 FETAL BIOPHYS PROFIL W/O NST: CPT | Performed by: OBSTETRICS & GYNECOLOGY

## 2021-05-03 PROCEDURE — 59025 FETAL NON-STRESS TEST: CPT

## 2021-05-03 PROCEDURE — 85025 COMPLETE CBC W/AUTO DIFF WBC: CPT

## 2021-05-03 PROCEDURE — 99231 SBSQ HOSP IP/OBS SF/LOW 25: CPT | Performed by: OBSTETRICS & GYNECOLOGY

## 2021-05-03 PROCEDURE — 80053 COMPREHEN METABOLIC PANEL: CPT

## 2021-05-03 PROCEDURE — 65270000029 HC RM PRIVATE

## 2021-05-03 PROCEDURE — 74011250637 HC RX REV CODE- 250/637: Performed by: OBSTETRICS & GYNECOLOGY

## 2021-05-03 PROCEDURE — 59025 FETAL NON-STRESS TEST: CPT | Performed by: OBSTETRICS & GYNECOLOGY

## 2021-05-03 RX ADMIN — ACETAMINOPHEN 650 MG: 325 TABLET ORAL at 17:22

## 2021-05-03 RX ADMIN — CALCIUM CARBONATE (ANTACID) CHEW TAB 500 MG 400 MG: 500 CHEW TAB at 20:28

## 2021-05-03 RX ADMIN — Medication 81 MG: at 08:54

## 2021-05-03 RX ADMIN — BUPROPION HYDROCHLORIDE 150 MG: 150 TABLET, EXTENDED RELEASE ORAL at 08:54

## 2021-05-03 RX ADMIN — CALCIUM CARBONATE (ANTACID) CHEW TAB 500 MG 400 MG: 500 CHEW TAB at 15:13

## 2021-05-03 RX ADMIN — LABETALOL HYDROCHLORIDE 200 MG: 200 TABLET, FILM COATED ORAL at 08:54

## 2021-05-03 RX ADMIN — Medication 10 ML: at 20:32

## 2021-05-03 RX ADMIN — ACETAMINOPHEN 650 MG: 325 TABLET ORAL at 10:07

## 2021-05-03 RX ADMIN — Medication 1 TABLET: at 08:53

## 2021-05-03 RX ADMIN — LABETALOL HYDROCHLORIDE 200 MG: 200 TABLET, FILM COATED ORAL at 17:22

## 2021-05-03 NOTE — PROGRESS NOTES
0720: Bedside and Verbal shift change report given to BREANA Maldonado RN (oncoming nurse) by TIESHA Hardwick RN (offgoing nurse). Report included the following information SBAR, Kardex, Procedure Summary, Intake/Output, MAR, Recent Results, Med Rec Status and Quality Measures. 6884: Pt escorted to [facility] by Luiza Kelley RN while this RN in another pt RM.     0845: Pt back on unit after  appt. Pt states that [facility] appt went well. Pt stating she still having HA that is consistent and constant since beginning of 3rd trimester. Pt refusing medication for HA at this time, would like to \"decompress first\" from appt. Pt denies SOB/RUQ pain/vision changes. Morning medications administered at this time. Plan for NST later. 1003: Pt placed on EFM/TOCO at this time. Pt endorses good FM.     1023: NST verified reactive with KATE Acevedo RN. Pt taken off monitor at this time. Pt denies any new needs at this time, pt re-oriented to use call bell if pt ha any questions, concerns or is experiencing any new/abnormal sxs. Pt agreeable. 1330: Rounding on pt at this time. Pt resting in bed,     1600: Rounding on pt at this time. Pt states mild HA, but would like to rest, refusing any pain interventions at this time. 1722: Tylenol 650mg PO given for mild HA.     1805: Pt being transported via wheelchair off unit with . Pt aware of staying within hospital.     1843: Pt arrived back on unit in wheelchair at this time with spouse. 1910:Bedside and Verbal shift change report given to DIANNE Burnett RN (oncoming nurse) by . Monserrat Maldonado RN (offgoing nurse). Report included the following information SBAR, Kardex, MAR, Recent Results, Med Rec Status and Quality Measures.

## 2021-05-03 NOTE — PROGRESS NOTES
1905: Verbal shift change report given to Kika Torres RN (oncoming nurse) by Rossana Madrid RN (offgoing nurse). Report included the following information SBAR, MAR, Accordion and Recent Results. 1947: This RN at bedside to assess patient. Patient resting in position of comfort in locked and lowered bed, Spouse, Valente, at bedside. Patient requesting to be placed on EFM for NST so that \" may hear the baby. \" This RN verbalizing understanding. Patient reports continuation of dull HA. Patient denies visual disturbances, RUQ pain, or extremity swelling. Patient denies worsening of chronic epigastric pain. BP elevated, but not within severe range. Bedside table and water within reach. 2027: This RN at bedside to adjust EFM for NST.    2100: NST reactive, verified by Alexa Ward RN. 2108: This RN at bedside to provide patient with more ice water and informing patient of continued EFM tracing due to uterine irritability and mild contractions. This RN encouraging ample PO hydration. Patient ambulatory to restroom with steady gait. Urine occurrence. Patient ambulatory back to bed. EFM US and TOCO adjusted. 2254: This RN at bedside to disconnect patient from [facility]. Patient reports only feeling \"some\" of her mild, intermittent contractions. This RN obtaining BP. Lights dimmed. Pt reports going to sleep. Patient denies further needs. Bedside table and water within reach. 0001: Patient resting in position of comfort in locked and lowered bed with bilateral eyes closed and symmetrical chest rise. Call bell within reach. 0200: Patient resting in position of comfort in locked and lowered bed with bilateral eyes closed and symmetrical chest rise. Call bell within reach. 7280: Bedside and Verbal shift change report given to Jennifer Kumar RN (oncoming nurse) by Kika Torres RN (offgoing nurse). Report included the following information SBAR, MAR and Recent Results.

## 2021-05-03 NOTE — PROGRESS NOTES
Indication: Hypertension, Pre-existingw/pre-eclampsia 3rd Tri O11.3.   ____________________________________________________________________________  History: Age: 27 years. : 1 Para: 0.   Current Pregnancy: Blood group: A Rhesus D-positive. Pre- pregnancy data: Weight 180 lbs. Height 5 ft 5 ins. BMI 30.0.  ____________________________________________________________________________  Dating:  LMP: 20 EDC: 21 GA by LMP: 34w6d  Best Overall Assessment: 21 EDC: 21 Assessed GA: 34w6d  The Best Overall Assessment is based on the LMP.  ____________________________________________________________________________  Anatomy Scan:  Espinoza gestation. Fetal heart activity: present. Fetal heart rate: 151 bpm.   Fetal presentation: cephalic. Cord: 3 vessels. Placenta: anterior. Fetal Anatomy:  Visualized with normal appearance: chest, gastrointestinal tract, kidneys, bladder. Heart: The 4-chamber view was obtained but outflow tracts could not be adequately visualized. Summary of Ultrasound Findings:  Transabdominal US. U/S machine: Hootsuite E8 Expert. U/S view: limited by late gestational age.     ____________________________________________________________________________  Fetal Wellbeing Assessment:  Amniotic fluid: normal. ADOLFO: 12.6 cm. Biophysical Profile: Fetal body movements: normal (2), Fetal tone: normal (2), Fetal breathing movements: normal (2), Amniotic fluid volume: normal (2). Score 8 / 8. Summary: Reassuring fetal status. ____________________________________________________________________________  Doppler:  Fetal Doppler:  Umbilical Artery: PS 59.6 cm/s    ED 18.62 cm/s    S/D ratio 1.86     RI 0.46     PI 0.58     TAMX 27.27 cm/s     U/S Machine: Hootsuite E8 Expert.  ____________________________________________________________________________  Report Summary:  Impression: Ms. Dolores Henning is admitted for superimposed pre-eclampsia.   She reports mild HA over the weekend but otherwise feels well. There have been no severe range BPs over the weekend. I reviewed her Pre-e labs this morning and thay are normal (plt 164, hgb 11.2, crea 0.56, ast 15). Today we see a SLIUP with ADOLFO 13 cm. Her NST last night was reactive. BPP this morning is 8/8. Reassuring fetal surveillance. Recommendations: Cont weekly BPP. Delivery is recommended no later than 37w0. If there are any additional severe range blood pressures of signs/symptoms consistent with severe disease, I recommend delivery.

## 2021-05-03 NOTE — PROGRESS NOTES
Ante Partum Progress Note    Maria C Acuna  34w6d  LOS: 5      Patient states she has no new complaints  Mild HA, tylenol helps, on different parts of head. No vb/vd/lof: had evaluation for lof this weekend. Neg amnisure. No cp/sob/vision changes  Inc FM. Vitals:  Patient Vitals for the past 24 hrs:   Temp Pulse Resp BP   21 0853 98.5 °F (36.9 °C) 93 14 (!) 154/98   21 2327 -- 72 -- (!) 152/91   21 1948 99.1 °F (37.3 °C) 74 18 (!) 151/90   21 1811 -- -- -- (!) 141/93   21 1443 98.1 °F (36.7 °C) -- -- --       Temp (24hrs), Av.6 °F (37 °C), Min:98.1 °F (36.7 °C), Max:99.1 °F (37.3 °C)        Last 24hr Input/Output:  No intake or output data in the 24 hours ending 21 1050   ]    Exam:    General: Patient without distress. Abdomen: soft, non-tender  Fundus: soft, gravid, non-tender  Extremities: no redness or tenderness or cords            Labs:   Recent Results (from the past 24 hour(s))   CBC WITH AUTOMATED DIFF    Collection Time: 21  3:15 AM   Result Value Ref Range    WBC 13.4 (H) 3.6 - 11.0 K/uL    RBC 3.79 (L) 3.80 - 5.20 M/uL    HGB 11.2 (L) 11.5 - 16.0 g/dL    HCT 33.5 (L) 35.0 - 47.0 %    MCV 88.4 80.0 - 99.0 FL    MCH 29.6 26.0 - 34.0 PG    MCHC 33.4 30.0 - 36.5 g/dL    RDW 13.6 11.5 - 14.5 %    PLATELET 845 930 - 808 K/uL    MPV 12.9 8.9 - 12.9 FL    NRBC 0.0 0  WBC    ABSOLUTE NRBC 0.00 0.00 - 0.01 K/uL    NEUTROPHILS 73 32 - 75 %    LYMPHOCYTES 20 12 - 49 %    MONOCYTES 4 (L) 5 - 13 %    EOSINOPHILS 2 0 - 7 %    BASOPHILS 0 0 - 1 %    MYELOCYTES 1 (H) 0 %    IMMATURE GRANULOCYTES 0 %    ABS. NEUTROPHILS 9.8 (H) 1.8 - 8.0 K/UL    ABS. LYMPHOCYTES 2.7 0.8 - 3.5 K/UL    ABS. MONOCYTES 0.5 0.0 - 1.0 K/UL    ABS. EOSINOPHILS 0.3 0.0 - 0.4 K/UL    ABS. BASOPHILS 0.0 0.0 - 0.1 K/UL    ABS. IMM.  GRANS. 0.0 K/UL    DF MANUAL      RBC COMMENTS NORMOCYTIC, NORMOCHROMIC     METABOLIC PANEL, COMPREHENSIVE    Collection Time: 21  3:15 AM   Result Value Ref Range    Sodium 137 136 - 145 mmol/L    Potassium 4.4 3.5 - 5.1 mmol/L    Chloride 108 97 - 108 mmol/L    CO2 25 21 - 32 mmol/L    Anion gap 4 (L) 5 - 15 mmol/L    Glucose 87 65 - 100 mg/dL    BUN 9 6 - 20 MG/DL    Creatinine 0.56 0.55 - 1.02 MG/DL    BUN/Creatinine ratio 16 12 - 20      GFR est AA >60 >60 ml/min/1.73m2    GFR est non-AA >60 >60 ml/min/1.73m2    Calcium 8.3 (L) 8.5 - 10.1 MG/DL    Bilirubin, total 0.3 0.2 - 1.0 MG/DL    ALT (SGPT) 15 12 - 78 U/L    AST (SGOT) 15 15 - 37 U/L    Alk. phosphatase 144 (H) 45 - 117 U/L    Protein, total 5.4 (L) 6.4 - 8.2 g/dL    Albumin 2.4 (L) 3.5 - 5.0 g/dL    Globulin 3.0 2.0 - 4.0 g/dL    A-G Ratio 0.8 (L) 1.1 - 2.2       Prenatal Labs:  Lab Results   Component Value Date/Time    Rubella, External immune 2020    HBsAg, External neg 2020    HIV, External neg 2020    Gonorrhea, External neg 2020    Chlamydia, External neg 2020         Assessment:   27 y.o.  34w6d   SIPIH  Mild anemia  Marginal PCI      Plan:  Continue hospitalization with hospitalized bedrest  Weekly BPP  Monitor for worsening s/sx SIPIH, recurrent severe BP      I spent 15 minutes of face to face time counseling and discussing plan of care, doing an exam, reviewing labs with the patient. More than 50 % of her visit was spent performing counseling. Yaritza Taylor MD    NST Inpatient Procedure Note    Maria Isabel Frazier presents for fetal non-stress test.    Indication is [facility].  5/3/2021 ~1050am    She is 34w6d. She has been monitored for more than 30 minutes. The FHR was reactive. NST Interpretation:   FHR baseline 150 bpm,   Variability:  moderate  Accelerations:  present  Decelerations Absent. Uterine contractions:  absent    Assessment  NST is reactive. NST is reassuring. Patient does need admission/observation for further monitoring. Joleencooper ePrrin was informed of the NST results and her questions were answered.     Plan:    [] Continue admission on Labor and Delivery    [] Continue observation on Labor and Delivery   [] Keep routine OB follow up upon discharge   [] Reviewed fetal movement kick counts, notify MD if decreased   [] Continue continuous fetal monitoring.    []

## 2021-05-04 PROCEDURE — 74011250637 HC RX REV CODE- 250/637: Performed by: OBSTETRICS & GYNECOLOGY

## 2021-05-04 PROCEDURE — 65270000029 HC RM PRIVATE

## 2021-05-04 RX ORDER — BUTALBITAL, ACETAMINOPHEN AND CAFFEINE 50; 325; 40 MG/1; MG/1; MG/1
1 TABLET ORAL
Status: DISCONTINUED | OUTPATIENT
Start: 2021-05-04 | End: 2021-05-15 | Stop reason: SDUPTHER

## 2021-05-04 RX ADMIN — LABETALOL HYDROCHLORIDE 200 MG: 200 TABLET, FILM COATED ORAL at 08:31

## 2021-05-04 RX ADMIN — Medication 81 MG: at 08:31

## 2021-05-04 RX ADMIN — LABETALOL HYDROCHLORIDE 200 MG: 200 TABLET, FILM COATED ORAL at 17:01

## 2021-05-04 RX ADMIN — BUPROPION HYDROCHLORIDE 150 MG: 150 TABLET, EXTENDED RELEASE ORAL at 06:33

## 2021-05-04 RX ADMIN — ACETAMINOPHEN 650 MG: 325 TABLET ORAL at 15:10

## 2021-05-04 RX ADMIN — Medication 1 TABLET: at 08:31

## 2021-05-04 RX ADMIN — ACETAMINOPHEN 650 MG: 325 TABLET ORAL at 22:35

## 2021-05-04 NOTE — PROGRESS NOTES
1857- Verbal shift change report given to TAMMY Bah RN (oncoming nurse) by IRVIN Mckinnon RN (offgoing nurse). Report included the following information SBAR, MAR and Recent Results. 1930- Pt calls out requesting to go in a wheelchair walk being pushed by her     Lino Lyman gives okay for pt to go off of unit in wheelchair    1950- This RN at bedside for assessment    2000- Pt is wheeled off of unit by  at this time  2200- This RN asks BERNICE Carbone RN to put pt on monitor for NST when available    2312- EFM discontinued after NST completed and is reactive. Signed off by Willis Brown RN    0700-Verbal shift change report given to GINA Sanchez RN (oncoming nurse) by TAMMY Bah RN (offgoing nurse). Report included the following information SBAR, Intake/Output and Recent Results.

## 2021-05-04 NOTE — PROGRESS NOTES
0646  Bedside and Verbal shift change report given to IRVIN Salgado RN (oncoming nurse) by Ghassan Velarde rn (offgoing nurse). Report included the following information SBAR, Kardex and MAR.   0848  Complete assessment done. Pt denies any needs at the present time  0930  Pt consumed 100% of her bkft. EFM applied. FHR found below umbilicus. 1029  EFM removed FM strip reactive  1115  Purposeful rounding, denies any needs  1200  Pt sitting up in the bed watching [employer]  1:51 PM  Pt watching TV at the present time, denies any needs  1500  Reassessment done. Denies any needs  1504  Medicated with tyleonol 650mg po for c/o HA  1530  Pt up to the shower. Complete linen change given egg crate applied to her mattress. Pt states tylenol helped a little  1615  Pt resting comfortable in bed   5:51 PM  Pts  at the bedside.   Denies any needs

## 2021-05-05 PROCEDURE — 65270000029 HC RM PRIVATE

## 2021-05-05 PROCEDURE — 99231 SBSQ HOSP IP/OBS SF/LOW 25: CPT | Performed by: OBSTETRICS & GYNECOLOGY

## 2021-05-05 PROCEDURE — 59025 FETAL NON-STRESS TEST: CPT | Performed by: OBSTETRICS & GYNECOLOGY

## 2021-05-05 PROCEDURE — 74011250637 HC RX REV CODE- 250/637: Performed by: OBSTETRICS & GYNECOLOGY

## 2021-05-05 RX ADMIN — Medication 81 MG: at 09:03

## 2021-05-05 RX ADMIN — Medication 1 TABLET: at 09:03

## 2021-05-05 RX ADMIN — LABETALOL HYDROCHLORIDE 200 MG: 200 TABLET, FILM COATED ORAL at 09:04

## 2021-05-05 RX ADMIN — ACETAMINOPHEN 650 MG: 325 TABLET ORAL at 10:59

## 2021-05-05 RX ADMIN — CALCIUM CARBONATE (ANTACID) CHEW TAB 500 MG 400 MG: 500 CHEW TAB at 20:50

## 2021-05-05 RX ADMIN — BUPROPION HYDROCHLORIDE 150 MG: 150 TABLET, EXTENDED RELEASE ORAL at 07:48

## 2021-05-05 RX ADMIN — LABETALOL HYDROCHLORIDE 200 MG: 200 TABLET, FILM COATED ORAL at 17:30

## 2021-05-05 NOTE — PROGRESS NOTES
1900: Bedside and Verbal shift change report given to BERNICE Gaytan RN (oncoming nurse) by GINA Sanchez RN (offgoing nurse). Report included the following information SBAR, Kardex, Intake/Output, MAR, Recent Results and Med Rec Status. 2040: RN rounding on pt. No needs expressed. 2358: RN rounding on pt. Pt sleeping at this time. 3936: Bedside and Verbal shift change report given to KYRA Sharma RN (oncoming nurse) by Luis A Gaytan RN (offgoing nurse). Report included the following information SBAR, Kardex, Intake/Output, MAR, Recent Results and Med Rec Status.

## 2021-05-05 NOTE — PROGRESS NOTES
Ante Partum Progress Note    Sofia Shin  35w0d  LOS: 6    Late entry from rounding ~8am    Patient states she has a mild HA  Good fm, fewer UC. No ruq pain, cp/sob/vision changes    Vitals:  Patient Vitals for the past 24 hrs:   Temp Pulse Resp BP SpO2   21 1703 -- 88 -- 134/83 --   21 1500 -- -- -- (!) 150/95 --   21 1201 -- 73 -- (!) 147/93 91 %   21 0848 97.7 °F (36.5 °C) 67 16 (!) 156/94 --   21 0626 -- 60 -- 132/72 --   21 0239 97.9 °F (36.6 °C) 73 14 131/66 100 %   21 2256 -- 61 -- (!) 156/99 --       Temp (24hrs), Av.8 °F (36.6 °C), Min:97.7 °F (36.5 °C), Max:97.9 °F (36.6 °C)        Last 24hr Input/Output:  No intake or output data in the 24 hours ending 21 2220   ]    Exam:    General: Patient without distress. Abdomen: soft, non-tender  Fundus: soft, gravid, non-tender  Extremities: no redness or tenderness or cords           Chest  · Respiratory Effort: breathing normal        Auscultation: normal breath sounds, clear bilaterally    Cardiovascular  · Heart:  · Auscultation: regular rate and rhythm without murmur, normal S1, S2        Labs:   No results found for this or any previous visit (from the past 24 hour(s)). Assessment:   27 y.o.  35w0d   [facility]    Plan:  Continue hospitalization with hospitalized bedrest    On this date, 2021,  I have spent 15 minutes reviewing previous notes, test results and face to face with the patient discussing the diagnosis and importance of compliance with the treatment plan as well as documenting on the day of the visit. Lucio Day MD    NST Inpatient Procedure Note    Sofia Shin presents for fetal non-stress test.    Indication is [facility]. She is 35w0d. She has been monitored for more than 30 minutes. The FHR was reactive. NST Interpretation:   FHR baseline 140 bpm,   Variability:  moderate  Accelerations:  present  Decelerations Absent.   Uterine contractions:  irritability Assessment  NST is reactive. NST is reassuring. Patient does need admission/observation for further monitoring. Jose Dietz was informed of the NST results and her questions were answered. Plan:    [x] Continue admission on Labor and Delivery    [] Continue observation on Labor and Delivery   [] Keep routine OB follow up upon discharge   [] Reviewed fetal movement kick counts, notify MD if decreased   [] Continue continuous fetal monitoring.    []

## 2021-05-05 NOTE — PROGRESS NOTES
5/5/2021  2:09 PM    MOB is 35w1d, admitted for elevated blood pressure .   Plan of care: IOL 37 wks if remains stable and does not have recurrent severe BP. CM following for needs.     Leonardo Talley

## 2021-05-05 NOTE — PROGRESS NOTES
Ante Partum Progress Note    Chris Rodgers  35w1d  LOS: 7      Patient states she does have mild headache  and does not have right upper quadrant pain  , vaginal bleeding  and vaginal leaking of fluid   Took some tylenol. +FM, occ UC  No vision changes. Vitals:  Patient Vitals for the past 24 hrs:   Temp Pulse Resp BP SpO2   21 1302 -- 74 -- (!) 144/80 --   21 1257 -- -- -- -- 100 %   21 0903 98.3 °F (36.8 °C) 87 14 (!) 151/92 --   21 0344 98.4 °F (36.9 °C) (!) 55 16 (!) 153/90 --   21 1956 98.3 °F (36.8 °C) 68 16 (!) 148/89 --   21 1703 -- 88 -- 134/83 --   21 1500 -- -- -- (!) 150/95 --       Temp (24hrs), Av.3 °F (36.8 °C), Min:98.3 °F (36.8 °C), Max:98.4 °F (36.9 °C)        Last 24hr Input/Output:  No intake or output data in the 24 hours ending 21 1354   ]    Exam:    General: Patient without distress. Abdomen: soft, non-tender  Fundus: soft, gravid, non-tender  Extremities: no redness or tenderness or cords               Labs:   No results found for this or any previous visit (from the past 24 hour(s)). Assessment:   27 y.o.  35w1d   SIPIH on labetalol 200mg bid    Plan:  Continue hospitalization with hospitalized bedrest  GBS swab  Repeat labs tomorrow  Teddy Jacobson MD    On this date, 2021,  I have spent 15 minutes reviewing previous notes, test results and face to face with the patient discussing the diagnosis and importance of compliance with the treatment plan as well as documenting on the day of the visit. NST Inpatient Procedure Note    Chris Rodgers presents for fetal non-stress test.    Indication is SIPIH. She is 35w1d. She has been monitored for more than 30 minutes. The FHR was reactive. NST Interpretation:   FHR baseline 130 bpm,   Variability:  moderate  Accelerations:  present  Decelerations absent. Uterine contractions:  irregular     Assessment  NST is reactive. NST is reassuring.     Patient does need admission/observation for further monitoring. Bernarda Buchanan was informed of the NST results and her questions were answered. Plan:    [x] Continue admission on Labor and Delivery    [] Continue observation on Labor and Delivery   [] Keep routine OB follow up upon discharge   [] Reviewed fetal movement kick counts, notify MD if decreased   [] Continue continuous fetal monitoring.    []

## 2021-05-05 NOTE — PROGRESS NOTES
0710: Bedside, Verbal and Written shift change report given to YOSEPH Sanchez RN (oncoming nurse) by Muriel Vanegas RN (offgoing nurse). Report included the following information SBAR, Kardex, Intake/Output, MAR, Recent Results, Med Rec Status and Alarm Parameters . 1055: Pt connected to EFM for NSt    1130: Pt provided with caffienated hot tea at MD suggestion to help with HA.     1156: Pt tilted L    1232: Pt up to bathroom. Steady gait noted and no assistance needed. 1235: Pt back in bed and turned to R side. Pt reporting mild cramping that decreases with PO hydration. 1300: Pt disconnected from EFM at this time. 1530: RN rounding on pt. Pt lying in bed talking on phone. Pt reports headache is better but still present. Pt reports consistent cramping and a scant amount of clear/yellow thick discharge. Pt reports this discharge has been present on and off throughout pregnancy. 1800: Pt in wheelchair being taken off unit by . 1850: Pt back on unit    1900: Bedside, Verbal and Written shift change report given to Flako Garcia (oncoming nurse) by Luis Alfredo Newby RN (offgoing nurse). Report included the following information SBAR, Kardex, Intake/Output, MAR, Recent Results, Med Rec Status and Alarm Parameters .

## 2021-05-06 LAB
ALBUMIN SERPL-MCNC: 2.6 G/DL (ref 3.5–5)
ALBUMIN/GLOB SERPL: 0.8 {RATIO} (ref 1.1–2.2)
ALP SERPL-CCNC: 153 U/L (ref 45–117)
ALT SERPL-CCNC: 16 U/L (ref 12–78)
ANION GAP SERPL CALC-SCNC: 8 MMOL/L (ref 5–15)
AST SERPL-CCNC: 13 U/L (ref 15–37)
BASOPHILS # BLD: 0.1 K/UL (ref 0–0.1)
BASOPHILS NFR BLD: 1 % (ref 0–1)
BILIRUB SERPL-MCNC: 0.3 MG/DL (ref 0.2–1)
BUN SERPL-MCNC: 9 MG/DL (ref 6–20)
BUN/CREAT SERPL: 16 (ref 12–20)
CALCIUM SERPL-MCNC: 8.4 MG/DL (ref 8.5–10.1)
CHLORIDE SERPL-SCNC: 107 MMOL/L (ref 97–108)
CO2 SERPL-SCNC: 23 MMOL/L (ref 21–32)
CREAT SERPL-MCNC: 0.57 MG/DL (ref 0.55–1.02)
DIFFERENTIAL METHOD BLD: ABNORMAL
EOSINOPHIL # BLD: 0.2 K/UL (ref 0–0.4)
EOSINOPHIL NFR BLD: 2 % (ref 0–7)
ERYTHROCYTE [DISTWIDTH] IN BLOOD BY AUTOMATED COUNT: 13.6 % (ref 11.5–14.5)
GLOBULIN SER CALC-MCNC: 3.2 G/DL (ref 2–4)
GLUCOSE SERPL-MCNC: 87 MG/DL (ref 65–100)
GRBS, EXTERNAL: POSITIVE
HCT VFR BLD AUTO: 34.8 % (ref 35–47)
HGB BLD-MCNC: 11.5 G/DL (ref 11.5–16)
IMM GRANULOCYTES # BLD AUTO: 0.2 K/UL (ref 0–0.04)
IMM GRANULOCYTES NFR BLD AUTO: 2 % (ref 0–0.5)
LYMPHOCYTES # BLD: 2.1 K/UL (ref 0.8–3.5)
LYMPHOCYTES NFR BLD: 19 % (ref 12–49)
MCH RBC QN AUTO: 29.5 PG (ref 26–34)
MCHC RBC AUTO-ENTMCNC: 33 G/DL (ref 30–36.5)
MCV RBC AUTO: 89.2 FL (ref 80–99)
MONOCYTES # BLD: 0.9 K/UL (ref 0–1)
MONOCYTES NFR BLD: 8 % (ref 5–13)
NEUTS SEG # BLD: 7.6 K/UL (ref 1.8–8)
NEUTS SEG NFR BLD: 69 % (ref 32–75)
NRBC # BLD: 0 K/UL (ref 0–0.01)
NRBC BLD-RTO: 0 PER 100 WBC
PLATELET # BLD AUTO: 177 K/UL (ref 150–400)
PMV BLD AUTO: 13 FL (ref 8.9–12.9)
POTASSIUM SERPL-SCNC: 4.2 MMOL/L (ref 3.5–5.1)
PROT SERPL-MCNC: 5.8 G/DL (ref 6.4–8.2)
RBC # BLD AUTO: 3.9 M/UL (ref 3.8–5.2)
SODIUM SERPL-SCNC: 138 MMOL/L (ref 136–145)
WBC # BLD AUTO: 11.1 K/UL (ref 3.6–11)

## 2021-05-06 PROCEDURE — 59025 FETAL NON-STRESS TEST: CPT

## 2021-05-06 PROCEDURE — 59025 FETAL NON-STRESS TEST: CPT | Performed by: OBSTETRICS & GYNECOLOGY

## 2021-05-06 PROCEDURE — 87186 SC STD MICRODIL/AGAR DIL: CPT

## 2021-05-06 PROCEDURE — 85025 COMPLETE CBC W/AUTO DIFF WBC: CPT

## 2021-05-06 PROCEDURE — 87081 CULTURE SCREEN ONLY: CPT

## 2021-05-06 PROCEDURE — 87147 CULTURE TYPE IMMUNOLOGIC: CPT

## 2021-05-06 PROCEDURE — 99232 SBSQ HOSP IP/OBS MODERATE 35: CPT | Performed by: OBSTETRICS & GYNECOLOGY

## 2021-05-06 PROCEDURE — 36415 COLL VENOUS BLD VENIPUNCTURE: CPT

## 2021-05-06 PROCEDURE — 65270000029 HC RM PRIVATE

## 2021-05-06 PROCEDURE — 80053 COMPREHEN METABOLIC PANEL: CPT

## 2021-05-06 PROCEDURE — 87077 CULTURE AEROBIC IDENTIFY: CPT

## 2021-05-06 PROCEDURE — 74011250637 HC RX REV CODE- 250/637: Performed by: OBSTETRICS & GYNECOLOGY

## 2021-05-06 RX ADMIN — LABETALOL HYDROCHLORIDE 200 MG: 200 TABLET, FILM COATED ORAL at 17:57

## 2021-05-06 RX ADMIN — LABETALOL HYDROCHLORIDE 200 MG: 200 TABLET, FILM COATED ORAL at 08:09

## 2021-05-06 RX ADMIN — Medication 1 TABLET: at 08:09

## 2021-05-06 RX ADMIN — Medication 81 MG: at 08:09

## 2021-05-06 RX ADMIN — BUPROPION HYDROCHLORIDE 150 MG: 150 TABLET, EXTENDED RELEASE ORAL at 08:09

## 2021-05-06 NOTE — PROGRESS NOTES
Ante Partum Progress Note    Elma Vidal  35w2d  LOS: 8      Patient states she c/o mild ha, NI with tea, does not want anything else. +FM, no vb or LOF  VD stable, mild itching, better with soap from home. Vitals:  Patient Vitals for the past 24 hrs:   Temp Pulse Resp BP SpO2   21 1149 -- 83 -- (!) 149/94 --   21 0808 -- 78 -- (!) 155/85 --   21 0709 98.4 °F (36.9 °C) 63 14 (!) 147/91 --   21 0341 98.1 °F (36.7 °C) 62 16 (!) 142/91 --   21 1938 97.9 °F (36.6 °C) 66 16 (!) 147/92 --   21 1730 98.1 °F (36.7 °C) -- -- (!) 159/104 --   21 1302 98.4 °F (36.9 °C) 74 16 (!) 144/80 --   21 1257 -- -- -- -- 100 %       Temp (24hrs), Av.2 °F (36.8 °C), Min:97.9 °F (36.6 °C), Max:98.4 °F (36.9 °C)        Last 24hr Input/Output:  No intake or output data in the 24 hours ending 21 1247   ]    Exam:    General: Patient without distress. Abdomen: soft, non-tender  Fundus: soft, gravid, non-tender  Extremities: no redness or tenderness or cords          Labs:   Recent Results (from the past 24 hour(s))   CBC WITH AUTOMATED DIFF    Collection Time: 21  3:35 AM   Result Value Ref Range    WBC 11.1 (H) 3.6 - 11.0 K/uL    RBC 3.90 3.80 - 5.20 M/uL    HGB 11.5 11.5 - 16.0 g/dL    HCT 34.8 (L) 35.0 - 47.0 %    MCV 89.2 80.0 - 99.0 FL    MCH 29.5 26.0 - 34.0 PG    MCHC 33.0 30.0 - 36.5 g/dL    RDW 13.6 11.5 - 14.5 %    PLATELET 625 203 - 589 K/uL    MPV 13.0 (H) 8.9 - 12.9 FL    NRBC 0.0 0  WBC    ABSOLUTE NRBC 0.00 0.00 - 0.01 K/uL    NEUTROPHILS 69 32 - 75 %    LYMPHOCYTES 19 12 - 49 %    MONOCYTES 8 5 - 13 %    EOSINOPHILS 2 0 - 7 %    BASOPHILS 1 0 - 1 %    IMMATURE GRANULOCYTES 2 (H) 0.0 - 0.5 %    ABS. NEUTROPHILS 7.6 1.8 - 8.0 K/UL    ABS. LYMPHOCYTES 2.1 0.8 - 3.5 K/UL    ABS. MONOCYTES 0.9 0.0 - 1.0 K/UL    ABS. EOSINOPHILS 0.2 0.0 - 0.4 K/UL    ABS. BASOPHILS 0.1 0.0 - 0.1 K/UL    ABS. IMM.  GRANS. 0.2 (H) 0.00 - 0.04 K/UL    DF AUTOMATED     METABOLIC PANEL, COMPREHENSIVE    Collection Time: 21  3:35 AM   Result Value Ref Range    Sodium 138 136 - 145 mmol/L    Potassium 4.2 3.5 - 5.1 mmol/L    Chloride 107 97 - 108 mmol/L    CO2 23 21 - 32 mmol/L    Anion gap 8 5 - 15 mmol/L    Glucose 87 65 - 100 mg/dL    BUN 9 6 - 20 MG/DL    Creatinine 0.57 0.55 - 1.02 MG/DL    BUN/Creatinine ratio 16 12 - 20      GFR est AA >60 >60 ml/min/1.73m2    GFR est non-AA >60 >60 ml/min/1.73m2    Calcium 8.4 (L) 8.5 - 10.1 MG/DL    Bilirubin, total 0.3 0.2 - 1.0 MG/DL    ALT (SGPT) 16 12 - 78 U/L    AST (SGOT) 13 (L) 15 - 37 U/L    Alk. phosphatase 153 (H) 45 - 117 U/L    Protein, total 5.8 (L) 6.4 - 8.2 g/dL    Albumin 2.6 (L) 3.5 - 5.0 g/dL    Globulin 3.2 2.0 - 4.0 g/dL    A-G Ratio 0.8 (L) 1.1 - 2.2           Assessment:   27 y.o.  35w2d   [facility]    Plan:  Continue hospitalization with hospitalized bedrest  Monitor for s/sx severe PIH  Labs reviewed  On this date, 2021,  I have spent 30 minutes reviewing previous notes, test results and face to face with the patient discussing the diagnosis and importance of compliance with the treatment plan as well as documenting on the day of the visit. Estee Contreras MD    NST Inpatient Procedure Note    Divya Aponte presents for fetal non-stress test.    Indication is [facility]. She is 35w2d. She has been monitored for more than 30 minutes. The FHR was reactive. NST Interpretation:   FHR baseline 140 bpm,   Variability:  moderate  Accelerations:  present  Decelerations variable x1 to 120s < 20 seconds . Uterine contractions:  irregular    Assessment  NST is reactive. NST is reassuring. Patient does need admission/observation for further monitoring. Toshia Rodriguez was informed of the NST results and her questions were answered.     Plan:    [] Continue admission on Labor and Delivery    [] Continue observation on Labor and Delivery   [] Keep routine OB follow up upon discharge   [] Reviewed fetal movement kick counts, notify MD if decreased   [] Continue continuous fetal monitoring.    []

## 2021-05-06 NOTE — PROGRESS NOTES
Spiritual Care Assessment/Progress Note  [address]      NAME: Alanis Washington      MRN: [id number]  AGE: 27 y.o.  SEX: female  [place] Affiliation: Non Temple   Language: English     5/6/2021     Total Time (in minutes): 40     Spiritual Assessment begun in [facility] 2 LABOR & DELIVERY through conversation with:         [x]Patient        [x] Family    [] Friend(s)              Spiritual beliefs: (Please include comment if needed)     [x] Identifies with a trini tradition:   Krishna Irene      [] Supported by a trini community:            [] Claims no spiritual orientation:           [] Seeking spiritual identity:                [] Adheres to an individual form of spirituality:           [] Not able to assess:                           Identified resources for coping:      [x] Prayer                               [] Music                  [] Guided Imagery     [x] Family/friends                 [] Pet visits     [] Devotional reading                         [] Unknown     [] Other:                                         Interventions offered during this visit: (See comments for more details)    Patient Interventions: Affirmation of emotions/emotional suffering, Affirmation of trini, Catharsis/review of pertinent events in supportive environment, Coping skills reviewed/reinforced, Iconic (affirming the presence of God/Higher Power), Initial/Spiritual assessment, patient floor, Life review/legacy, Prayer (assurance of), Prayer (actual)           Plan of Care:     [] Support spiritual and/or cultural needs    [] Support AMD and/or advance care planning process      [] Support grieving process   [] Coordinate Rites and/or Rituals    [] Coordination with community clergy   [] No spiritual needs identified at this time   [] Detailed Plan of Care below (See Comments)  [] Make referral to Music Therapy  [] Make referral to Pet Therapy     [] Make referral to Addiction services  [] Make referral to [facility]  [] Make referral to Spiritual Care Partner  [] No future visits requested        [x] Follow up upon further referrals     Comments: Initial spiritual assessment in [facility]. Miss Symone Dupree, shared she is doing ok. On the one hand she is very grateful that she has a child who will be born soon. She is also blessed with a loving  who come to visit everyday. On the other hand she misses being home, working and daily life. She shared some tears which I believe may be tears mixed with karely and struggles of being in the hospital.  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 They have been blessed to be together for 6 years, 5 years . They did not think they would be able to conceive and now they have a blessing coming. Provided supportive listening ear as she shared about her life, challenges and joys. Her  is Pentecostal and she is working on becoming [employer]. She requested prayer for her child to be born healthy. Provided spiritual presence and prayer. Contact Spiritual Care for any further referrals.   Visited by: Frederick Mobley., MS., [address] [facility] [address] ([id number])

## 2021-05-06 NOTE — PROGRESS NOTES
6235 Verbal and bedside SBAR received from BERNICE Malagon RN. Patient care assumed at this time. 3679 Morning assessment completed. Patient doing well. Denies any needs. Patient to notify RN when she is finished with breakfast for NST.     0809 Morning medications given by TIESHA Gale.     5501 Patient placed on EFM for morning NST.     3510 Removed from EFM. NST reactive. Dann on the monitor but denies feeling any contractions. 1048 Rounding on patient.  just left bedside for care rounds. Denies any needs. States she is feeling good and has no complaints. 1149 BP taken. Doing well. Denies any needs. 1330 Rounding on patient. On the phone and eating lunch. 1504 Afternoon assessment completed. Patient doing well. Denies any needs. 1620 GBS swab collected by Kim Burns RN.     3516 BP taken and evening BP medication given by IRVIN Voss RN.     2092 Bedside and Verbal shift change report given to DIANEN Lester (oncoming nurse) by Nasir Giron RN (offgoing nurse). Report included the following information SBAR, Kardex, Intake/Output, MAR, Accordion, Recent Results and Med Rec Status.

## 2021-05-06 NOTE — PROGRESS NOTES
Nutrition Assessment     Type and Reason for Visit: Initial, RD nutrition re-screen/LOS    Nutrition Recommendations/Plan:   1. Continue Regular diet    - provided cafe and grill options to patient to expand food choices while in the hospital on bed rest.     2. Encourage adequate hydration     3. Please document BMs on flow sheets      Nutrition Assessment:     Pt admitted on bedrest for Hypertension in pregnancy [O16.9] for several days prior to delivery. RD visited with patient, her  at bedside. Pt has been getting some outside foods, some foods from cafe and meals provided at bedside. Pt has menu at bedside to order meals. No GI concerns or complaints at this time. Pt is eating well, wt gain is appropriate for gestation. Pt does not complain of constipation. Will follow for meal acceptance & adequate PO over the next few weeks. Malnutrition Assessment:  Malnutrition Status: No malnutrition     Estimated Daily Nutrient Needs:  Energy (kcal):  2040 - 2455 kcal/d (25-30 kcal/kg)  Protein (g):  80-100g/d (1-1.2g/kg of UBW pre-pregnancy)       Fluid (ml/day):  2040mL+    Nutrition Related Findings:       Last BM on flow sheet noted 4/27   - however pt does not      Current Nutrition Therapies:  DIET REGULAR  DIET ONE TIME MESSAGE    Documented Meal intake:  No data found. - Pt & RN reports pt is eating meals well    Documentation of supplement intake:  No data found. Anthropometric Measures:  · Height:  5' 5\" (165.1 cm)  · Current Body Wt:  94.4 kg (208 lb 1.8 oz)  · BMI: 34.6    Nutrition Diagnosis:   · No nutrition diagnosis at this time         Nutrition Intervention:  Food and/or Nutrient Delivery: Continue current diet, Snacks (specify) - pt instructed to request desired snacks PRN or added to meals when ordered.   Nutrition Education and Counseling: Education completed  Coordination of Nutrition Care: Continue to monitor while inpatient    Goals:  n/a       Nutrition Monitoring and Evaluation: Behavioral-Environmental Outcomes:    Food/Nutrient Intake Outcomes: Food and nutrient intake  Physical Signs/Symptoms Outcomes: GI status, Weight    Discharge Planning:    No discharge needs at this time       Electronically signed by Kel Stanley RD, MS on 5/6/2021 at 2:03 PM  Contact via [address] [place] [address] or office [phone]

## 2021-05-07 PROCEDURE — 74011250637 HC RX REV CODE- 250/637: Performed by: OBSTETRICS & GYNECOLOGY

## 2021-05-07 PROCEDURE — 99232 SBSQ HOSP IP/OBS MODERATE 35: CPT | Performed by: OBSTETRICS & GYNECOLOGY

## 2021-05-07 PROCEDURE — 59025 FETAL NON-STRESS TEST: CPT | Performed by: OBSTETRICS & GYNECOLOGY

## 2021-05-07 PROCEDURE — 65270000029 HC RM PRIVATE

## 2021-05-07 RX ADMIN — Medication 1 TABLET: at 08:55

## 2021-05-07 RX ADMIN — LABETALOL HYDROCHLORIDE 200 MG: 200 TABLET, FILM COATED ORAL at 08:55

## 2021-05-07 RX ADMIN — CALCIUM CARBONATE (ANTACID) CHEW TAB 500 MG 400 MG: 500 CHEW TAB at 21:26

## 2021-05-07 RX ADMIN — ACETAMINOPHEN 650 MG: 325 TABLET ORAL at 13:17

## 2021-05-07 RX ADMIN — LABETALOL HYDROCHLORIDE 200 MG: 200 TABLET, FILM COATED ORAL at 17:04

## 2021-05-07 RX ADMIN — BUPROPION HYDROCHLORIDE 150 MG: 150 TABLET, EXTENDED RELEASE ORAL at 07:48

## 2021-05-07 RX ADMIN — Medication 81 MG: at 08:54

## 2021-05-07 NOTE — PROGRESS NOTES
0700 Bedside and Verbal shift change report given to IRVIN Esquivel RN by Olga Velazco. Report included the following information SBAR, MAR and Recent Results.

## 2021-05-07 NOTE — PROGRESS NOTES
8608  Bedside and Verbal shift change report given to IRVIN Chery RN (oncoming nurse) by Etelvina Morris (offgoing nurse). Report included the following information SBAR, Kardex and MAR.   9474  Complete assessment done. Pt denies any needs. States she slept well  0815  Pt consumed 100% of her bkft  0900  Dr Dave Downs at the bedside. Pt c/o increase in vaginal discharge. SVE closed and soft. EFM applied. FHR found below belly button  0928  EFM removed. FHR reactive in the 145's. Pt talking on the phone to her mother  21   Pt talking on the phone. Denies any needs  1100  Sat bedside with the pt active listening while she vented about her mentally ill mother, narcicisstic, mother n law. Pt tearful and upset. Reassurance given. 1230  Pt sitting up in bed watching [employer]  1:30 PM Medicated with tylenol 650mg po for lower hip discomfort  1400  Pt states tylenol helped with her hip discomfort  1515  Pt sitting up in bed watching TV  4:24 PM  Pts  at the bedside.   Pt denies any needs

## 2021-05-08 LAB
ALBUMIN SERPL-MCNC: 2.5 G/DL (ref 3.5–5)
ALBUMIN/GLOB SERPL: 0.8 {RATIO} (ref 1.1–2.2)
ALP SERPL-CCNC: 154 U/L (ref 45–117)
ALT SERPL-CCNC: 19 U/L (ref 12–78)
ANION GAP SERPL CALC-SCNC: 7 MMOL/L (ref 5–15)
AST SERPL-CCNC: 15 U/L (ref 15–37)
BASOPHILS # BLD: 0 K/UL (ref 0–0.1)
BASOPHILS NFR BLD: 0 % (ref 0–1)
BILIRUB SERPL-MCNC: 0.3 MG/DL (ref 0.2–1)
BUN SERPL-MCNC: 10 MG/DL (ref 6–20)
BUN/CREAT SERPL: 19 (ref 12–20)
CALCIUM SERPL-MCNC: 8.3 MG/DL (ref 8.5–10.1)
CHLORIDE SERPL-SCNC: 108 MMOL/L (ref 97–108)
CO2 SERPL-SCNC: 23 MMOL/L (ref 21–32)
CREAT SERPL-MCNC: 0.54 MG/DL (ref 0.55–1.02)
DIFFERENTIAL METHOD BLD: ABNORMAL
EOSINOPHIL # BLD: 0.2 K/UL (ref 0–0.4)
EOSINOPHIL NFR BLD: 2 % (ref 0–7)
ERYTHROCYTE [DISTWIDTH] IN BLOOD BY AUTOMATED COUNT: 13.8 % (ref 11.5–14.5)
GLOBULIN SER CALC-MCNC: 3.1 G/DL (ref 2–4)
GLUCOSE SERPL-MCNC: 83 MG/DL (ref 65–100)
HCT VFR BLD AUTO: 35.7 % (ref 35–47)
HGB BLD-MCNC: 11.9 G/DL (ref 11.5–16)
IMM GRANULOCYTES # BLD AUTO: 0.2 K/UL (ref 0–0.04)
IMM GRANULOCYTES NFR BLD AUTO: 2 % (ref 0–0.5)
LYMPHOCYTES # BLD: 1.7 K/UL (ref 0.8–3.5)
LYMPHOCYTES NFR BLD: 16 % (ref 12–49)
MCH RBC QN AUTO: 29.2 PG (ref 26–34)
MCHC RBC AUTO-ENTMCNC: 33.3 G/DL (ref 30–36.5)
MCV RBC AUTO: 87.7 FL (ref 80–99)
MONOCYTES # BLD: 0.9 K/UL (ref 0–1)
MONOCYTES NFR BLD: 8 % (ref 5–13)
NEUTS SEG # BLD: 7.8 K/UL (ref 1.8–8)
NEUTS SEG NFR BLD: 73 % (ref 32–75)
NRBC # BLD: 0 K/UL (ref 0–0.01)
NRBC BLD-RTO: 0 PER 100 WBC
PLATELET # BLD AUTO: 165 K/UL (ref 150–400)
POTASSIUM SERPL-SCNC: 4.1 MMOL/L (ref 3.5–5.1)
PROT SERPL-MCNC: 5.6 G/DL (ref 6.4–8.2)
RBC # BLD AUTO: 4.07 M/UL (ref 3.8–5.2)
SODIUM SERPL-SCNC: 138 MMOL/L (ref 136–145)
WBC # BLD AUTO: 10.8 K/UL (ref 3.6–11)

## 2021-05-08 PROCEDURE — 36415 COLL VENOUS BLD VENIPUNCTURE: CPT

## 2021-05-08 PROCEDURE — 74011250636 HC RX REV CODE- 250/636: Performed by: FAMILY MEDICINE

## 2021-05-08 PROCEDURE — 80053 COMPREHEN METABOLIC PANEL: CPT

## 2021-05-08 PROCEDURE — 74011250637 HC RX REV CODE- 250/637: Performed by: OBSTETRICS & GYNECOLOGY

## 2021-05-08 PROCEDURE — 65270000029 HC RM PRIVATE

## 2021-05-08 PROCEDURE — 85025 COMPLETE CBC W/AUTO DIFF WBC: CPT

## 2021-05-08 RX ORDER — SODIUM CHLORIDE, SODIUM LACTATE, POTASSIUM CHLORIDE, CALCIUM CHLORIDE 600; 310; 30; 20 MG/100ML; MG/100ML; MG/100ML; MG/100ML
999 INJECTION, SOLUTION INTRAVENOUS
Status: COMPLETED | OUTPATIENT
Start: 2021-05-08 | End: 2021-05-09

## 2021-05-08 RX ORDER — FOLIC ACID/MULTIVIT,IRON,MINER 0.4MG-18MG
1 TABLET ORAL DAILY
Status: DISCONTINUED | OUTPATIENT
Start: 2021-05-08 | End: 2021-05-08 | Stop reason: CLARIF

## 2021-05-08 RX ORDER — SWAB
1 SWAB, NON-MEDICATED MISCELLANEOUS DAILY
Status: DISCONTINUED | OUTPATIENT
Start: 2021-05-09 | End: 2021-05-21 | Stop reason: HOSPADM

## 2021-05-08 RX ORDER — SWAB
1 SWAB, NON-MEDICATED MISCELLANEOUS DAILY
Status: DISCONTINUED | OUTPATIENT
Start: 2021-05-08 | End: 2021-05-08 | Stop reason: CLARIF

## 2021-05-08 RX ADMIN — LABETALOL HYDROCHLORIDE 200 MG: 200 TABLET, FILM COATED ORAL at 17:40

## 2021-05-08 RX ADMIN — SODIUM CHLORIDE, POTASSIUM CHLORIDE, SODIUM LACTATE AND CALCIUM CHLORIDE 999 ML/HR: 600; 310; 30; 20 INJECTION, SOLUTION INTRAVENOUS at 23:37

## 2021-05-08 RX ADMIN — LABETALOL HYDROCHLORIDE 200 MG: 200 TABLET, FILM COATED ORAL at 08:30

## 2021-05-08 RX ADMIN — CALCIUM CARBONATE (ANTACID) CHEW TAB 500 MG 400 MG: 500 CHEW TAB at 22:55

## 2021-05-08 RX ADMIN — BUPROPION HYDROCHLORIDE 150 MG: 150 TABLET, EXTENDED RELEASE ORAL at 07:48

## 2021-05-08 RX ADMIN — Medication 81 MG: at 08:30

## 2021-05-08 NOTE — PROGRESS NOTES
Late entry from rounding 845 am.    Ante Partum Progress Note    Anne Griffin  35w3d  LOS: 9      Patient states she denies CP/SOB/vision changes/ RUQ pain. Occ mild HA. Good Fm but more VD, no gush or vaginal bleeding. More cramping. Vitals:  Patient Vitals for the past 24 hrs:   Temp Pulse Resp BP   21 1923 97.7 °F (36.5 °C) 76 16 (!) 140/84   21 1706 -- 81 -- (!) 151/93   21 1415 -- 81 -- (!) 148/83   21 1100 -- -- -- (!) 158/64   21 0747 97.7 °F (36.5 °C) 75 16 (!) 142/86   21 0405 98.4 °F (36.9 °C) 75 16 (!) 151/79   21 2317 98.6 °F (37 °C) 88 16 131/84       Temp (24hrs), Av.1 °F (36.7 °C), Min:97.7 °F (36.5 °C), Max:98.6 °F (37 °C)        Last 24hr Input/Output:  No intake or output data in the 24 hours ending 218   ]    Exam:    General: Patient without distress. Chest  · Respiratory Effort: breathing normal        Auscultation: normal breath sounds, clear bilaterally    Cardiovascular  · Heart:  · Auscultation: regular rate and rhythm without murmur, normal S1, S2  Abdomen: soft, non-tender  Fundus: soft, gravid, non-tender  Extremities: no redness or tenderness or cords        Cervix: ex os ftp/30/mid/med/ vtx    Labs:   No results found for this or any previous visit (from the past 24 hour(s)). Assessment:   27 y.o.  35w3d   SIPIH on labetalol 200mg BID, stable with occ but not persistent severe range BP  Sp bmz x2  GBS obtained      Plan:  Continue hospitalization with hospitalized bedrest  IOL 37 wks if stable  Repeat [address] labs in am and Monday  MFM fu Monday      Rosaura Cornejo MD    NST Inpatient Procedure Note    Anne Griffin presents for fetal non-stress test.    Indication is [facility]. She is 35w3d. She has been monitored for more than 30 minutes. The FHR was reactive. NST Interpretation:   FHR baseline 130 bpm,   Variability:  moderate  Accelerations:  present  Decelerations Absent.   Uterine contractions: irregular    Assessment  NST is reactive. NST is reassuring. Patient does need admission/observation for further monitoring. Juan Schroeder was informed of the NST results and her questions were answered. Plan:    [x] Continue admission on Labor and Delivery    [] Continue observation on Labor and Delivery   [] Keep routine OB follow up upon discharge   [] Reviewed fetal movement kick counts, notify MD if decreased   [] Continue continuous fetal monitoring. []      On this date, 5/7/2021,  I have spent 25 minutes reviewing previous notes, examining the patient, reviewing test results and face to face time with the patient discussing the diagnosis, plan of care and importance of compliance with the treatment plan and perfroming an exam.  We reviewed the planned hospital course as well as documented on the day of the hospitalization.

## 2021-05-08 NOTE — PROGRESS NOTES
0710 Received report from Daniella Cohn RN. Will continue to monitor. 1848 Bedside and Verbal shift change report given to Daniella Cohn RN (oncoming nurse) by Katerine Cantor RN (offgoing nurse). Report included the following information SBAR, Kardex and MAR.

## 2021-05-08 NOTE — PROGRESS NOTES
[address] [facility]  Medicine Attending:    Hx: Pt lying comfortably in bed without complaints. Denies vision changes, RUQ pain, sob, cp. She has mild HAs that are pressure like that are at her baseline (she reports HAs prior to pregnancy - both migraines and HAs due to allergies), these are no worse than her baseline. Fioricet helps but she says she doesn't like taking it. Good FM, no ctx. Exam:   VS:   Patient Vitals for the past 4 hrs:   Temp Pulse Resp BP SpO2   05/08/21 0734 98.1 °F (36.7 °C) 74 16 (!) 133/92 99 %       GEN: NAD, lying in bed comfortably  ABD: soft, gravid, ND, no RUQ ttp  EXT: no calf tenderness   PSYCH: normal mood and affect   NST pending     Assessment/Plan:  32yo G1 @ 35w4d   1. Superimposed non-severe preE: plan is for hospitalization until IOL at 37wk, currently no si/sx worsening disease, cont Labetalol 200 bid, repeat PIH labs pending this morning, weekly testing with MFM, twice daily NST  2. IUP: GBS pos, called and added sensitivities for PCN allergy and lab says should be resulted 5/9 or 5/10 (of note, on review of chart pt has taken cephalosporin in the past), EFW 41st% on 4/29, s/p tdap, glucola wnl, NIPT and MSAFP wnl  3.   Hx depression: Wellbutrin     Jacob Alvarado, DO 5/8/2021

## 2021-05-09 PROCEDURE — 65270000029 HC RM PRIVATE

## 2021-05-09 PROCEDURE — 74011250637 HC RX REV CODE- 250/637: Performed by: FAMILY MEDICINE

## 2021-05-09 PROCEDURE — 74011250637 HC RX REV CODE- 250/637: Performed by: OBSTETRICS & GYNECOLOGY

## 2021-05-09 RX ORDER — SIMETHICONE 80 MG
80 TABLET,CHEWABLE ORAL
Status: DISCONTINUED | OUTPATIENT
Start: 2021-05-09 | End: 2021-05-21 | Stop reason: HOSPADM

## 2021-05-09 RX ADMIN — SIMETHICONE 80 MG: 80 TABLET, CHEWABLE ORAL at 00:32

## 2021-05-09 RX ADMIN — Medication 81 MG: at 08:10

## 2021-05-09 RX ADMIN — SIMETHICONE 80 MG: 80 TABLET, CHEWABLE ORAL at 19:07

## 2021-05-09 RX ADMIN — BUPROPION HYDROCHLORIDE 150 MG: 150 TABLET, EXTENDED RELEASE ORAL at 08:10

## 2021-05-09 RX ADMIN — LABETALOL HYDROCHLORIDE 200 MG: 200 TABLET, FILM COATED ORAL at 08:10

## 2021-05-09 RX ADMIN — Medication 1 TABLET: at 08:10

## 2021-05-09 RX ADMIN — LABETALOL HYDROCHLORIDE 200 MG: 200 TABLET, FILM COATED ORAL at 17:57

## 2021-05-09 RX ADMIN — ACETAMINOPHEN 650 MG: 325 TABLET ORAL at 16:30

## 2021-05-09 NOTE — PROGRESS NOTES
1900: Bedside and Verbal shift change report given to KATE Chu RN (oncoming nurse) by MÓNICA Carbone (offgoing nurse). Report included the following information SBAR, Kardex, Procedure Summary, Intake/Output, MAR and Recent Results. 1937: Pt placed on EFM at this time    2023: Pt taken off EFM at this time, reactive strip reviewed by Gregoria Anthony RN    8451: Bedside and Verbal shift change report given to Macey Milan RN (oncoming nurse) by KATE Chu RN (offgoing nurse). Report included the following information SBAR, Kardex, Procedure Summary, Intake/Output, MAR and Recent Results.

## 2021-05-09 NOTE — PROGRESS NOTES
1907 Bedside and Verbal shift change report given to Emily Polo RN (oncoming nurse) by Darron Hirsch (offgoing nurse). Report included the following information SBAR, Kardex and MAR.

## 2021-05-09 NOTE — PROGRESS NOTES
2310 Call to Dr Wolfe Dies to update that pt reports feeling some of her contractions right now. MD orders to bolus a liter iv LR now.    2323 #20g iv started in R hand x1 attempt by this RN. Pt tolerated well.

## 2021-05-09 NOTE — PROGRESS NOTES
[address] [facility]  Medicine Attending:     Hx: Pt reported to RN feeling contractions during her NST, IV started and fluid bolus given which is finishing now. She reports her contractions are less frequent and less intense, more of a pressure feeling. Denies vision changes, RUQ pain, cp, sob. HAs are at baseline. Reports lot of gas making her feel uncomfortable. TUMS has helped some.       Exam:   VS:   Patient Vitals for the past 4 hrs:   Temp Pulse Resp BP   05/08/21 2302 98.4 °F (36.9 °C) 74 16 (!) 140/84         GEN: NAD, lying in bed comfortably  ABD: soft, gravid, ND, no RUQ ttp  EXT: no calf tenderness   PSYCH: normal mood and affect   NST 140s, mod kalee, +accels, no decels  TOCO: contractions Q1 min that look more like irritability, pt reporting feeling them every 7 min     Assessment/Plan:  30yo G1 @ 35w4d   1. Superimposed non-severe preE: plan is for hospitalization until IOL at 37wk, currently no si/sx worsening disease, cont Labetalol 200 bid, repeat PIH labs 5/8 normal, next on 5/10, weekly testing with MFM, twice daily NST  2. IUP: GBS pos, called and added sensitivities for PCN allergy and lab says should be resulted 5/9 or 5/10 (of note, on review of chart pt has taken cephalosporin in the past), EFW 41st% on 4/29, s/p tdap, glucola wnl, NIPT and MSAFP wnl  3. Hx depression: Wellbutrin   4.   Gas: Simethicone + TUMS ordered      Jacob Alvarado DO 5/9/2021

## 2021-05-10 LAB
ALBUMIN SERPL-MCNC: 2.5 G/DL (ref 3.5–5)
ALBUMIN/GLOB SERPL: 0.9 {RATIO} (ref 1.1–2.2)
ALP SERPL-CCNC: 146 U/L (ref 45–117)
ALT SERPL-CCNC: 17 U/L (ref 12–78)
ANION GAP SERPL CALC-SCNC: 8 MMOL/L (ref 5–15)
AST SERPL-CCNC: 14 U/L (ref 15–37)
BACTERIA SPEC CULT: ABNORMAL
BASOPHILS # BLD: 0 K/UL (ref 0–0.1)
BASOPHILS NFR BLD: 0 % (ref 0–1)
BILIRUB SERPL-MCNC: 0.2 MG/DL (ref 0.2–1)
BUN SERPL-MCNC: 11 MG/DL (ref 6–20)
BUN/CREAT SERPL: 20 (ref 12–20)
CALCIUM SERPL-MCNC: 7.9 MG/DL (ref 8.5–10.1)
CHLORIDE SERPL-SCNC: 109 MMOL/L (ref 97–108)
CO2 SERPL-SCNC: 23 MMOL/L (ref 21–32)
CREAT SERPL-MCNC: 0.54 MG/DL (ref 0.55–1.02)
DIFFERENTIAL METHOD BLD: ABNORMAL
EOSINOPHIL # BLD: 0.2 K/UL (ref 0–0.4)
EOSINOPHIL NFR BLD: 2 % (ref 0–7)
ERYTHROCYTE [DISTWIDTH] IN BLOOD BY AUTOMATED COUNT: 13.7 % (ref 11.5–14.5)
GLOBULIN SER CALC-MCNC: 2.9 G/DL (ref 2–4)
GLUCOSE SERPL-MCNC: 89 MG/DL (ref 65–100)
HCT VFR BLD AUTO: 33.4 % (ref 35–47)
HGB BLD-MCNC: 10.9 G/DL (ref 11.5–16)
IMM GRANULOCYTES # BLD AUTO: 0.2 K/UL (ref 0–0.04)
IMM GRANULOCYTES NFR BLD AUTO: 2 % (ref 0–0.5)
LYMPHOCYTES # BLD: 2 K/UL (ref 0.8–3.5)
LYMPHOCYTES NFR BLD: 20 % (ref 12–49)
MCH RBC QN AUTO: 28.8 PG (ref 26–34)
MCHC RBC AUTO-ENTMCNC: 32.6 G/DL (ref 30–36.5)
MCV RBC AUTO: 88.4 FL (ref 80–99)
MONOCYTES # BLD: 0.8 K/UL (ref 0–1)
MONOCYTES NFR BLD: 7 % (ref 5–13)
NEUTS SEG # BLD: 7.1 K/UL (ref 1.8–8)
NEUTS SEG NFR BLD: 69 % (ref 32–75)
NRBC # BLD: 0 K/UL (ref 0–0.01)
NRBC BLD-RTO: 0 PER 100 WBC
PLATELET # BLD AUTO: 143 K/UL (ref 150–400)
PMV BLD AUTO: 13 FL (ref 8.9–12.9)
POTASSIUM SERPL-SCNC: 3.9 MMOL/L (ref 3.5–5.1)
PROT SERPL-MCNC: 5.4 G/DL (ref 6.4–8.2)
RBC # BLD AUTO: 3.78 M/UL (ref 3.8–5.2)
SERVICE CMNT-IMP: ABNORMAL
SODIUM SERPL-SCNC: 140 MMOL/L (ref 136–145)
WBC # BLD AUTO: 10.3 K/UL (ref 3.6–11)

## 2021-05-10 PROCEDURE — 80053 COMPREHEN METABOLIC PANEL: CPT

## 2021-05-10 PROCEDURE — 59025 FETAL NON-STRESS TEST: CPT

## 2021-05-10 PROCEDURE — 74011250637 HC RX REV CODE- 250/637: Performed by: OBSTETRICS & GYNECOLOGY

## 2021-05-10 PROCEDURE — 99231 SBSQ HOSP IP/OBS SF/LOW 25: CPT | Performed by: OBSTETRICS & GYNECOLOGY

## 2021-05-10 PROCEDURE — 85025 COMPLETE CBC W/AUTO DIFF WBC: CPT

## 2021-05-10 PROCEDURE — 65270000029 HC RM PRIVATE

## 2021-05-10 PROCEDURE — 36415 COLL VENOUS BLD VENIPUNCTURE: CPT

## 2021-05-10 PROCEDURE — 76819 FETAL BIOPHYS PROFIL W/O NST: CPT | Performed by: OBSTETRICS & GYNECOLOGY

## 2021-05-10 RX ORDER — DOCUSATE SODIUM 100 MG/1
100 CAPSULE, LIQUID FILLED ORAL DAILY
Status: DISCONTINUED | OUTPATIENT
Start: 2021-05-11 | End: 2021-05-18 | Stop reason: SDUPTHER

## 2021-05-10 RX ADMIN — Medication 81 MG: at 09:58

## 2021-05-10 RX ADMIN — LABETALOL HYDROCHLORIDE 200 MG: 200 TABLET, FILM COATED ORAL at 18:15

## 2021-05-10 RX ADMIN — Medication 1 TABLET: at 09:58

## 2021-05-10 RX ADMIN — ACETAMINOPHEN 650 MG: 325 TABLET ORAL at 18:17

## 2021-05-10 RX ADMIN — LABETALOL HYDROCHLORIDE 200 MG: 200 TABLET, FILM COATED ORAL at 09:58

## 2021-05-10 RX ADMIN — ACETAMINOPHEN 650 MG: 325 TABLET ORAL at 12:56

## 2021-05-10 RX ADMIN — BUPROPION HYDROCHLORIDE 150 MG: 150 TABLET, EXTENDED RELEASE ORAL at 09:58

## 2021-05-10 NOTE — PROGRESS NOTES
Ante Partum Progress Note    Martin Mclean  35w6d  LOS: 12      Patient states she has no new complaints. Mild HA. No CP/SOB/vision changes. +FM, same  occ uc, improved. Vitals:  Patient Vitals for the past 24 hrs:   Temp Pulse Resp BP   05/10/21 1542 98.2 °F (36.8 °C) -- 16 (!) 157/96   05/10/21 0715 98.4 °F (36.9 °C) 66 16 134/69   05/10/21 0310 98.5 °F (36.9 °C) 74 16 133/74   21 2304 -- 72 -- (!) 128/56       Temp (24hrs), Av.4 °F (36.9 °C), Min:98.2 °F (36.8 °C), Max:98.5 °F (36.9 °C)        Last 24hr Input/Output:  No intake or output data in the 24 hours ending 05/10/21 1934   ]    Exam:    General: Patient without distress. Chest  · Respiratory Effort: breathing normal        Auscultation: normal breath sounds, clear bilaterally    Cardiovascular  · Heart:  · Auscultation: regular rate and rhythm without murmur, normal S1, S2  Abdomen: soft, non-tender  Fundus: soft, gravid, non-tender  Extremities: no redness or tenderness or cords                 Labs:   Recent Results (from the past 24 hour(s))   CBC WITH AUTOMATED DIFF    Collection Time: 05/10/21  3:15 AM   Result Value Ref Range    WBC 10.3 3.6 - 11.0 K/uL    RBC 3.78 (L) 3.80 - 5.20 M/uL    HGB 10.9 (L) 11.5 - 16.0 g/dL    HCT 33.4 (L) 35.0 - 47.0 %    MCV 88.4 80.0 - 99.0 FL    MCH 28.8 26.0 - 34.0 PG    MCHC 32.6 30.0 - 36.5 g/dL    RDW 13.7 11.5 - 14.5 %    PLATELET 687 (L) 632 - 400 K/uL    MPV 13.0 (H) 8.9 - 12.9 FL    NRBC 0.0 0  WBC    ABSOLUTE NRBC 0.00 0.00 - 0.01 K/uL    NEUTROPHILS 69 32 - 75 %    LYMPHOCYTES 20 12 - 49 %    MONOCYTES 7 5 - 13 %    EOSINOPHILS 2 0 - 7 %    BASOPHILS 0 0 - 1 %    IMMATURE GRANULOCYTES 2 (H) 0.0 - 0.5 %    ABS. NEUTROPHILS 7.1 1.8 - 8.0 K/UL    ABS. LYMPHOCYTES 2.0 0.8 - 3.5 K/UL    ABS. MONOCYTES 0.8 0.0 - 1.0 K/UL    ABS. EOSINOPHILS 0.2 0.0 - 0.4 K/UL    ABS. BASOPHILS 0.0 0.0 - 0.1 K/UL    ABS. IMM.  GRANS. 0.2 (H) 0.00 - 0.04 K/UL    DF AUTOMATED     METABOLIC PANEL, COMPREHENSIVE Collection Time: 05/10/21  3:15 AM   Result Value Ref Range    Sodium 140 136 - 145 mmol/L    Potassium 3.9 3.5 - 5.1 mmol/L    Chloride 109 (H) 97 - 108 mmol/L    CO2 23 21 - 32 mmol/L    Anion gap 8 5 - 15 mmol/L    Glucose 89 65 - 100 mg/dL    BUN 11 6 - 20 MG/DL    Creatinine 0.54 (L) 0.55 - 1.02 MG/DL    BUN/Creatinine ratio 20 12 - 20      GFR est AA >60 >60 ml/min/1.73m2    GFR est non-AA >60 >60 ml/min/1.73m2    Calcium 7.9 (L) 8.5 - 10.1 MG/DL    Bilirubin, total 0.2 0.2 - 1.0 MG/DL    ALT (SGPT) 17 12 - 78 U/L    AST (SGOT) 14 (L) 15 - 37 U/L    Alk. phosphatase 146 (H) 45 - 117 U/L    Protein, total 5.4 (L) 6.4 - 8.2 g/dL    Albumin 2.5 (L) 3.5 - 5.0 g/dL    Globulin 2.9 2.0 - 4.0 g/dL    A-G Ratio 0.9 (L) 1.1 - 2.2           Assessment:   27 y.o.  35w6d   CHTN labetalol 200mg BID  Depression stable on wellbutrin  POP anemia, acute intraoperative blood loss, stable  Thrombocytopenia, mild  SIPIH stable  gbs pos      Plan:  Continue hospitalization with hospitalized bedrest  PIH precautions  Repeat labs 2 days    Current Outpatient Medications   Medication Instructions    BABY ASPIRIN PO Oral    buPROPion XL (WELLBUTRIN XL) 150 mg, Oral, 7AM    labetaloL (NORMODYNE) 100 mg tablet TAKE 1 TABLET BY MOUTH TWICE A DAY    prenatal vit-iron fumarate-fa 27 mg iron- 0.8 mg tab tablet 1 Tab, Oral, DAILY       Mitzy Macias MD    NST Inpatient Procedure Note    Rosemary Davis presents for fetal non-stress test.    Indication is SIPIH. She is 35w6d. She has been monitored for more than 30 minutes. The FHR was reactive. NST Interpretation:   FHR baseline 140 bpm,   Variability:  moderate  Accelerations:  present  Decelerations Absent. Uterine contractions:  irregular    Assessment  NST is reactive. NST is reassuring. Patient does need admission/observation for further monitoring. Thurmon Angelucci was informed of the NST results and her questions were answered.     Plan:    [x] Continue admission on Labor and Delivery    [] Continue observation on Labor and Delivery   [] Keep routine OB follow up upon discharge   [] Reviewed fetal movement kick counts, notify MD if decreased   [] Continue continuous fetal monitoring. []        On this date, 5/10/2021,  I have spent 20 minutes reviewing previous notes, examining the patient, reviewing test results and face to face time with the patient discussing the diagnosis, plan of care and importance of compliance with the treatment plan and perfroming an exam.  We reviewed the planned hospital course as well as documented on the day of the hospitalization.

## 2021-05-10 NOTE — PROGRESS NOTES
0710 Bedside and Verbal shift change report given to Graeme Jett RN (oncoming nurse) by KATE Randall RN (offgoing nurse). Report included the following information SBAR, Kardex, Intake/Output, MAR and Recent Results. 0800 patient off unit to see MFM by this RN.     4157 Morning medications administered by You Arevalo RN.     1256 Tylenol administered for mild HA.     1258 EFM applied by RN.     1326 Verified reactive with Mckenna Brantley RN. Patient states HA is better.  RN rounded on patient, denies any needs at this time. 1817 Tylenol administered for HA. Patient states it is mild but that she does not want to take a Fioricet. 1830 Patient off unit for 1 hour of wheelchair privileges. 1910 Verbal shift change report given to TIESHA David RN (oncoming nurse) by Graeme Jett RN (offgoing nurse). Report included the following information SBAR, Kardex, Intake/Output, MAR and Recent Results.

## 2021-05-10 NOTE — PROGRESS NOTES
7:11 PM  SBAR report received from Kumar Alexis RN. Assumed care of the patient at this time. 7:13 PM  Patient back on the unit from her wheelchair ride. 7:58 PM  Assessment with VS performed. NST started.

## 2021-05-11 PROCEDURE — 77030018831 HC SOL IRR H20 BAXT -A

## 2021-05-11 PROCEDURE — 74011250637 HC RX REV CODE- 250/637: Performed by: OBSTETRICS & GYNECOLOGY

## 2021-05-11 PROCEDURE — 59025 FETAL NON-STRESS TEST: CPT

## 2021-05-11 PROCEDURE — 2709999900 HC NON-CHARGEABLE SUPPLY

## 2021-05-11 PROCEDURE — 59025 FETAL NON-STRESS TEST: CPT | Performed by: OBSTETRICS & GYNECOLOGY

## 2021-05-11 PROCEDURE — 65270000029 HC RM PRIVATE

## 2021-05-11 RX ADMIN — FAMOTIDINE 20 MG: 20 TABLET, FILM COATED ORAL at 21:47

## 2021-05-11 RX ADMIN — DOCUSATE SODIUM 100 MG: 100 CAPSULE, LIQUID FILLED ORAL at 09:13

## 2021-05-11 RX ADMIN — Medication 81 MG: at 09:13

## 2021-05-11 RX ADMIN — Medication 1 TABLET: at 09:13

## 2021-05-11 RX ADMIN — BUTALBITAL, ACETAMINOPHEN, AND CAFFEINE 1 TABLET: 50; 325; 40 TABLET ORAL at 18:43

## 2021-05-11 RX ADMIN — BUPROPION HYDROCHLORIDE 150 MG: 150 TABLET, EXTENDED RELEASE ORAL at 07:47

## 2021-05-11 RX ADMIN — ACETAMINOPHEN 650 MG: 325 TABLET ORAL at 09:17

## 2021-05-11 RX ADMIN — LABETALOL HYDROCHLORIDE 200 MG: 200 TABLET, FILM COATED ORAL at 09:13

## 2021-05-11 RX ADMIN — BUTALBITAL, ACETAMINOPHEN, AND CAFFEINE 1 TABLET: 50; 325; 40 TABLET ORAL at 22:55

## 2021-05-11 RX ADMIN — ACETAMINOPHEN 650 MG: 325 TABLET ORAL at 14:42

## 2021-05-11 RX ADMIN — LABETALOL HYDROCHLORIDE 200 MG: 200 TABLET, FILM COATED ORAL at 18:34

## 2021-05-11 NOTE — PROGRESS NOTES
Ante Partum Progress Note    Joan Ibrahim  35w6d  LOS: 14      Patient states she has no new complaints. Mild HA. No CP/SOB/vision changes. +FM, good  No significant vd/lof  NO VB. Head and neck feels sore, hot shower helped. Vitals:  Patient Vitals for the past 24 hrs:   Temp Pulse Resp BP   21 0745 98.8 °F (37.1 °C) 62 16 (!) 142/92   21 0013 98.4 °F (36.9 °C) 83 14 137/74   05/10/21 1958 98 °F (36.7 °C) 62 18 122/81   05/10/21 1542 98.2 °F (36.8 °C) -- 16 (!) 157/96             Last 24hr Input/Output:  No intake or output data in the 24 hours ending 21 Jet.Maxcy   ]    Exam:    General: Patient without distress. Chest  · Respiratory Effort: breathing normal        Auscultation: normal breath sounds, clear bilaterally    Cardiovascular  · Heart:  · Auscultation: regular rate and rhythm without murmur, normal S1, S2  Abdomen: soft, non-tender  Fundus: soft, gravid, non-tender  Extremities: no redness or tenderness or cords                 Labs:   No results found for this or any previous visit (from the past 24 hour(s)). Assessment:   27 y.o.  35w6d   CHTN labetalol 200mg BID  Depression stable on wellbutrin  POP anemia, acute intraoperative blood loss, stable  Thrombocytopenia, mild  SIPIH stable  gbs pos      Plan:  Continue hospitalization with hospitalized bedrest  PIH precautions  Repeat PIH labs tomorrow  Weekly MFM fu  IOL 37 wks if stable for [facility]  Disc with charge nurse re option of maternity pics  Heating pad to neck, pt declines Fioricet for now.      Current Outpatient Medications   Medication Instructions    BABY ASPIRIN PO Oral    buPROPion XL (WELLBUTRIN XL) 150 mg, Oral, 7AM    labetaloL (NORMODYNE) 100 mg tablet TAKE 1 TABLET BY MOUTH TWICE A DAY    prenatal vit-iron fumarate-fa 27 mg iron- 0.8 mg tab tablet 1 Tab, Oral, DAILY       Guevara Hayden MD    NST Inpatient Procedure Note    Joan Ibrahim presents for fetal non-stress test.    Indication is [facility].   ~10 am    She is 35w6d. She has been monitored for more than 30 minutes. The FHR was reactive. NST Interpretation:   FHR baseline 130-140 bpm,   Variability:  moderate  Accelerations:  present  Decelerations Absent. Uterine contractions:  irregular    Assessment  NST is reactive. NST is reassuring. Patient does need admission/observation for further monitoring. Thurmon Angelucci was informed of the NST results and her questions were answered. Plan:    [x] Continue admission on Labor and Delivery    [] Continue observation on Labor and Delivery   [] Keep routine OB follow up upon discharge   [] Reviewed fetal movement kick counts, notify MD if decreased   [] Continue continuous fetal monitoring. []        On this date, 5/11/2021,  I have spent 20 minutes reviewing previous notes, examining the patient, reviewing test results and face to face time with the patient discussing the diagnosis, plan of care and importance of compliance with the treatment plan and perfroming an exam.  We reviewed the planned hospital course as well as documented on the day of the hospitalization.

## 2021-05-12 LAB
ALBUMIN SERPL-MCNC: 2.4 G/DL (ref 3.5–5)
ALBUMIN/GLOB SERPL: 0.8 {RATIO} (ref 1.1–2.2)
ALP SERPL-CCNC: 162 U/L (ref 45–117)
ALT SERPL-CCNC: 19 U/L (ref 12–78)
ANION GAP SERPL CALC-SCNC: 11 MMOL/L (ref 5–15)
AST SERPL-CCNC: 17 U/L (ref 15–37)
BASOPHILS # BLD: 0.1 K/UL (ref 0–0.1)
BASOPHILS NFR BLD: 1 % (ref 0–1)
BILIRUB SERPL-MCNC: 0.3 MG/DL (ref 0.2–1)
BUN SERPL-MCNC: 7 MG/DL (ref 6–20)
BUN/CREAT SERPL: 13 (ref 12–20)
CALCIUM SERPL-MCNC: 8.2 MG/DL (ref 8.5–10.1)
CHLORIDE SERPL-SCNC: 108 MMOL/L (ref 97–108)
CO2 SERPL-SCNC: 21 MMOL/L (ref 21–32)
CREAT SERPL-MCNC: 0.53 MG/DL (ref 0.55–1.02)
DIFFERENTIAL METHOD BLD: ABNORMAL
EOSINOPHIL # BLD: 0.2 K/UL (ref 0–0.4)
EOSINOPHIL NFR BLD: 2 % (ref 0–7)
ERYTHROCYTE [DISTWIDTH] IN BLOOD BY AUTOMATED COUNT: 13.8 % (ref 11.5–14.5)
GLOBULIN SER CALC-MCNC: 3.1 G/DL (ref 2–4)
GLUCOSE SERPL-MCNC: 82 MG/DL (ref 65–100)
HCT VFR BLD AUTO: 33.9 % (ref 35–47)
HGB BLD-MCNC: 11.3 G/DL (ref 11.5–16)
IMM GRANULOCYTES # BLD AUTO: 0.2 K/UL (ref 0–0.04)
IMM GRANULOCYTES NFR BLD AUTO: 2 % (ref 0–0.5)
LYMPHOCYTES # BLD: 2.3 K/UL (ref 0.8–3.5)
LYMPHOCYTES NFR BLD: 23 % (ref 12–49)
MCH RBC QN AUTO: 29.4 PG (ref 26–34)
MCHC RBC AUTO-ENTMCNC: 33.3 G/DL (ref 30–36.5)
MCV RBC AUTO: 88.3 FL (ref 80–99)
MONOCYTES # BLD: 0.9 K/UL (ref 0–1)
MONOCYTES NFR BLD: 9 % (ref 5–13)
NEUTS SEG # BLD: 6.3 K/UL (ref 1.8–8)
NEUTS SEG NFR BLD: 64 % (ref 32–75)
NRBC # BLD: 0 K/UL (ref 0–0.01)
NRBC BLD-RTO: 0 PER 100 WBC
PLATELET # BLD AUTO: 146 K/UL (ref 150–400)
POTASSIUM SERPL-SCNC: 4 MMOL/L (ref 3.5–5.1)
PROT SERPL-MCNC: 5.5 G/DL (ref 6.4–8.2)
RBC # BLD AUTO: 3.84 M/UL (ref 3.8–5.2)
SODIUM SERPL-SCNC: 140 MMOL/L (ref 136–145)
WBC # BLD AUTO: 10 K/UL (ref 3.6–11)

## 2021-05-12 PROCEDURE — 36415 COLL VENOUS BLD VENIPUNCTURE: CPT

## 2021-05-12 PROCEDURE — 74011250637 HC RX REV CODE- 250/637: Performed by: OBSTETRICS & GYNECOLOGY

## 2021-05-12 PROCEDURE — 65270000029 HC RM PRIVATE

## 2021-05-12 PROCEDURE — 59025 FETAL NON-STRESS TEST: CPT

## 2021-05-12 PROCEDURE — 85025 COMPLETE CBC W/AUTO DIFF WBC: CPT

## 2021-05-12 PROCEDURE — 80053 COMPREHEN METABOLIC PANEL: CPT

## 2021-05-12 RX ADMIN — BUTALBITAL, ACETAMINOPHEN, AND CAFFEINE 1 TABLET: 50; 325; 40 TABLET ORAL at 14:54

## 2021-05-12 RX ADMIN — ACETAMINOPHEN 650 MG: 325 TABLET ORAL at 10:25

## 2021-05-12 RX ADMIN — CALCIUM CARBONATE (ANTACID) CHEW TAB 500 MG 400 MG: 500 CHEW TAB at 21:30

## 2021-05-12 RX ADMIN — BUPROPION HYDROCHLORIDE 150 MG: 150 TABLET, EXTENDED RELEASE ORAL at 09:05

## 2021-05-12 RX ADMIN — LABETALOL HYDROCHLORIDE 200 MG: 200 TABLET, FILM COATED ORAL at 18:04

## 2021-05-12 RX ADMIN — Medication 81 MG: at 09:05

## 2021-05-12 RX ADMIN — LABETALOL HYDROCHLORIDE 200 MG: 200 TABLET, FILM COATED ORAL at 09:05

## 2021-05-12 RX ADMIN — FAMOTIDINE 20 MG: 20 TABLET, FILM COATED ORAL at 14:14

## 2021-05-12 RX ADMIN — ACETAMINOPHEN 650 MG: 325 TABLET ORAL at 22:52

## 2021-05-12 RX ADMIN — Medication 1 TABLET: at 09:05

## 2021-05-12 NOTE — PROGRESS NOTES
0815: SBAR report received from [address] [place]: Pt connected to EFM at this time. 1044: Pt disconnected from EFM. NST reactive. Pt tearful and complaining of constant HA.     1452: Pt complaining of consistent HA.     1810: Pt reports HA is better. Pt being wheeled off unit by FOB at this time. 1908: Pt back on unit     1910: Bedside, Verbal and Written shift change report given to [address] (oncoming nurse) by Leann Rodrigez RN (offgoing nurse). Report included the following information SBAR, Kardex, Intake/Output, MAR, Recent Results, Med Rec Status and Alarm Parameters .

## 2021-05-12 NOTE — PROGRESS NOTES
3298: Bedside, Verbal and Written shift change report given to TAMMY Marshall RN (oncoming nurse) by Marj Ojeda RN (offgoing nurse). Report included the following information SBAR, Kardex, Intake/Output, MAR, Accordion, Recent Results and Med Rec Status. 1923: This RN at pt bedside. Patient resting in position of comfort in locked and lowered bed. FOB at pt bedside. RN performing assessment and placing EFM/TOCO monitors at this time for ordered NST. Pt reporting positive fetal movement. Pt denies contractions, blurred vision or epigastric pain. Pt reporting mild headache that has been constant for weeks. Pt denies need for medication. Patient denies further needs at this time. Call bell within reach. 1945: This RN removing EFM/TOCO monitors at this time per reactive [address] tracing. PT provided with fresh ice water. Pt denies further needs at this time. Call light in reach. 2045: RN at pt bedside rounding on pt. Patient resting in position of comfort in locked and lowered bed. Patient denies further needs at this time. Call bell within reach. 2130: This RN at pt bedside rounding on pt and administering prescribed Tums d/t pt request and complaint of heartburn. Pt denies further needs. Call light in reach. 0730-0717: This RN at pt bedside rounding on pt and performing BP assessment. Pt denies blurred vision, SOB or epigastric pain. Pt denies further needs at this time. Call light in reach. 0005: Patient resting in position of comfort in locked and lowered bed. Patient denies further needs at this time. Call bell within reach. 0140: Patient resting in position of comfort in locked and lowered bed. Patient denies further needs at this time. Call bell within reach. 7090-1349: This RN at pt bedside rounding on pt and performing VS check. Patient resting in position of comfort in locked and lowered bed. VSS. Patient denies further needs at this time. Call bell within reach. 2837:  This RN rounding on pt. Patient resting in position of comfort in locked and lowered bed. Call bell within reach. 0715: Bedside, Verbal and Written shift change report given to Saloni Jang 66. (oncoming nurse) by TAMMY Leigh RN (offgoing nurse). Report included the following information SBAR, Kardex, Intake/Output, MAR, Accordion, Recent Results and Med Rec Status.

## 2021-05-12 NOTE — PROGRESS NOTES
1900 - Verbal shift change report given to TAMMY Lopez (oncoming nurse) by MÓNICA Mcgregor RN (offgoing nurse). Report included the following information SBAR, Kardex, Procedure Summary, Intake/Output, MAR and Recent Results. 2147 - Pt requesting medication for heartburn. RN at pt bedside administering Pepcid at this time. 2150 - RN at pt bedside performing shift assessment and connecting pt to [facility] to obtain NST.     2223 - RN at pt bedside disconnecting pt from [facility] (NST obtained: reactive). 2255 - Pt requesting medication for HA pain. RN at pt bedside administering Fioricet at this time. [id number] [place] reassessing pt's pain. Pt states her HA is \"a bit better\". 36 - RN at pt bedside performing reassessment and drawing AM labs. Pt asleep upon RN entering room; easily awakened. 0715 - Bedside and Verbal shift change report given to DIANNE Ledesma RN (oncoming nurse) by TAMMY Lopez (offgoing nurse). Report included the following information SBAR, Kardex, Procedure Summary, Intake/Output, MAR and Recent Results.

## 2021-05-13 PROCEDURE — 59025 FETAL NON-STRESS TEST: CPT

## 2021-05-13 PROCEDURE — 74011250637 HC RX REV CODE- 250/637: Performed by: OBSTETRICS & GYNECOLOGY

## 2021-05-13 PROCEDURE — 99231 SBSQ HOSP IP/OBS SF/LOW 25: CPT | Performed by: OBSTETRICS & GYNECOLOGY

## 2021-05-13 PROCEDURE — 59025 FETAL NON-STRESS TEST: CPT | Performed by: OBSTETRICS & GYNECOLOGY

## 2021-05-13 PROCEDURE — 65270000029 HC RM PRIVATE

## 2021-05-13 RX ADMIN — ACETAMINOPHEN 650 MG: 325 TABLET ORAL at 17:58

## 2021-05-13 RX ADMIN — Medication 81 MG: at 08:47

## 2021-05-13 RX ADMIN — LABETALOL HYDROCHLORIDE 200 MG: 200 TABLET, FILM COATED ORAL at 18:03

## 2021-05-13 RX ADMIN — BUPROPION HYDROCHLORIDE 150 MG: 150 TABLET, EXTENDED RELEASE ORAL at 07:46

## 2021-05-13 RX ADMIN — Medication 1 TABLET: at 08:43

## 2021-05-13 RX ADMIN — LABETALOL HYDROCHLORIDE 200 MG: 200 TABLET, FILM COATED ORAL at 08:43

## 2021-05-13 RX ADMIN — FAMOTIDINE 20 MG: 20 TABLET, FILM COATED ORAL at 17:59

## 2021-05-13 NOTE — PROGRESS NOTES
5/13/2021  11:33 AM    CM rounded on pt for further questions/concerns. Pt noted no further needs at this time. Plan for delivery @ 37 wks.   CM following for needs    [employer]

## 2021-05-13 NOTE — PROGRESS NOTES
Antepartum Obstetrics Progress Note    Name: Devang Tucker MRN: [id number]  SSN: [id number]    YOB: 1990  Age: 27 y.o. Sex: female      Subjective:      LOS: 15 days    Estimated Date of Delivery: 21   Gestational Age Today: 37w1d     Patient admitted for superimposed pre-eclampsia. Admitted . Tx'd with IV labetalol and scheduled labetalol increased from 100mg BID to 200mg BID. Taking 81mg ASA. Completed BMZ -    Has had constant HA since admission. Is actually a little bit better today. +FM. No abd pain unless johnathan or baby kicks her. No visual changes, no swelling. Objective:     Vitals:  Blood pressure (!) 154/87, pulse 85, temperature 98.2 °F (36.8 °C), resp. rate 16, height 5' 5\" (1.651 m), weight 210 lb (95.3 kg), last menstrual period 2020, SpO2 100 %. Temp (24hrs), Av.2 °F (36.8 °C), Min:98.1 °F (36.7 °C), Max:98.3 °F (63.8 °C)    Systolic (69NHV), ZACKERY:922 , Min:131 , RSB:216      Diastolic (86CWM), PPL:77, Min:78, Max:103       Intake and Output:         Physical Exam:  Patient without distress.   Heart: Regular rate and rhythm or S1S2 present  Lung: clear to auscultation throughout lung fields, no wheezes, no rales, no rhonchi and normal respiratory effort  Abdomen: soft, nontender  Fundus: soft and non tender  Lower Extremities:  - Edema No   - No cords or calf tenderness.   - Patellar Reflexes: 1+ bilaterally   - Clonus: absent       Membranes:  Intact    Fetal Heart Rate:  Reactive  Baseline: 135 per minute  Variability: moderate  Accelerations: yes  Decelerations: none  occ ctx  25min tracing reviewed          Labs:   Recent Results (from the past 36 hour(s))   CBC WITH AUTOMATED DIFF    Collection Time: 21  4:45 AM   Result Value Ref Range    WBC 10.0 3.6 - 11.0 K/uL    RBC 3.84 3.80 - 5.20 M/uL    HGB 11.3 (L) 11.5 - 16.0 g/dL    HCT 33.9 (L) 35.0 - 47.0 %    MCV 88.3 80.0 - 99.0 FL    MCH 29.4 26.0 - 34.0 PG    MCHC 33.3 30.0 - 36.5 g/dL    RDW 13.8 11.5 - 14.5 %    PLATELET 476 (L) 037 - 400 K/uL    NRBC 0.0 0  WBC    ABSOLUTE NRBC 0.00 0.00 - 0.01 K/uL    NEUTROPHILS 64 32 - 75 %    LYMPHOCYTES 23 12 - 49 %    MONOCYTES 9 5 - 13 %    EOSINOPHILS 2 0 - 7 %    BASOPHILS 1 0 - 1 %    IMMATURE GRANULOCYTES 2 (H) 0.0 - 0.5 %    ABS. NEUTROPHILS 6.3 1.8 - 8.0 K/UL    ABS. LYMPHOCYTES 2.3 0.8 - 3.5 K/UL    ABS. MONOCYTES 0.9 0.0 - 1.0 K/UL    ABS. EOSINOPHILS 0.2 0.0 - 0.4 K/UL    ABS. BASOPHILS 0.1 0.0 - 0.1 K/UL    ABS. IMM. GRANS. 0.2 (H) 0.00 - 0.04 K/UL    DF AUTOMATED     METABOLIC PANEL, COMPREHENSIVE    Collection Time: 21  4:45 AM   Result Value Ref Range    Sodium 140 136 - 145 mmol/L    Potassium 4.0 3.5 - 5.1 mmol/L    Chloride 108 97 - 108 mmol/L    CO2 21 21 - 32 mmol/L    Anion gap 11 5 - 15 mmol/L    Glucose 82 65 - 100 mg/dL    BUN 7 6 - 20 MG/DL    Creatinine 0.53 (L) 0.55 - 1.02 MG/DL    BUN/Creatinine ratio 13 12 - 20      GFR est AA >60 >60 ml/min/1.73m2    GFR est non-AA >60 >60 ml/min/1.73m2    Calcium 8.2 (L) 8.5 - 10.1 MG/DL    Bilirubin, total 0.3 0.2 - 1.0 MG/DL    ALT (SGPT) 19 12 - 78 U/L    AST (SGOT) 17 15 - 37 U/L    Alk. phosphatase 162 (H) 45 - 117 U/L    Protein, total 5.5 (L) 6.4 - 8.2 g/dL    Albumin 2.4 (L) 3.5 - 5.0 g/dL    Globulin 3.1 2.0 - 4.0 g/dL    A-G Ratio 0.8 (L) 1.1 - 2.2         Assessment and Plan: Active Problems:    Hypertension in pregnancy (4/15/2021)       32yo  @ 36+2. Superimposed pre-e. On labetalol 200mg BID.  BPs 140s-150s/80s-90s  Completed BMZ -  Qsz=519  - cont inpt management, current care with close observation  - plan is for IOL @ 37wks      Signed By: Ирина Jeffery MD     May 13, 2021

## 2021-05-13 NOTE — PROGRESS NOTES
[address] bedside blood pressure noted to be 172/111.    1803 Scheduled labetalol administered. 200 RN updates Dr. Sis Bergman of treatable BP and that medication was administered. MD gives verbal orders to repeat blood pressure in 30 minutes from administration of labetalol. 1830 Repeat blood pressure noted to be 155/94.    1840 Patient off the unit via wheelchair with .

## 2021-05-13 NOTE — PROGRESS NOTES
7:16 PM  SBAR report received from Reilly Ghosh RN. Assumed care off the patient at this time. 7:45 PM  Assessment with VS done. NST started. Patient without needs at this time. 8:06 PM  NST completed. Patient has no needs at this time.

## 2021-05-13 NOTE — PROGRESS NOTES
0710: Bedside and Verbal shift change report given to [facility] 66. (oncoming nurse) by TAMMY Drummond RN (offgoing nurse). Report included the following information SBAR, Kardex, Intake/Output, MAR, Recent Results and Med Rec Status. 1915: Bedside and Verbal shift change report given to [address] [place] [address] Drive (oncoming nurse) by Gokul Fang RN (offgoing nurse). Report included the following information SBAR, Kardex, Intake/Output, MAR, Recent Results and Med Rec Status.

## 2021-05-14 PROCEDURE — 65270000029 HC RM PRIVATE

## 2021-05-14 PROCEDURE — 74011250637 HC RX REV CODE- 250/637: Performed by: OBSTETRICS & GYNECOLOGY

## 2021-05-14 PROCEDURE — 99231 SBSQ HOSP IP/OBS SF/LOW 25: CPT | Performed by: OBSTETRICS & GYNECOLOGY

## 2021-05-14 RX ADMIN — LABETALOL HYDROCHLORIDE 200 MG: 200 TABLET, FILM COATED ORAL at 17:08

## 2021-05-14 RX ADMIN — BUTALBITAL, ACETAMINOPHEN, AND CAFFEINE 1 TABLET: 50; 325; 40 TABLET ORAL at 14:27

## 2021-05-14 RX ADMIN — CALCIUM CARBONATE (ANTACID) CHEW TAB 500 MG 400 MG: 500 CHEW TAB at 22:20

## 2021-05-14 RX ADMIN — LABETALOL HYDROCHLORIDE 200 MG: 200 TABLET, FILM COATED ORAL at 08:28

## 2021-05-14 RX ADMIN — ACETAMINOPHEN 650 MG: 325 TABLET ORAL at 10:46

## 2021-05-14 RX ADMIN — BUPROPION HYDROCHLORIDE 150 MG: 150 TABLET, EXTENDED RELEASE ORAL at 08:28

## 2021-05-14 RX ADMIN — Medication 1 TABLET: at 08:28

## 2021-05-14 RX ADMIN — Medication 81 MG: at 08:28

## 2021-05-14 RX ADMIN — BUTALBITAL, ACETAMINOPHEN, AND CAFFEINE 1 TABLET: 50; 325; 40 TABLET ORAL at 19:10

## 2021-05-14 NOTE — PROGRESS NOTES
S/ feels better with less HA, +FM    O/ 148/96  BP max 172/111 one time yesterday    Abd: gravid, NT  DTR: 1/4  Ext: no edema    A/CHTN with SPIH stable on po labetalol with an occasion BP spike    P/ Continue Labetalol.  Induction planned next week

## 2021-05-14 NOTE — PROGRESS NOTES
Visit was a follow up visit in L&D. Pt, Miss Jonh Bolivar was in  Bed and appeared in good spirit.  initiated conversation, and also explored coping resources for support. She welcomed  warmly and engaged in conversation, stating she was grateful for all the love and support from family and staff. She stated she's been here so long that staff has become family too. Her  spends his evenings with her. She expressed no need for spiritual support at this time.  affirmed thoughts and emotions and encouraged continuous self care. She thanked  for the visit. Spiritual care is still avilable for support upon request or referrals. Visited by: Marisa Mendieta.    Paging Service: [phone])

## 2021-05-14 NOTE — PROGRESS NOTES
1600-Pt called RN to room stating her IV fell out. Pt took shower, pt removed glove from hand that was covering IV and IV came out when glove was removed. RN will notify primary RN.

## 2021-05-14 NOTE — PROGRESS NOTES
1768 Received report from Anabel Phan RN    1302 Patient off the floor in wheelchair with . 3431 Patient return from [facility]    1928 Bedside and Verbal shift change report given to Devaughn Cox RN (oncoming nurse) by Davy Billings RN (offgoing nurse). Report included the following information SBAR, Kardex and MAR.

## 2021-05-15 LAB
ALBUMIN SERPL-MCNC: 2.6 G/DL (ref 3.5–5)
ALBUMIN/GLOB SERPL: 0.9 {RATIO} (ref 1.1–2.2)
ALP SERPL-CCNC: 170 U/L (ref 45–117)
ALT SERPL-CCNC: 18 U/L (ref 12–78)
ANION GAP SERPL CALC-SCNC: 6 MMOL/L (ref 5–15)
AST SERPL-CCNC: 16 U/L (ref 15–37)
BASOPHILS # BLD: 0 K/UL (ref 0–0.1)
BASOPHILS NFR BLD: 0 % (ref 0–1)
BILIRUB SERPL-MCNC: 0.3 MG/DL (ref 0.2–1)
BUN SERPL-MCNC: 8 MG/DL (ref 6–20)
BUN/CREAT SERPL: 13 (ref 12–20)
CALCIUM SERPL-MCNC: 8.1 MG/DL (ref 8.5–10.1)
CHLORIDE SERPL-SCNC: 107 MMOL/L (ref 97–108)
CO2 SERPL-SCNC: 25 MMOL/L (ref 21–32)
CREAT SERPL-MCNC: 0.6 MG/DL (ref 0.55–1.02)
DIFFERENTIAL METHOD BLD: ABNORMAL
EOSINOPHIL # BLD: 0.2 K/UL (ref 0–0.4)
EOSINOPHIL NFR BLD: 2 % (ref 0–7)
ERYTHROCYTE [DISTWIDTH] IN BLOOD BY AUTOMATED COUNT: 13.9 % (ref 11.5–14.5)
GLOBULIN SER CALC-MCNC: 3 G/DL (ref 2–4)
GLUCOSE SERPL-MCNC: 80 MG/DL (ref 65–100)
HCT VFR BLD AUTO: 33.7 % (ref 35–47)
HGB BLD-MCNC: 11.5 G/DL (ref 11.5–16)
IMM GRANULOCYTES # BLD AUTO: 0.1 K/UL (ref 0–0.04)
IMM GRANULOCYTES NFR BLD AUTO: 1 % (ref 0–0.5)
LYMPHOCYTES # BLD: 1.6 K/UL (ref 0.8–3.5)
LYMPHOCYTES NFR BLD: 16 % (ref 12–49)
MCH RBC QN AUTO: 30.3 PG (ref 26–34)
MCHC RBC AUTO-ENTMCNC: 34.1 G/DL (ref 30–36.5)
MCV RBC AUTO: 88.7 FL (ref 80–99)
MONOCYTES # BLD: 0.7 K/UL (ref 0–1)
MONOCYTES NFR BLD: 7 % (ref 5–13)
NEUTS SEG # BLD: 7.4 K/UL (ref 1.8–8)
NEUTS SEG NFR BLD: 74 % (ref 32–75)
NRBC # BLD: 0 K/UL (ref 0–0.01)
NRBC BLD-RTO: 0 PER 100 WBC
PLATELET # BLD AUTO: 145 K/UL (ref 150–400)
PMV BLD AUTO: 12.7 FL (ref 8.9–12.9)
POTASSIUM SERPL-SCNC: 4.3 MMOL/L (ref 3.5–5.1)
PROT SERPL-MCNC: 5.6 G/DL (ref 6.4–8.2)
RBC # BLD AUTO: 3.8 M/UL (ref 3.8–5.2)
SODIUM SERPL-SCNC: 138 MMOL/L (ref 136–145)
WBC # BLD AUTO: 10.1 K/UL (ref 3.6–11)

## 2021-05-15 PROCEDURE — 99231 SBSQ HOSP IP/OBS SF/LOW 25: CPT | Performed by: OBSTETRICS & GYNECOLOGY

## 2021-05-15 PROCEDURE — 59025 FETAL NON-STRESS TEST: CPT

## 2021-05-15 PROCEDURE — 59025 FETAL NON-STRESS TEST: CPT | Performed by: OBSTETRICS & GYNECOLOGY

## 2021-05-15 PROCEDURE — 80053 COMPREHEN METABOLIC PANEL: CPT

## 2021-05-15 PROCEDURE — 36415 COLL VENOUS BLD VENIPUNCTURE: CPT

## 2021-05-15 PROCEDURE — 65270000029 HC RM PRIVATE

## 2021-05-15 PROCEDURE — 85025 COMPLETE CBC W/AUTO DIFF WBC: CPT

## 2021-05-15 PROCEDURE — 74011250637 HC RX REV CODE- 250/637: Performed by: OBSTETRICS & GYNECOLOGY

## 2021-05-15 RX ORDER — BUTALBITAL, ACETAMINOPHEN AND CAFFEINE 50; 325; 40 MG/1; MG/1; MG/1
1 TABLET ORAL
Status: DISCONTINUED | OUTPATIENT
Start: 2021-05-15 | End: 2021-05-21 | Stop reason: HOSPADM

## 2021-05-15 RX ORDER — CYCLOBENZAPRINE HCL 10 MG
10 TABLET ORAL
Status: DISCONTINUED | OUTPATIENT
Start: 2021-05-15 | End: 2021-05-21 | Stop reason: HOSPADM

## 2021-05-15 RX ADMIN — CYCLOBENZAPRINE 10 MG: 10 TABLET, FILM COATED ORAL at 20:47

## 2021-05-15 RX ADMIN — LABETALOL HYDROCHLORIDE 200 MG: 200 TABLET, FILM COATED ORAL at 08:19

## 2021-05-15 RX ADMIN — BUTALBITAL, ACETAMINOPHEN, AND CAFFEINE 1 TABLET: 50; 325; 40 TABLET ORAL at 18:13

## 2021-05-15 RX ADMIN — LABETALOL HYDROCHLORIDE 200 MG: 200 TABLET, FILM COATED ORAL at 18:13

## 2021-05-15 RX ADMIN — CYCLOBENZAPRINE 10 MG: 10 TABLET, FILM COATED ORAL at 11:25

## 2021-05-15 RX ADMIN — Medication 1 TABLET: at 08:18

## 2021-05-15 RX ADMIN — BUTALBITAL, ACETAMINOPHEN, AND CAFFEINE 1 TABLET: 50; 325; 40 TABLET ORAL at 23:06

## 2021-05-15 RX ADMIN — Medication 81 MG: at 08:19

## 2021-05-15 RX ADMIN — BUPROPION HYDROCHLORIDE 150 MG: 150 TABLET, EXTENDED RELEASE ORAL at 08:19

## 2021-05-15 NOTE — PROGRESS NOTES
Ante Partum Progress Note    Crystal Reach  36w4d  LOS: 17      Patient states she reports HA improved but having more neck and shoulder pain. Has taken flexeril for migraines in past  No CP/SOB/vision changes. Bigger FM. White VD. Vitals:  Patient Vitals for the past 24 hrs:   Temp Pulse Resp BP   05/15/21 0818 -- 84 -- (!) 150/100   05/15/21 0242 -- 78 -- (!) 145/84   21 98.8 °F (37.1 °C) 80 16 (!) 152/86   21 1708 -- -- -- (!) 147/97   21 1430 99 °F (37.2 °C) 82 16 (!) 149/94       Temp (24hrs), Av.9 °F (37.2 °C), Min:98.8 °F (37.1 °C), Max:99 °F (37.2 °C)        Last 24hr Input/Output:  No intake or output data in the 24 hours ending 05/15/21 0834   Exam:    General: Patient without distress. Abdomen: soft, non-tender  Fundus: soft, gravid, non-tender  Extremities: no redness or tenderness or cords          Labs:   No results found for this or any previous visit (from the past 24 hour(s)). Assessment:   27 y.o.  36w4d   SIPIH   Neck shoulder pain suspect muscular etiology   Sp BMZ x2    Plan:   PIH labs  Plan IOL 37 wks  Add flexeril  On this date, 5/15/2021,  I have spent 20 minutes reviewing previous notes, examining the patient, reviewing test results and face to face time with the patient discussing the diagnosis, plan of care and importance of compliance with the treatment plan and perfroming an exam.  We reviewed the planned hospital course as well as documented on the day of the hospitalization. Toni Otoole MD    NST Inpatient Procedure Note    Crystal Rizvi presents for fetal non-stress test.    Indication is [facility]    May 14, 2200     She is 36w4d. She has been monitored for more than 30 minutes. The FHR was reactive. NST Interpretation:   FHR baseline 120 bpm,   Variability:  moderate  Accelerations:  present  Decelerations Absent. Uterine contractions:  absent    Assessment  NST is reactive. NST is reassuring.     Patient does need admission/observation for further monitoring. Jose Dietz was informed of the NST results and her questions were answered. Plan:    [x] Continue admission on Labor and Delivery    [] Continue observation on Labor and Delivery   [] Keep routine OB follow up upon discharge   [] Reviewed fetal movement kick counts, notify MD if decreased   [] Continue continuous fetal monitoring.    []

## 2021-05-15 NOTE — PROGRESS NOTES
Problem: Falls - Risk of  Goal: *Absence of Falls  Description: Document Keisha Ruts Fall Risk and appropriate interventions in the flowsheet.   Outcome: Progressing Towards Goal  Note: Fall Risk Interventions:            Medication Interventions: Teach patient to arise slowly

## 2021-05-15 NOTE — PROGRESS NOTES
3973: Bedside and Verbal shift change report given to BREANA Dixon RN (oncoming nurse) by Jian Boateng RN (offgoing nurse). Report included the following information SBAR, Kardex, Procedure Summary, Intake/Output, MAR, Recent Results, Med Rec Status and Quality Measures. 7390: Pt given scheduled medications at this time. Pt refusing PRN flexeril and colace. Dr. Jens Arizmendi at bedside assessing pt. Pt c/o R neck and shoulder pain. Dr. Jens Arizmendi states that she will order flexeril with pt to see if it will help reduce pain. Pt agreeable, states that she has taken prior for migraines with relief. 0830: CBC and CMP drawn at this time and sent to lab. Pt currently eating bfast, will plan for NST after pt done eating. 1125: Flexeril given at this time for R shoulder discomfort. Pt emotional over job, this RN listening and speaking with pt, pt seems to be doing better. This RN spoke with pt regarding plan for NST after lunch- pt agreeable. 1336: EFM/TOCO removed at this time after verifying reactive NST with Lore Joshi RN. 1813: Pt given fioricet at this time for HA. SO at bedside. 1900: Bedside and Verbal shift change report given to TIESHA Hardwick RN (oncoming nurse) by Jackie Andrews RN (offgoing nurse). Report included the following information SBAR, Kardex, Procedure Summary, Intake/Output, MAR, Recent Results, Med Rec Status and Quality Measures.

## 2021-05-15 NOTE — PROGRESS NOTES
Visit Vitals  BP (!) 150/100   Pulse 84   Temp 98.5 °F (36.9 °C)   Resp 14   Ht 5' 5\" (1.651 m)   Wt 210 lb (95.3 kg)   SpO2 100%   BMI 34.95 kg/m²     Recent Results (from the past 12 hour(s))   CBC WITH AUTOMATED DIFF    Collection Time: 05/15/21  8:35 AM   Result Value Ref Range    WBC 10.1 3.6 - 11.0 K/uL    RBC 3.80 3.80 - 5.20 M/uL    HGB 11.5 11.5 - 16.0 g/dL    HCT 33.7 (L) 35.0 - 47.0 %    MCV 88.7 80.0 - 99.0 FL    MCH 30.3 26.0 - 34.0 PG    MCHC 34.1 30.0 - 36.5 g/dL    RDW 13.9 11.5 - 14.5 %    PLATELET 930 (L) 412 - 400 K/uL    MPV 12.7 8.9 - 12.9 FL    NRBC 0.0 0  WBC    ABSOLUTE NRBC 0.00 0.00 - 0.01 K/uL    NEUTROPHILS 74 32 - 75 %    LYMPHOCYTES 16 12 - 49 %    MONOCYTES 7 5 - 13 %    EOSINOPHILS 2 0 - 7 %    BASOPHILS 0 0 - 1 %    IMMATURE GRANULOCYTES 1 (H) 0.0 - 0.5 %    ABS. NEUTROPHILS 7.4 1.8 - 8.0 K/UL    ABS. LYMPHOCYTES 1.6 0.8 - 3.5 K/UL    ABS. MONOCYTES 0.7 0.0 - 1.0 K/UL    ABS. EOSINOPHILS 0.2 0.0 - 0.4 K/UL    ABS. BASOPHILS 0.0 0.0 - 0.1 K/UL    ABS. IMM. GRANS. 0.1 (H) 0.00 - 0.04 K/UL    DF AUTOMATED     METABOLIC PANEL, COMPREHENSIVE    Collection Time: 05/15/21  8:35 AM   Result Value Ref Range    Sodium 138 136 - 145 mmol/L    Potassium 4.3 3.5 - 5.1 mmol/L    Chloride 107 97 - 108 mmol/L    CO2 25 21 - 32 mmol/L    Anion gap 6 5 - 15 mmol/L    Glucose 80 65 - 100 mg/dL    BUN 8 6 - 20 MG/DL    Creatinine 0.60 0.55 - 1.02 MG/DL    BUN/Creatinine ratio 13 12 - 20      GFR est AA >60 >60 ml/min/1.73m2    GFR est non-AA >60 >60 ml/min/1.73m2    Calcium 8.1 (L) 8.5 - 10.1 MG/DL    Bilirubin, total 0.3 0.2 - 1.0 MG/DL    ALT (SGPT) 18 12 - 78 U/L    AST (SGOT) 16 15 - 37 U/L    Alk.  phosphatase 170 (H) 45 - 117 U/L    Protein, total 5.6 (L) 6.4 - 8.2 g/dL    Albumin 2.6 (L) 3.5 - 5.0 g/dL    Globulin 3.0 2.0 - 4.0 g/dL    A-G Ratio 0.9 (L) 1.1 - 2.2       Low platelets, stable      5/15/2021 1300 nst    NST Inpatient Procedure Note    Laura Orta presents for fetal non-stress test.    Indication is SIPIH. She is 36w4d. She has been monitored for more than 30 minutes. The FHR was reactive. NST Interpretation:   FHR baseline 130 bpm,   Variability:  moderate  Accelerations:  present  Decelerations Absent. Uterine contractions:  absent    Assessment  NST is reactive. NST is reassuring. Patient does need admission/observation for further monitoring. Toshia Rodriguez was informed of the NST results and her questions were answered. Plan:    [] Continue admission on Labor and Delivery    [] Continue observation on Labor and Delivery   [] Keep routine OB follow up upon discharge   [] Reviewed fetal movement kick counts, notify MD if decreased   [] Continue continuous fetal monitoring. []        On this date, 5/15/2021,  I have spent 10 minutes reviewing previous notes, test results.

## 2021-05-16 PROCEDURE — 65270000029 HC RM PRIVATE

## 2021-05-16 PROCEDURE — 59025 FETAL NON-STRESS TEST: CPT | Performed by: OBSTETRICS & GYNECOLOGY

## 2021-05-16 PROCEDURE — 74011250637 HC RX REV CODE- 250/637: Performed by: OBSTETRICS & GYNECOLOGY

## 2021-05-16 PROCEDURE — 99231 SBSQ HOSP IP/OBS SF/LOW 25: CPT | Performed by: OBSTETRICS & GYNECOLOGY

## 2021-05-16 RX ADMIN — LABETALOL HYDROCHLORIDE 200 MG: 200 TABLET, FILM COATED ORAL at 17:06

## 2021-05-16 RX ADMIN — BUPROPION HYDROCHLORIDE 150 MG: 150 TABLET, EXTENDED RELEASE ORAL at 08:20

## 2021-05-16 RX ADMIN — LABETALOL HYDROCHLORIDE 200 MG: 200 TABLET, FILM COATED ORAL at 08:18

## 2021-05-16 RX ADMIN — CYCLOBENZAPRINE 10 MG: 10 TABLET, FILM COATED ORAL at 08:19

## 2021-05-16 RX ADMIN — CALCIUM CARBONATE (ANTACID) CHEW TAB 500 MG 400 MG: 500 CHEW TAB at 21:04

## 2021-05-16 RX ADMIN — Medication 1 TABLET: at 08:20

## 2021-05-16 RX ADMIN — CYCLOBENZAPRINE 10 MG: 10 TABLET, FILM COATED ORAL at 17:06

## 2021-05-16 RX ADMIN — Medication 81 MG: at 08:19
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
## 2021-05-16 NOTE — PROGRESS NOTES
Problem: Hypertensive Disorders of Pregnancy Care Plan (Gestational HTN, Preeclampsia, HELLP syndrome, Eclampsia, Chronic HTN, Superimposed Preeclamsia)  Goal: *Blood pressure within specified parameters  5/16/2021 0243 by Chula Kelley Outcome: Progressing Towards Goal  5/15/2021 1951 by Melissa Gutierrez   Outcome: Progressing Towards Goal

## 2021-05-16 NOTE — PROGRESS NOTES
1900  Bedside and Verbal shift change report given to CHONG Hardwick RN (oncoming nurse) by Bessie Butt RN (offgoing nurse). Report included the following information SBAR, Kardex, Intake/Output, MAR, Accordion, Recent Results and Med Rec Status. 1930  Patient is wheeled off by  to go outside. 2047  Reactive NST recorded. Pain medication administered. Patient will be taking a shower. 9272  Bedside and Verbal shift change report given to ANTOINETTE Prakash RN (oncoming nurse) by Agustin George RN (offgoing nurse). Report included the following information SBAR, Kardex, Procedure Summary, Intake/Output, MAR, Accordion, Recent Results and Med Rec Status.

## 2021-05-16 NOTE — PROGRESS NOTES
7419: SBAR report received from Norbert Saenz RN    1100: Dr. Jennifer Alegria to bedside to evaluate patient. 1111: Verbal orders for topical [place] to pt's left shoulder/neck area and PT for tomorrow. 1841: Pt off unit in wheelchair with . 1915: SBAR report given to ROSLYN Rosales RN

## 2021-05-17 LAB
ALBUMIN SERPL-MCNC: 2.5 G/DL (ref 3.5–5)
ALBUMIN/GLOB SERPL: 0.8 {RATIO} (ref 1.1–2.2)
ALP SERPL-CCNC: 176 U/L (ref 45–117)
ALT SERPL-CCNC: 16 U/L (ref 12–78)
ANION GAP SERPL CALC-SCNC: 6 MMOL/L (ref 5–15)
AST SERPL-CCNC: 16 U/L (ref 15–37)
BASOPHILS # BLD: 0 K/UL (ref 0–0.1)
BASOPHILS NFR BLD: 0 % (ref 0–1)
BILIRUB SERPL-MCNC: 0.2 MG/DL (ref 0.2–1)
BUN SERPL-MCNC: 10 MG/DL (ref 6–20)
BUN/CREAT SERPL: 20 (ref 12–20)
CALCIUM SERPL-MCNC: 8.5 MG/DL (ref 8.5–10.1)
CHLORIDE SERPL-SCNC: 109 MMOL/L (ref 97–108)
CO2 SERPL-SCNC: 24 MMOL/L (ref 21–32)
CREAT SERPL-MCNC: 0.51 MG/DL (ref 0.55–1.02)
DIFFERENTIAL METHOD BLD: ABNORMAL
EOSINOPHIL # BLD: 0.2 K/UL (ref 0–0.4)
EOSINOPHIL NFR BLD: 2 % (ref 0–7)
ERYTHROCYTE [DISTWIDTH] IN BLOOD BY AUTOMATED COUNT: 13.6 % (ref 11.5–14.5)
GLOBULIN SER CALC-MCNC: 3 G/DL (ref 2–4)
GLUCOSE SERPL-MCNC: 83 MG/DL (ref 65–100)
HCT VFR BLD AUTO: 34.5 % (ref 35–47)
HGB BLD-MCNC: 11.3 G/DL (ref 11.5–16)
IMM GRANULOCYTES # BLD AUTO: 0.2 K/UL (ref 0–0.04)
IMM GRANULOCYTES NFR BLD AUTO: 2 % (ref 0–0.5)
LYMPHOCYTES # BLD: 1.7 K/UL (ref 0.8–3.5)
LYMPHOCYTES NFR BLD: 17 % (ref 12–49)
MCH RBC QN AUTO: 29.4 PG (ref 26–34)
MCHC RBC AUTO-ENTMCNC: 32.8 G/DL (ref 30–36.5)
MCV RBC AUTO: 89.8 FL (ref 80–99)
MONOCYTES # BLD: 0.6 K/UL (ref 0–1)
MONOCYTES NFR BLD: 6 % (ref 5–13)
NEUTS SEG # BLD: 7.3 K/UL (ref 1.8–8)
NEUTS SEG NFR BLD: 73 % (ref 32–75)
NRBC # BLD: 0 K/UL (ref 0–0.01)
NRBC BLD-RTO: 0 PER 100 WBC
PLATELET # BLD AUTO: 140 K/UL (ref 150–400)
POTASSIUM SERPL-SCNC: 4 MMOL/L (ref 3.5–5.1)
PROT SERPL-MCNC: 5.5 G/DL (ref 6.4–8.2)
RBC # BLD AUTO: 3.84 M/UL (ref 3.8–5.2)
SODIUM SERPL-SCNC: 139 MMOL/L (ref 136–145)
WBC # BLD AUTO: 10 K/UL (ref 3.6–11)

## 2021-05-17 PROCEDURE — 77010026064 HC OXYGEN INFANT MED AIR MIN: Performed by: OBSTETRICS & GYNECOLOGY

## 2021-05-17 PROCEDURE — 85025 COMPLETE CBC W/AUTO DIFF WBC: CPT

## 2021-05-17 PROCEDURE — 59200 INSERT CERVICAL DILATOR: CPT | Performed by: OBSTETRICS & GYNECOLOGY

## 2021-05-17 PROCEDURE — 36415 COLL VENOUS BLD VENIPUNCTURE: CPT

## 2021-05-17 PROCEDURE — 74011250636 HC RX REV CODE- 250/636: Performed by: OBSTETRICS & GYNECOLOGY

## 2021-05-17 PROCEDURE — 97161 PT EVAL LOW COMPLEX 20 MIN: CPT

## 2021-05-17 PROCEDURE — 80053 COMPREHEN METABOLIC PANEL: CPT

## 2021-05-17 PROCEDURE — 76816 OB US FOLLOW-UP PER FETUS: CPT | Performed by: OBSTETRICS & GYNECOLOGY

## 2021-05-17 PROCEDURE — 97110 THERAPEUTIC EXERCISES: CPT

## 2021-05-17 PROCEDURE — 75410000002 HC LABOR FEE PER 1 HR: Performed by: OBSTETRICS & GYNECOLOGY

## 2021-05-17 PROCEDURE — 76819 FETAL BIOPHYS PROFIL W/O NST: CPT | Performed by: OBSTETRICS & GYNECOLOGY

## 2021-05-17 PROCEDURE — 74011250637 HC RX REV CODE- 250/637: Performed by: OBSTETRICS & GYNECOLOGY

## 2021-05-17 PROCEDURE — 65270000029 HC RM PRIVATE

## 2021-05-17 RX ORDER — BUTORPHANOL TARTRATE 2 MG/ML
1 INJECTION INTRAMUSCULAR; INTRAVENOUS
Status: DISCONTINUED | OUTPATIENT
Start: 2021-05-17 | End: 2021-05-18 | Stop reason: HOSPADM

## 2021-05-17 RX ORDER — OXYTOCIN/RINGER'S LACTATE 30/500 ML
0-42 PLASTIC BAG, INJECTION (ML) INTRAVENOUS
Status: DISCONTINUED | OUTPATIENT
Start: 2021-05-17 | End: 2021-05-17

## 2021-05-17 RX ORDER — OXYTOCIN/RINGER'S LACTATE 30/500 ML
0-42 PLASTIC BAG, INJECTION (ML) INTRAVENOUS
Status: DISCONTINUED | OUTPATIENT
Start: 2021-05-18 | End: 2021-05-18

## 2021-05-17 RX ADMIN — LABETALOL HYDROCHLORIDE 200 MG: 200 TABLET, FILM COATED ORAL at 08:55

## 2021-05-17 RX ADMIN — MENTHOL, METHYL SALICYLATE: 10; 15 CREAM TOPICAL at 21:39

## 2021-05-17 RX ADMIN — BUPROPION HYDROCHLORIDE 150 MG: 150 TABLET, EXTENDED RELEASE ORAL at 07:25

## 2021-05-17 RX ADMIN — LABETALOL HYDROCHLORIDE 200 MG: 200 TABLET, FILM COATED ORAL at 18:27

## 2021-05-17 RX ADMIN — Medication 1 TABLET: at 08:55

## 2021-05-17 RX ADMIN — Medication 81 MG: at 08:55

## 2021-05-17 RX ADMIN — CALCIUM CARBONATE (ANTACID) CHEW TAB 500 MG 400 MG: 500 CHEW TAB at 20:51

## 2021-05-17 RX ADMIN — BUTORPHANOL TARTRATE 1 MG: 2 INJECTION, SOLUTION INTRAMUSCULAR; INTRAVENOUS at 18:51

## 2021-05-17 RX ADMIN — BUTORPHANOL TARTRATE 1 MG: 2 INJECTION, SOLUTION INTRAMUSCULAR; INTRAVENOUS at 22:05

## 2021-05-17 RX ADMIN — MENTHOL, METHYL SALICYLATE: 10; 15 CREAM TOPICAL at 10:16

## 2021-05-17 NOTE — PROGRESS NOTES
1915: Bedside, Verbal and Written shift change report given to TAMMY Massey RN (oncoming nurse) by Sandi Worthington RN (offgoing nurse). Report included the following information SBAR, Kardex, Procedure Summary, Intake/Output, MAR, Accordion, Recent Results and Med Rec Status. [phone]: This RN rounding on pt bedside. Pt sleeping with symmetrical bilateral chest rise and fall. FOB at pt bedside watching tv. RN to return shortly to continue pt assessment. Pt continuing to monitor EFM/TOCO tracing. Call light in reach. [phone]: This RN at pt bedside continuing to assess pt. Pt laying right lateral in position of comfort with FOB at pt bedside. Pt denies pain but reports moderate indigestion and requests Tums. Pt reporting positive fetal movement and intermittent tightening. RNs continuing to adjust/palpate EFM/TOCO monitors d/t difficulty tracing FHR and contractions. Call light in reach. 2053: RN at pt bedside continuing to monitor EFM/TOCO tracing and repositioning pt d/t FHR decelerations. Pt tolerating. Call light in reach. 2117: This RN calling Dennys ANGLIN to update on pt status and EFM/TOCO tracing. MD reviewing strip at this time. [id number] Skill MD calling this RN. MD TORB RN to continue to monitor tracing. RN to hold stadol for now if possible in order to better monitor FHR baseline. This RN verbalizing understanding. [id number]: This RN at pt bedside adjusting EFM/TOCO d/t difficulty tracing with maternal repositioning. Pt denies further needs. Call light in reach. [phone]: Patient ambulatory to and from restroom with steady gait. Urine occurrence reported. RN calling Dennys ANGLIN to update on pt status and report of perineum pain. MD PAULB RN to give prescribed stadol. [phone]: This RN at pt bedside adjusting EFM/TOCO monitors and administering prescribed pain medication per pt request. Pt denies further needs. Call light in reach. [phone]: This RN rounding on pt bedside.  Pt sleeping with symmetrical bilateral chest rise and fall. FOB at pt bedside resting quietly. RN to return shortly to continue pt assessment. Pt continuing to monitor EFM/TOCO tracing. Call light in reach. [phone]: This RN at pt bedside assisting pt with repositioning d/t FHR deceleration. RN continuing to monitor and will update MD on pt status and EFM/TOCO tracing. 0030: RN updating MD on pt status. MD will return shortly to review pt tracing. 0045: This RN updating Dennys ANGLIN on pt status and EFM/TOCO tracing. MD reviewing tracing. MD verbalizing understanding and Tiffany Babin RN to hold ordered cytotec at this time d/t FHR deceleration. RN to continue to monitor tracing and allow pt to have ordered stadol for pain as needed. RN verbalizing understanding. 2096-4181: This RN at pt bedside assisting pt with repositioning d/t FHR intervention. RN administering prescribed abx and pain medication. RN frequently adjusting EFM and TOCO monitors d/t maternal repositioning. Call light in reach. 3613-5488: This RN at pt bedside rounding on pt and adjusting TOCO monitor after maternal ambulation to the bathroom. Pt denies further needs. Call light in reach. 0022-4878: This RN at pt bedside performing assessment and beginning prescribed pitocin titration. This RN educating pt and FOB on pitocin administration. Pt and FOB deny further questions. RN to continue to monitor EFM/TOCO tracing. Call light in reach.     1517-2792:Patient ambulatory to and from restroom with steady gait. FOB at pt side assisting with IV pole. Urine occurrence reported. 0570-0065: This RN at pt bedside continuing to adjust EFM/TOCO monitors. EFM/TOCO difficult to trace d/t frequent maternal repositioning. Pt reports \"barely feeling\" intermittent uterine contractions within the last hour. Pt denies further needs. Call light in reach. 0720:  Bedside, Verbal and Written shift change report given to Yolanda RN (oncoming nurse) by Judi Vogel RN (offgoing nurse). Report included the following information SBAR, Kardex, Procedure Summary, Intake/Output, MAR, Accordion, Recent Results and Med Rec Status.
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
## 2021-05-17 NOTE — PROGRESS NOTES
1915 Bedside shift change report given to Xenia Lawrence RN (oncoming nurse) by Raz Mccoy RN (offgoing nurse). Report included the following information SBAR, Intake/Output, MAR and Recent Results. 2030 RN entered room, pt resting in bed, pt reports positive fetal movement, denies leakage of fluid, vaginal bleeding, denies contractions/cramping. EFMx2 applied for NST.    2103 RN at bedside, efmx2 removed, NST completed. 2 tablets of tums provided per patient request for indigestion. Ginger ale provided. Discussed poc for the night, pt in agreement, no additional needs verbalized at this time. 80 RN entered room, pt resting in bed with eyes closed, easily aroused by RN. Pt denies pain or discomfort. Vital signs obtained. No additional needs verbalized at this time. 7120 RN at bedside, pt resting in bed with eyes closed, easily aroused by RN. Vital signs obtained. AM labs drawn per order. .    0710 Bedside shift change report given to ANTOINETTE Patterson RN (oncoming nurse) by Xenia Lawrence RN (offgoing nurse). Report included the following information SBAR, Intake/Output, MAR and Recent Results.

## 2021-05-17 NOTE — PROGRESS NOTES
1741: Dr. Beti Alfaro at bedside for [facility] placement. SVE per Dr. Beti Alfaro- pt FT.     1745: Dr. Beti Alfaro placing FB at this time suing speculum, sterile technique. 80cc in U/60cc in V. Pt tolerated well. EFM/TOCO placed, difficult to monitor d/t fetal activity.

## 2021-05-17 NOTE — PROGRESS NOTES
8528 SBAR from Mercedes Duncan RN and assumed care at this time  9291 pt sleeping upon entering room, a.m. assessment completed, pt c/o neck and shoulder pain on left side rates 2/10, advised she has consult in for today for PT/OT and pt is in agreement with this plan, BP was taken in sitting position, 0700 meds administered   0854 pt on EFM  0900 MFM called for appt pt [facility]  [facility]  1225 PT in to see pt per consult for neck and shoulder pain  1520 SBAR to Rajeev Lugo RN and released care at this time
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
## 2021-05-18 ENCOUNTER — ANESTHESIA (OUTPATIENT)
Dept: LABOR AND DELIVERY | Age: 31
End: 2021-05-18
Payer: COMMERCIAL

## 2021-05-18 ENCOUNTER — ANESTHESIA EVENT (OUTPATIENT)
Dept: LABOR AND DELIVERY | Age: 31
End: 2021-05-18
Payer: COMMERCIAL

## 2021-05-18 PROCEDURE — 75410000003 HC RECOV DEL/VAG/CSECN EA 0.5 HR: Performed by: OBSTETRICS & GYNECOLOGY

## 2021-05-18 PROCEDURE — 74011250636 HC RX REV CODE- 250/636: Performed by: NURSE ANESTHETIST, CERTIFIED REGISTERED

## 2021-05-18 PROCEDURE — 74011250636 HC RX REV CODE- 250/636

## 2021-05-18 PROCEDURE — 74011250636 HC RX REV CODE- 250/636: Performed by: OBSTETRICS & GYNECOLOGY

## 2021-05-18 PROCEDURE — 76060000078 HC EPIDURAL ANESTHESIA: Performed by: OBSTETRICS & GYNECOLOGY

## 2021-05-18 PROCEDURE — 77030005513 HC CATH URETH FOL11 MDII -B

## 2021-05-18 PROCEDURE — 76010000391 HC C SECN FIRST 1 HR: Performed by: OBSTETRICS & GYNECOLOGY

## 2021-05-18 PROCEDURE — 74011000250 HC RX REV CODE- 250: Performed by: NURSE ANESTHETIST, CERTIFIED REGISTERED

## 2021-05-18 PROCEDURE — 75410000002 HC LABOR FEE PER 1 HR: Performed by: OBSTETRICS & GYNECOLOGY

## 2021-05-18 PROCEDURE — 88307 TISSUE EXAM BY PATHOLOGIST: CPT

## 2021-05-18 PROCEDURE — 74011000250 HC RX REV CODE- 250: Performed by: ANESTHESIOLOGY

## 2021-05-18 PROCEDURE — 74011250636 HC RX REV CODE- 250/636: Performed by: ANESTHESIOLOGY

## 2021-05-18 PROCEDURE — 2709999900 HC NON-CHARGEABLE SUPPLY

## 2021-05-18 PROCEDURE — 77030014125 HC TY EPDRL BBMI -B: Performed by: NURSE ANESTHETIST, CERTIFIED REGISTERED

## 2021-05-18 PROCEDURE — 74011000250 HC RX REV CODE- 250: Performed by: OBSTETRICS & GYNECOLOGY

## 2021-05-18 PROCEDURE — 59515 CESAREAN DELIVERY: CPT | Performed by: OBSTETRICS & GYNECOLOGY

## 2021-05-18 PROCEDURE — 65270000029 HC RM PRIVATE

## 2021-05-18 PROCEDURE — 76010000392 HC C SECN EA ADDL 0.5 HR: Performed by: OBSTETRICS & GYNECOLOGY

## 2021-05-18 PROCEDURE — 74011250636 HC RX REV CODE- 250/636: Performed by: STUDENT IN AN ORGANIZED HEALTH CARE EDUCATION/TRAINING PROGRAM

## 2021-05-18 PROCEDURE — 74011250637 HC RX REV CODE- 250/637: Performed by: OBSTETRICS & GYNECOLOGY

## 2021-05-18 RX ORDER — DIPHENHYDRAMINE HYDROCHLORIDE 50 MG/ML
12.5 INJECTION, SOLUTION INTRAMUSCULAR; INTRAVENOUS
Status: ACTIVE | OUTPATIENT
Start: 2021-05-18 | End: 2021-05-19

## 2021-05-18 RX ORDER — DOCUSATE SODIUM 100 MG/1
100 CAPSULE, LIQUID FILLED ORAL 2 TIMES DAILY
Status: DISCONTINUED | OUTPATIENT
Start: 2021-05-18 | End: 2021-05-21 | Stop reason: HOSPADM

## 2021-05-18 RX ORDER — FENTANYL/BUPIVACAINE/NS/PF 2-1250MCG
10 PREFILLED PUMP RESERVOIR EPIDURAL CONTINUOUS
Status: DISCONTINUED | OUTPATIENT
Start: 2021-05-18 | End: 2021-05-18 | Stop reason: HOSPADM

## 2021-05-18 RX ORDER — SODIUM CHLORIDE 0.9 % (FLUSH) 0.9 %
5-40 SYRINGE (ML) INJECTION AS NEEDED
Status: DISCONTINUED | OUTPATIENT
Start: 2021-05-18 | End: 2021-05-21 | Stop reason: HOSPADM

## 2021-05-18 RX ORDER — ACETAMINOPHEN 325 MG/1
650 TABLET ORAL
Status: DISCONTINUED | OUTPATIENT
Start: 2021-05-18 | End: 2021-05-18 | Stop reason: SDUPTHER

## 2021-05-18 RX ORDER — OXYTOCIN/RINGER'S LACTATE 30/500 ML
0-20 PLASTIC BAG, INJECTION (ML) INTRAVENOUS
Status: DISCONTINUED | OUTPATIENT
Start: 2021-05-18 | End: 2021-05-18 | Stop reason: HOSPADM

## 2021-05-18 RX ORDER — OXYTOCIN 10 [USP'U]/ML
INJECTION, SOLUTION INTRAMUSCULAR; INTRAVENOUS AS NEEDED
Status: DISCONTINUED | OUTPATIENT
Start: 2021-05-18 | End: 2021-05-18 | Stop reason: HOSPADM

## 2021-05-18 RX ORDER — NALBUPHINE HYDROCHLORIDE 10 MG/ML
10 INJECTION, SOLUTION INTRAMUSCULAR; INTRAVENOUS; SUBCUTANEOUS
Status: DISCONTINUED | OUTPATIENT
Start: 2021-05-18 | End: 2021-05-18 | Stop reason: HOSPADM

## 2021-05-18 RX ORDER — MORPHINE SULFATE 0.5 MG/ML
INJECTION, SOLUTION EPIDURAL; INTRATHECAL; INTRAVENOUS AS NEEDED
Status: DISCONTINUED | OUTPATIENT
Start: 2021-05-18 | End: 2021-05-18 | Stop reason: HOSPADM

## 2021-05-18 RX ORDER — OXYTOCIN/RINGER'S LACTATE 30/500 ML
10 PLASTIC BAG, INJECTION (ML) INTRAVENOUS AS NEEDED
Status: DISCONTINUED | OUTPATIENT
Start: 2021-05-18 | End: 2021-05-21 | Stop reason: HOSPADM

## 2021-05-18 RX ORDER — OXYCODONE HYDROCHLORIDE 5 MG/1
10 TABLET ORAL
Status: ACTIVE | OUTPATIENT
Start: 2021-05-18 | End: 2021-05-19

## 2021-05-18 RX ORDER — BUPIVACAINE HYDROCHLORIDE 2.5 MG/ML
INJECTION, SOLUTION EPIDURAL; INFILTRATION; INTRACAUDAL AS NEEDED
Status: DISCONTINUED | OUTPATIENT
Start: 2021-05-18 | End: 2021-05-18 | Stop reason: HOSPADM

## 2021-05-18 RX ORDER — LIDOCAINE HYDROCHLORIDE AND EPINEPHRINE 15; 5 MG/ML; UG/ML
INJECTION, SOLUTION EPIDURAL AS NEEDED
Status: DISCONTINUED | OUTPATIENT
Start: 2021-05-18 | End: 2021-05-18 | Stop reason: HOSPADM

## 2021-05-18 RX ORDER — SODIUM CHLORIDE, SODIUM LACTATE, POTASSIUM CHLORIDE, CALCIUM CHLORIDE 600; 310; 30; 20 MG/100ML; MG/100ML; MG/100ML; MG/100ML
125 INJECTION, SOLUTION INTRAVENOUS CONTINUOUS
Status: DISCONTINUED | OUTPATIENT
Start: 2021-05-18 | End: 2021-05-20

## 2021-05-18 RX ORDER — LIDOCAINE HYDROCHLORIDE AND EPINEPHRINE 20; 5 MG/ML; UG/ML
INJECTION, SOLUTION EPIDURAL; INFILTRATION; INTRACAUDAL; PERINEURAL AS NEEDED
Status: DISCONTINUED | OUTPATIENT
Start: 2021-05-18 | End: 2021-05-18 | Stop reason: HOSPADM

## 2021-05-18 RX ORDER — SODIUM CHLORIDE 0.9 % (FLUSH) 0.9 %
5-40 SYRINGE (ML) INJECTION EVERY 8 HOURS
Status: DISCONTINUED | OUTPATIENT
Start: 2021-05-18 | End: 2021-05-20

## 2021-05-18 RX ORDER — IBUPROFEN 800 MG/1
800 TABLET ORAL EVERY 8 HOURS
Status: DISCONTINUED | OUTPATIENT
Start: 2021-05-18 | End: 2021-05-21 | Stop reason: HOSPADM

## 2021-05-18 RX ORDER — KETOROLAC TROMETHAMINE 30 MG/ML
30 INJECTION, SOLUTION INTRAMUSCULAR; INTRAVENOUS
Status: DISPENSED | OUTPATIENT
Start: 2021-05-18 | End: 2021-05-19

## 2021-05-18 RX ORDER — DIPHENHYDRAMINE HCL 25 MG
25 CAPSULE ORAL
Status: DISCONTINUED | OUTPATIENT
Start: 2021-05-18 | End: 2021-05-21 | Stop reason: HOSPADM

## 2021-05-18 RX ORDER — EPHEDRINE SULFATE/0.9% NACL/PF 50 MG/5 ML
10 SYRINGE (ML) INTRAVENOUS
Status: DISCONTINUED | OUTPATIENT
Start: 2021-05-18 | End: 2021-05-18 | Stop reason: HOSPADM

## 2021-05-18 RX ORDER — CEFAZOLIN SODIUM 1 G/3ML
INJECTION, POWDER, FOR SOLUTION INTRAMUSCULAR; INTRAVENOUS AS NEEDED
Status: DISCONTINUED | OUTPATIENT
Start: 2021-05-18 | End: 2021-05-18 | Stop reason: HOSPADM

## 2021-05-18 RX ORDER — ONDANSETRON 4 MG/1
4 TABLET, ORALLY DISINTEGRATING ORAL
Status: DISCONTINUED | OUTPATIENT
Start: 2021-05-18 | End: 2021-05-21 | Stop reason: HOSPADM

## 2021-05-18 RX ORDER — HYDROCODONE BITARTRATE AND ACETAMINOPHEN 5; 325 MG/1; MG/1
2 TABLET ORAL
Status: DISCONTINUED | OUTPATIENT
Start: 2021-05-19 | End: 2021-05-21 | Stop reason: HOSPADM

## 2021-05-18 RX ORDER — FAMOTIDINE 10 MG/ML
INJECTION INTRAVENOUS AS NEEDED
Status: DISCONTINUED | OUTPATIENT
Start: 2021-05-18 | End: 2021-05-18 | Stop reason: HOSPADM

## 2021-05-18 RX ORDER — PHENYLEPHRINE HCL IN 0.9% NACL 0.4MG/10ML
SYRINGE (ML) INTRAVENOUS AS NEEDED
Status: DISCONTINUED | OUTPATIENT
Start: 2021-05-18 | End: 2021-05-18 | Stop reason: HOSPADM

## 2021-05-18 RX ORDER — HYDROCODONE BITARTRATE AND ACETAMINOPHEN 5; 325 MG/1; MG/1
1 TABLET ORAL
Status: DISCONTINUED | OUTPATIENT
Start: 2021-05-18 | End: 2021-05-18

## 2021-05-18 RX ORDER — NALOXONE HYDROCHLORIDE 0.4 MG/ML
0.4 INJECTION, SOLUTION INTRAMUSCULAR; INTRAVENOUS; SUBCUTANEOUS AS NEEDED
Status: DISCONTINUED | OUTPATIENT
Start: 2021-05-18 | End: 2021-05-21 | Stop reason: HOSPADM

## 2021-05-18 RX ORDER — HYDROCODONE BITARTRATE AND ACETAMINOPHEN 5; 325 MG/1; MG/1
1 TABLET ORAL
Status: DISCONTINUED | OUTPATIENT
Start: 2021-05-19 | End: 2021-05-21 | Stop reason: HOSPADM

## 2021-05-18 RX ORDER — SIMETHICONE 80 MG
80 TABLET,CHEWABLE ORAL AS NEEDED
Status: DISCONTINUED | OUTPATIENT
Start: 2021-05-18 | End: 2021-05-18 | Stop reason: SDUPTHER

## 2021-05-18 RX ORDER — OXYCODONE HYDROCHLORIDE 5 MG/1
5 TABLET ORAL
Status: ACTIVE | OUTPATIENT
Start: 2021-05-18 | End: 2021-05-19

## 2021-05-18 RX ORDER — ONDANSETRON 2 MG/ML
INJECTION INTRAMUSCULAR; INTRAVENOUS AS NEEDED
Status: DISCONTINUED | OUTPATIENT
Start: 2021-05-18 | End: 2021-05-18 | Stop reason: HOSPADM

## 2021-05-18 RX ORDER — OXYTOCIN/RINGER'S LACTATE 30/500 ML
87.3 PLASTIC BAG, INJECTION (ML) INTRAVENOUS AS NEEDED
Status: DISCONTINUED | OUTPATIENT
Start: 2021-05-18 | End: 2021-05-21 | Stop reason: HOSPADM

## 2021-05-18 RX ADMIN — LIDOCAINE HYDROCHLORIDE AND EPINEPHRINE 3 ML: 15; 5 INJECTION, SOLUTION EPIDURAL at 09:27

## 2021-05-18 RX ADMIN — ONDANSETRON HYDROCHLORIDE 4 MG: 2 SOLUTION INTRAMUSCULAR; INTRAVENOUS at 19:20

## 2021-05-18 RX ADMIN — AZITHROMYCIN MONOHYDRATE 500 MG: 500 INJECTION, POWDER, LYOPHILIZED, FOR SOLUTION INTRAVENOUS at 18:16

## 2021-05-18 RX ADMIN — SODIUM CHLORIDE, POTASSIUM CHLORIDE, SODIUM LACTATE AND CALCIUM CHLORIDE 125 ML/HR: 600; 310; 30; 20 INJECTION, SOLUTION INTRAVENOUS at 04:40

## 2021-05-18 RX ADMIN — Medication 10 ML/HR: at 17:18

## 2021-05-18 RX ADMIN — Medication 1 TABLET: at 08:42

## 2021-05-18 RX ADMIN — BUPIVACAINE HYDROCHLORIDE 5 ML: 2.5 INJECTION, SOLUTION EPIDURAL; INFILTRATION; INTRACAUDAL; PERINEURAL at 09:30

## 2021-05-18 RX ADMIN — SODIUM CHLORIDE, POTASSIUM CHLORIDE, SODIUM LACTATE AND CALCIUM CHLORIDE 1000 ML: 600; 310; 30; 20 INJECTION, SOLUTION INTRAVENOUS at 18:16

## 2021-05-18 RX ADMIN — Medication 10 ML/HR: at 09:50

## 2021-05-18 RX ADMIN — LABETALOL HYDROCHLORIDE 200 MG: 200 TABLET, FILM COATED ORAL at 08:42

## 2021-05-18 RX ADMIN — KETOROLAC TROMETHAMINE 30 MG: 30 INJECTION, SOLUTION INTRAMUSCULAR; INTRAVENOUS at 21:41

## 2021-05-18 RX ADMIN — SODIUM CHLORIDE, POTASSIUM CHLORIDE, SODIUM LACTATE AND CALCIUM CHLORIDE 125 ML/HR: 600; 310; 30; 20 INJECTION, SOLUTION INTRAVENOUS at 07:38

## 2021-05-18 RX ADMIN — BUTORPHANOL TARTRATE 1 MG: 2 INJECTION, SOLUTION INTRAMUSCULAR; INTRAVENOUS at 04:50

## 2021-05-18 RX ADMIN — ACETAMINOPHEN 650 MG: 325 TABLET ORAL at 18:16

## 2021-05-18 RX ADMIN — OXYTOCIN 2 MILLI-UNITS/MIN: 10 INJECTION, SOLUTION INTRAMUSCULAR; INTRAVENOUS at 04:45

## 2021-05-18 RX ADMIN — MORPHINE SULFATE 2.5 MG: 0.5 INJECTION, SOLUTION EPIDURAL; INTRATHECAL; INTRAVENOUS at 20:22

## 2021-05-18 RX ADMIN — BUTORPHANOL TARTRATE 1 MG: 2 INJECTION, SOLUTION INTRAMUSCULAR; INTRAVENOUS at 01:28

## 2021-05-18 RX ADMIN — CEFAZOLIN 2 G: 1 INJECTION, POWDER, FOR SOLUTION INTRAMUSCULAR; INTRAVENOUS at 18:40

## 2021-05-18 RX ADMIN — CEFAZOLIN 1 G: 1 INJECTION, POWDER, FOR SOLUTION INTRAMUSCULAR; INTRAVENOUS at 09:54

## 2021-05-18 RX ADMIN — CEFAZOLIN SODIUM 2 G: 1 POWDER, FOR SOLUTION INTRAMUSCULAR; INTRAVENOUS at 19:18

## 2021-05-18 RX ADMIN — FAMOTIDINE 20 MG: 10 INJECTION INTRAVENOUS at 19:24

## 2021-05-18 RX ADMIN — SODIUM CHLORIDE, POTASSIUM CHLORIDE, SODIUM LACTATE AND CALCIUM CHLORIDE 125 ML/HR: 600; 310; 30; 20 INJECTION, SOLUTION INTRAVENOUS at 08:43

## 2021-05-18 RX ADMIN — LIDOCAINE HYDROCHLORIDE,EPINEPHRINE BITARTRATE 15 ML: 20; .005 INJECTION, SOLUTION EPIDURAL; INFILTRATION; INTRACAUDAL; PERINEURAL at 19:07

## 2021-05-18 RX ADMIN — CEFAZOLIN 2 G: 1 INJECTION, POWDER, FOR SOLUTION INTRAMUSCULAR; INTRAVENOUS at 01:30

## 2021-05-18 RX ADMIN — BUPROPION HYDROCHLORIDE 150 MG: 150 TABLET, EXTENDED RELEASE ORAL at 07:48

## 2021-05-18 RX ADMIN — BUPIVACAINE HYDROCHLORIDE 5 ML: 2.5 INJECTION, SOLUTION EPIDURAL; INFILTRATION; INTRACAUDAL; PERINEURAL at 09:35

## 2021-05-18 RX ADMIN — Medication 40 MCG: at 19:49

## 2021-05-18 RX ADMIN — MENTHOL, METHYL SALICYLATE: 10; 15 CREAM TOPICAL at 08:44

## 2021-05-18 RX ADMIN — OXYTOCIN 40 UNITS: 10 INJECTION, SOLUTION INTRAMUSCULAR; INTRAVENOUS at 19:34

## 2021-05-18 RX ADMIN — SODIUM CHLORIDE, POTASSIUM CHLORIDE, SODIUM LACTATE AND CALCIUM CHLORIDE 125 ML/HR: 600; 310; 30; 20 INJECTION, SOLUTION INTRAVENOUS at 22:29

## 2021-05-18 NOTE — PROGRESS NOTES
Pt with little progress in descent. Discuss options. Pt would like to labor down and re attempt pushing. vs CS.     Arielle Gonzalez MD

## 2021-05-18 NOTE — PROGRESS NOTES
Pt with scant descent with multiple position changes and low grade temperature. Visit Vitals  /81   Pulse 96   Temp 100.4 °F (38 °C)   Resp 16   Ht 5' 5\" (1.651 m)   Wt 210 lb (95.3 kg)   SpO2 (!) 83%   BMI 34.95 kg/m²     Will proceed with primary cs for failure of descent, low grade fevers. 6:13 PM    Awaiting availability of anesthesia staff. OR.     Carly Bryan MD TOKSTEPHANIE:'[id number]'

## 2021-05-18 NOTE — BRIEF OP NOTE
Brief Postoperative Note    Patient: Maria Isabel Frazier  YOB: 1990  MRN: [id number]    Date of Procedure: primary LTCS    Pre-Op Diagnosis: failure of descent, second stage arrest    Post-Op Diagnosis: same    Surgeon(s):  Hermelindo Boyd MD    Surgical Assistant: * Surgery not found *  LTCS Abimael Burch    Anesthesia: CSE    Estimated Blood Loss (mL): 965 cc    Complications: None    Specimens: * Cannot find log *   placenta    Implants: * No implants in log *    Drains: * No LDAs found *    Findings: LBFI cephalic, OP, NC x1.     Electronically Signed by Yaritza Taylor MD on 5/18/2021 at 6:14 PM

## 2021-05-18 NOTE — PROGRESS NOTES
The patient's fetal hear tracing was reviewed after notification from the patient's nurse regarding fetal heart rate decelerations. FHR: 125 moderate variability, accelerations, sporadic decelerations that are late in appearance, currently reactive  [place]: sporadic contractions  In light of sporadic decelerations 0100 dose of cytotec will not be administered. I will continue to monitor the fetal heart tracing.

## 2021-05-18 NOTE — ANESTHESIA PROCEDURE NOTES
Epidural Block    Patient location during procedure: OB  Start time: 5/18/2021 9:15 AM  End time: 5/18/2021 9:37 AM  Reason for block: labor epidural  Staffing  Performed: CRNA   Resident/CRNA: Sheri Mccormick CRNA  Preanesthetic Checklist  Completed: patient identified, IV checked, site marked, risks and benefits discussed, surgical consent, monitors and equipment checked, pre-op evaluation and timeout performed  Block Placement  Patient position: sitting  Prep: Betadine  Sterility prep: cap, drape, gloves, gown, hand and mask  Sedation level: no sedation  Patient monitoring: heart rate, frequent blood pressure checks and continuous pulse oximetry  Approach: midline  Location: lumbar  Lumbar location: L3-L4  Epidural  Loss of resistance technique: air  Guidance: landmark technique  Needle  Needle type: Tuohy   Needle gauge: 18 G  Needle length: 9 cm  Needle insertion depth: 6.5 cm  Catheter type: multi-orifice  Catheter size: 20 G  Catheter at skin depth: 11.5 cm  Catheter securement method: clear occlusive dressing, liquid medical adhesive, stabilization device and surgical tape  Test dose: negative  Assessment  Block outcome: pain improved  Number of attempts: 1  Procedure assessment: patient tolerated procedure well with no complications  Additional Notes  Easily placed on second pass (first pass deep OS and mild transient paresthesia on R side); neg heme, neg paresthesia, neg CSF w +FRANCO. Catheter easily threaded to 11.5 cm. Pt tolerated v well w RN assistance w positioning.

## 2021-05-18 NOTE — PROGRESS NOTES
Pt pushing, good effort. Attempt at closed knee pushing. Will attempt modified myrna lowery to see if can help with descent and reassess. FHT 130s, mod kalee +accel kalee decels to 110-120. < 20 seconds.     Jun Dhillon MD

## 2021-05-18 NOTE — PROGRESS NOTES
Problem: Falls - Risk of  Goal: *Absence of Falls  Description: Document Melani Rosa Fall Risk and appropriate interventions in the flowsheet.   Outcome: Progressing Towards Goal  Note: Fall Risk Interventions:            Medication Interventions: Teach patient to arise slowly                   Problem: Patient Education: Go to Patient Education Activity  Goal: Patient/Family Education  Outcome: Progressing Towards Goal     Problem: Hypertensive Disorders of Pregnancy Care Plan (Gestational HTN, Preeclampsia, HELLP syndrome, Eclampsia, Chronic HTN, Superimposed Preeclamsia)  Goal: *Blood pressure within specified parameters  Outcome: Progressing Towards Goal  Goal: *Fluid volume balance  Outcome: Progressing Towards Goal  Goal: *Labs within defined limits  Outcome: Progressing Towards Goal  Goal: *Reassuring fetal surveillance  Outcome: Progressing Towards Goal  Goal: *Remains free of seizures  Outcome: Progressing Towards Goal     Problem: Patient Education: Go to Patient Education Activity  Goal: Patient/Family Education  Outcome: Progressing Towards Goal     Problem: Vaginal Delivery: Day of Deliver-Laboring  Goal: Activity/Safety  Outcome: Progressing Towards Goal  Goal: Consults, if ordered  Outcome: Progressing Towards Goal  Goal: Diagnostic Test/Procedures  Outcome: Progressing Towards Goal  Goal: Nutrition/Diet  Outcome: Progressing Towards Goal  Goal: Discharge Planning  Outcome: Progressing Towards Goal  Goal: Medications  Outcome: Progressing Towards Goal  Goal: Respiratory  Outcome: Progressing Towards Goal  Goal: Treatments/Interventions/Procedures  Outcome: Progressing Towards Goal  Goal: *Vital signs within defined limits  Outcome: Progressing Towards Goal  Goal: *Labs within defined limits  Outcome: Progressing Towards Goal  Goal: *Hemodynamically stable  Outcome: Progressing Towards Goal  Goal: *Optimal pain control at patient's stated goal  Outcome: Progressing Towards Goal

## 2021-05-18 NOTE — PROGRESS NOTES
0720-Bedside shift change report given to DIANNE Guerrero RN (oncoming nurse) by TAMMY Gutierrez RN (offgoing nurse). Report included the following information SBAR, Intake/Output, MAR, Recent Results and Med Rec Status. 0735-Dr Smith at bedside for VE and AROM, patel bulb removed, VE-3/50/-3    0736-AROM, clear fluid, pt requests IV bolus be started to prep for epidural    0851-Dr. Smith at bedside to see pt, aware pt about to get epidural    0910-GINA Egna See at bedside    0825-EL    0927-Epidural catheter placed, Test dose    0930-Loading dose    1005-Pt repositioned to far left side, peanut ball between knees    1057-IUPC inserted    1103-Pt repositioned to far right side, peanut ball between knees    1158-Pt repositioned to far left side, peanut ball between knees    1304-Pt repositioned to high fowlers per pt request    1333-RN reported off to GINA Kenyon    1425-RN assumed care of pt at this time    1438-Pt complete    1440-Patel catheter removed, 400 ml    1441-Dr. Smith at bedside for Shyam Bird MD made aware of pt BP readings, MD wants to wait to see how BP does as pt has been in pain from labor, no further orders received. 1627-Pt positioned to [facility] at this time    1618-Dr. Smith at bedside to assess pt pushing    1654-Pt requests Dr. Alejandra Rodriguez at bedside to assess pushing to see if MD is able to assist with delivery. RN notified Dr. Alejandra Rodriguez of pt request.    1701-Dr. Smith at bedside    1707-Dr. Roberts Blocamila suggests pt labor down for a little while to give pt a break from pushing. Pt agrees. MD aware of pt BP readings and pt slight HA has returned. No orders received. 1712-Straight cath attempted, a couple drops bloody urine returned    1755-Perfect serve sent to Dr. Alejandra Rodriguez notifying of pt pressure, RN check cervix, fetal station, no change, BP readings given,  baseline, accels and one variable in last 40 mins. Pt temp 100.4 currently. Waiting for response.     1805-Dr. Smith at bedside, assessed pt pushing, orders C/S at this time, pt verbalizes understanding. MD aware of pt BP readings. Orders pt to receive 1800 dose of labetalol PO. MD placing orders for azithromycin 500 mg IVPB, tylenol 650 mg PO and ancef 2 grams IV push. RN will review orders. 1855-Dr. Webster at bedside    1900-C. Daryle Lesches at bedside dosing pt epidural    1910-Bedside shift change report given to SHAHANA Wilks RN (oncoming nurse) by Mi Romeo RN (offgoing nurse). Report included the following information SBAR, Intake/Output, MAR, Recent Results and Med Rec Status.      LATE ENTRY-Pt started pushing at 1504 on 5/18/21 per MD verbal order

## 2021-05-18 NOTE — PROGRESS NOTES
Pt fairly comfortable. Cook catheter deflated and removed. Visit Vitals  BP (!) 152/94 (BP 1 Location: Left arm, BP Patient Position: At rest)   Pulse 73   Temp 98.9 °F (37.2 °C)   Resp 16   Ht 5' 5\" (1.651 m)   Wt 210 lb (95.3 kg)   SpO2 97%   BMI 34.95 kg/m²   .   Patient Vitals for the past 12 hrs:   Temp Pulse Resp BP SpO2   05/18/21 0726 -- 77 -- (!) 139/90 --   05/18/21 0553 -- -- -- -- 97 %   05/18/21 0548 -- -- -- -- 97 %   05/18/21 0543 -- -- -- -- 97 %   05/18/21 0538 -- -- -- -- 97 %   05/18/21 0533 -- -- -- -- 97 %   05/18/21 0528 -- -- -- -- 98 %   05/18/21 0523 -- -- -- -- 97 %   05/18/21 0518 -- -- -- -- 97 %   05/18/21 0513 -- -- -- -- 97 %   05/18/21 0508 -- -- -- -- 97 %   05/18/21 0503 -- -- -- -- 98 %   05/18/21 0458 -- -- -- -- 95 %   05/18/21 0453 -- -- -- -- 98 %   05/18/21 0448 -- -- -- -- 99 %   05/18/21 0443 -- -- -- -- 99 %   05/18/21 0438 -- -- -- -- 99 %   05/18/21 0433 98.9 °F (37.2 °C) 73 16 (!) 152/94 99 %   05/18/21 0343 -- -- -- -- 98 %   05/18/21 0338 -- -- -- -- 100 %   05/18/21 0333 -- -- -- -- 98 %   05/18/21 0328 -- -- -- -- 98 %   05/18/21 0323 -- -- -- -- 100 %   05/18/21 0318 -- -- -- -- 98 %   05/18/21 0313 -- -- -- -- 98 %   05/18/21 0259 -- -- -- -- 100 %   05/18/21 0254 -- -- -- -- 98 %   05/18/21 0249 -- -- -- -- 98 %   05/18/21 0244 -- -- -- -- 97 %   05/18/21 0239 -- -- -- -- 97 %   05/18/21 0234 -- -- -- -- 99 %   05/18/21 0229 -- -- -- -- 98 %   05/18/21 0224 -- -- -- -- 98 %   05/18/21 0219 -- -- -- -- 98 %   05/18/21 0214 -- -- -- -- 98 %   05/18/21 0209 -- -- -- -- 98 %   05/18/21 0204 -- -- -- -- 98 %   05/18/21 0159 -- -- -- -- 99 %   05/18/21 0154 -- -- -- -- 100 %   05/18/21 0149 -- -- -- -- 98 %   05/18/21 0144 -- -- -- -- 97 %   05/18/21 0139 -- -- -- -- 98 %   05/18/21 0129 -- -- -- -- 98 %   05/18/21 0124 -- -- -- -- 98 %   05/18/21 0121 -- 81 -- 136/84 --   05/18/21 0119 -- -- -- -- 98 %   05/18/21 0109 -- -- -- -- 97 %   05/18/21 0104 -- -- -- -- 98 %   21 -- -- -- -- 98 %   21 -- -- -- -- 98 %   21 -- -- -- -- 97 %   21 -- -- -- -- 97 %   21 -- -- -- -- 97 %   21 -- -- -- -- 98 %   21 -- -- -- -- 98 %   21 -- -- -- -- 98 %   214 -- -- -- -- 99 %   21 -- -- -- -- 98 %   21 -- -- -- -- 98 %   21 -- -- -- -- 98 %   21 -- -- -- -- 97 %   21 -- -- -- -- 97 %   21 -- -- -- -- 98 %   21 -- -- -- -- 97 %   21 -- -- -- -- 97 %   21 -- -- -- -- 97 %   21 -- -- -- -- 98 %   21 -- -- -- -- 96 %   21 -- -- -- -- 97 %   21 -- -- -- -- 96 %   21 -- -- -- -- 97 %   21 -- -- -- -- 97 %   21 -- -- -- -- 96 %   21 -- -- -- -- 96 %   21 -- -- -- -- 97 %   21 -- -- -- -- 96 %   21 -- -- -- -- 96 %   21 -- -- -- -- 97 %   21 -- -- -- -- 97 %   21 -- -- -- -- 97 %   21 -- -- -- -- 97 %   21 -- -- -- -- 100 %   21 -- -- -- -- 99 %   21 -- -- -- -- 97 %   21 -- -- -- -- 99 %   21 -- -- -- -- 98 %   21 -- -- -- -- 99 %   21 -- -- -- -- 98 %   21 -- -- -- -- 100 %   21 -- -- -- -- 98 %   21 -- -- -- -- 99 %   21 -- -- -- -- 98 %   21 -- -- -- -- 99 %   21 98.2 °F (36.8 °C) 76 16 (!) 140/82 99 %   21 -- -- -- -- 99 %   21 -- -- -- -- 99 %       Cervical Exam  Dilation (cm): 3  Eff: 50 %  Station: -3  Position: Posterior  Cervical Consistency: Soft  Vaginal exam done by? : Dr. Bolaños Clock Status: Intact    AROM clear    27 y.o.  37w0d  [address]  Anticipate   Disc rba of magnesium and seizure prophylaxis, will hold for now if BP not severe/controlled. Labetalol    Guevara Hayden MD      NST Inpatient Procedure Note    Joan Ibrahim presents for fetal non-stress test.    Indication is [address], IOL. She is 37w0d. She has been monitored for more than 60 minutes. The FHR was reactive. NST Interpretation:   FHR baseline 130 bpm,   Variability:  moderate  Accelerations:  present  Decelerations Absent. Uterine contractions:  present    Assessment  NST is reactive. NST is reassuring. Patient does need admission/observation for further monitoring. Олег Landrum was informed of the NST results and her questions were answered. Plan:    [x] Continue admission on Labor and Delivery    [] Continue observation on Labor and Delivery   [] Keep routine OB follow up upon discharge   [] Reviewed fetal movement kick counts, notify MD if decreased   [] Continue continuous fetal monitoring.    []

## 2021-05-18 NOTE — PROGRESS NOTES
Late entry  Pt cc 240pm with variable decels  0 station. Pitocin off  Pt repositioned to labor down and variable decels improved. Pt labored down with recurrent variable decels 1500. Dw pt: option of cs vs attempt at vd  Pt wants to try pushing. Pt pushing with fair effort:  CEFM   130s mod enriqueta  Enriqueta decls to 110/120 for < 20 seconds with good return to baseline. Will continue pushing. Anticipate  if makes progress    Elevated BP in severe range while pushing, pt without sx, will monitor closely and try to check between UC.       Carly Bryan MD

## 2021-05-18 NOTE — OP NOTES
[facility]  [email or web address]  [phone]    Paul Ruth MD, FACOG       Pre-operative Diagnosis: arrest of descent, 2nd stage arrest     Post-operative Diagnosis: same, NC x1    Procedure: Low transverse  section, primary    Surgeon: Paul Ruth MD    Assistant: labor and delivery staff, * Surgery not found *    EBL: 750 cc    Urine output: per anesthesia records    Anesthesia: * No surgery found *    Prophylactic Antibiotics: azithromycin or Ancef    DVT Prophylaxis: Sequential Compression Devices    Complications: none    Implants: none    Path: placenta    Procedure Detail:      After proper patient identification and consent, the patient was taken to the operating room, where CSE anesthesia was administered and found to be adequate. A patel catheter had been previously placed using sterile technique. The patient was prepped and draped in the normal sterile fashion for abdominopelvic surgery. A Pfannenstiel skin incision was made with a scalpel and carried down to the rectus fascia with blunt and sharp dissection. The rectus fascia was opened in the midline with the scalpel and extended laterally with the Richards scissors. The superior aspect of the fascial incision was then grasped with two Kocher clamps, elevated and the underlying rectus muscles were dissected off with blunt and sharp dissection. In a similar fashion, the inferior aspect of the fascial incision was grasped with two Kocher clamps and the underlying rectus muscle were dissected off with blunt and sharp dissection. The rectus muscles were  bluntly in the midline. The peritoneum was elevated and entered bluntly well superior to the bladder without any apparent injury. A bladder blade was inserted and the vesicouterine peritoneum was identified. The bladder flap was created without difficulty with blunt and sharp dissection and the bladder blade was replaced.   A low transverse uterine incision was made with the scalpel and extended with digital traction. Hysterotomy and aminotomy was performed and the fluid was small amount clear. The 's hand was placed into the hysterotomy and the 's OP vertex was grasped, flexed, and brought atraumatically through the hysterotomy incision. The nose and mouth were bulb suctioned. A nuchal cord was easily reduced. The rest of the baby was delivered without difficulty. The cord was doubly clamped and cut and the  was handed off to Nursing staff in attendance, but the  team was also called to the delivery room during the  section. The placenta was then removed from the uterus and sent to pathology. Cord pH was obtained by the nursing staff. The uterus was curettaged with a moist lap pad and cleared of all clots and debris. The uterus was exteriorized. The uterine incision was closed with 0 Vicryl suture, first in a running locking fashion, followed by a second embricating layer, with good hemostasis was obtained. The lateral gutters were irrigated with warm normal saline and clot and debris was removed and normal tubes and ovaries were noted bilaterally. The incision was reevaluated and good hemostasis was again reassured. The uterus was returned to the abdomen. The rectus muscles were then reapproximated with a 2-0 Vicryl in a horizontal mattress suture. The fascia was closed with #1 Vicryl in a running fashion. Good hemostasis of the fascial incision was assured. The subcutaneous tissue was irrigated and hemostasis of the overlying subcutaneous tissue was assured with pressure and the bovie. The skin was closed with a 3-0 monocryl subcuticular closure and reinforced with steristrips. The patient tolerated the procedure well. Sponge, lap, and needle counts were correct times three and the patient and baby were taken to recovery/postpartum room in stable condition.       Jessica Dunbar MD    May 18, 2021  6:14 PM

## 2021-05-18 NOTE — PROGRESS NOTES
Pt co some pressure with UC  CX; 60/3/-3  iupc placed to monitor MVU after discussion with pt.     Visit Vitals  BP (!) 139/90   Pulse 77   Temp 98.4 °F (36.9 °C)   Resp 16   Ht 5' 5\" (1.651 m)   Wt 210 lb (95.3 kg)   SpO2 97%   BMI 34.95 kg/m²     31 y.o.  37w0d  IOL for SIPIH  BP stable on labetalol  GBS pos on ancef    Titrate pitocin while RFS  Anticipate

## 2021-05-18 NOTE — ANESTHESIA PREPROCEDURE EVALUATION
Relevant Problems   NEUROLOGY   (+) ADD (attention deficit disorder)   (+) Depression      CARDIOVASCULAR   (+) Hypertension in pregnancy   (+) Pre-existing essential hypertension during pregnancy, antepartum       Anesthetic History   No history of anesthetic complications       Comments: 28 yo  at [address] for IOL. Pregnancy complicated by [facility]. Pt has been in patient for 21 days. PMH significant for ADD, HTN, Ulcerative Colitis and Trauma.       Review of Systems / Medical History  Patient summary reviewed, nursing notes reviewed and pertinent labs reviewed    Pulmonary  Within defined limits                 Neuro/Psych         Headaches and psychiatric history    Comments: Migraines  ADD  PTSD (rape , ) Cardiovascular    Hypertension: well controlled              Exercise tolerance: >4 METS  Comments: CHTN   GI/Hepatic/Renal     GERD: well controlled      PUD    Comments: Ulcerative Colitis Endo/Other             Other Findings   Comments: Labs [email or web address]:   Hg 11.3; Hct 34.5; Plt 140           Physical Exam    Airway  Mallampati: I  TM Distance: 4 - 6 cm  Neck ROM: normal range of motion   Mouth opening: Normal     Cardiovascular  Regular rate and rhythm,  S1 and S2 normal,  no murmur, click, rub, or gallop             Dental  No notable dental hx       Pulmonary  Breath sounds clear to auscultation               Abdominal  GI exam deferred       Other Findings            Anesthetic Plan    ASA: 2  Anesthesia type: epidural      Post-op pain plan if not by surgeon: indwelling epidural catheter    Induction: Intravenous  Anesthetic plan and risks discussed with: Patient      Consent obtained, questions answered and exam completed prior to start of epidural.

## 2021-05-18 NOTE — ADT AUTH CERT NOTES
Hypertensive Disorders of Pregnancy - Clinical Indications for Admission to Inpatient Care by Jay Perdomo       Review Status Review Entered   Completed 2021 16:34      Criteria Review      Clinical Indications for Admission to Inpatient Care    Most Recent : Jay Perdomo Most Recent Date: 2021 16:34:34 EDT    (X) Admission is indicated for  1 or more  of the following  (1) (2) (3) (4):       (X) Preeclampsia [B] with severe features (ie, severe preeclampsia) as indicated by  1 or more        of the following :          (X) Severe hypertension (SBP greater than or equal to 160 mm Hg or DBP greater than or equal to          110 mm Hg)          2021 16:34:34 EDT by Jay Perdomo            21:  /91, 169/93 on labetalol 200mg PO bid     (X) Other Indication: A/P      Entered 2021 10:21:07 EDT by Beata Good     Assessment:   27 y.o.  34w1d   SIPIH  Sp bmz x2 ,   Labs stable 2021      Plan:  Continue hospitalization with hospitalized bedrest   Additional Notes   21 - IP      Progress note:   36w6d   Patient states she has no new complaints. Her neck/shoulder pain is still there, she saw PT today. She saw MFM this am and advises proceeding with IOL today. Good FM, no UC. Exam:     General: Patient without distress. Abdomen: soft, non-tender   Fundus: soft, gravid, non-tender   Extremities: no redness or tenderness or cords                         NST Interpretation:    FHR baseline 130 bpm,    Variability:  moderate   Accelerations:  present   Decelerations Absent. Uterine contractions:  irregular       Assessment   NST is reactive. NST is reassuring. Assessment:    27 y. o.  36w6d    SIPIH on labetalol BID with more severe range BP today   GBS pos   RFS      Plan:  Continue hospitalization with hospitalized bedrest   Will place patel for cervical ripening, treat GBS in labor.    Monitor BP   Appreciate PT input   Plan misoprostol at 12 am, start ancef and pitocin in am.   Epidural prn in am or with labor          Cervical Patel insertion Procedure Note   Joleen Etienne is a , 27 y.o. 36w6d who presents today far placement of a patel catheter in the cervix for ripening. Her cervix is unfavorable and she is scheduled for induction tomorrow. She has elected to have a cervical patel catheter placement today. POST PROCEDURE: The patient tolerated the procedure well.  There were no complications.     She will remain on L and D      Hypertensive Disorders of Pregnancy - Care Day 20 (2021) by Salinas Ken       Review Status Review Entered   Completed 2021 16:33      Criteria Review      Care Day: 20 Care Date: 2021 Level of Care: Inpatient Floor    Guideline Day 1    Level Of Care    (X) Obstetric floor [E]    2021 16:29:21 EDT by Salinas Ken      L&D  T 98.4    90    166/91,  169/93   18    98%    Clinical Status    (X) * Clinical Indications met [F]    2021 16:29:21 EDT by Salinas Ken      27 y.o.  36w6d  SIPIH on labetalol 200mg BID  Sp BMZ x2  Plan IOL 37 wks    (X) Possible headache, visual disturbance, proteinuria    2021 16:33:23 EDT by Salinas Ken      Seizure precautions    Activity    (X) Bed rest    2021 16:33:23 EDT by Linsey Lara w/ bathroom privileges    Routes    (X) Oral hydration, oral or parenteral medications    2021 16:33:23 EDT by Salinas Ken      PO intake    (X) Diet as tolerated    2021 16:33:23 EDT by Salinas Ken      Regular diet    Interventions    (X) Urinalysis, CBC, platelet count, creatinine, transaminases    2021 16:33:23 EDT by Lorna Saucedo      WBC 10.0, HGB 11.3, HCT 34.5, Plat 140, Ch 109, BUN 10/Cr 0.51, Protein 5.5, Albumin 2.5, Alk Phos 176    (X) Establish gestational age    2021 16:33:23 EDT by Salinas Ken      36w5d    (X) Frequent vital signs and monitoring, including blood pressure, reflexes, urine protein    (X) Fetal testing    5/17/2021 16:33:23 EDT by Grazyna Concepcion      FETAL NONSTRESS TEST bid    Medications    (X) Oral or parenteral antihypertensives    5/17/2021 16:33:23 EDT by Grazyna Concepcion      aspirin 81mg PO daily, Wellbutrin 150mg PO daily, labetalol 200mg PO bid, Prenatal vitamin 1 tab daily    * Milestone

## 2021-05-19 LAB
ALBUMIN SERPL-MCNC: 1.9 G/DL (ref 3.5–5)
ALBUMIN/GLOB SERPL: 0.6 {RATIO} (ref 1.1–2.2)
ALP SERPL-CCNC: 125 U/L (ref 45–117)
ALT SERPL-CCNC: 13 U/L (ref 12–78)
ANION GAP SERPL CALC-SCNC: 3 MMOL/L (ref 5–15)
AST SERPL-CCNC: 21 U/L (ref 15–37)
BASOPHILS # BLD: 0.1 K/UL (ref 0–0.1)
BASOPHILS NFR BLD: 0 % (ref 0–1)
BILIRUB SERPL-MCNC: 0.4 MG/DL (ref 0.2–1)
BUN SERPL-MCNC: 10 MG/DL (ref 6–20)
BUN/CREAT SERPL: 18 (ref 12–20)
CALCIUM SERPL-MCNC: 7.9 MG/DL (ref 8.5–10.1)
CHLORIDE SERPL-SCNC: 107 MMOL/L (ref 97–108)
CO2 SERPL-SCNC: 27 MMOL/L (ref 21–32)
CREAT SERPL-MCNC: 0.56 MG/DL (ref 0.55–1.02)
DIFFERENTIAL METHOD BLD: ABNORMAL
EOSINOPHIL # BLD: 0.1 K/UL (ref 0–0.4)
EOSINOPHIL NFR BLD: 1 % (ref 0–7)
ERYTHROCYTE [DISTWIDTH] IN BLOOD BY AUTOMATED COUNT: 13.8 % (ref 11.5–14.5)
GLOBULIN SER CALC-MCNC: 3.1 G/DL (ref 2–4)
GLUCOSE SERPL-MCNC: 73 MG/DL (ref 65–100)
HCT VFR BLD AUTO: 31.1 % (ref 35–47)
HGB BLD-MCNC: 10.1 G/DL (ref 11.5–16)
IMM GRANULOCYTES # BLD AUTO: 0.1 K/UL (ref 0–0.04)
IMM GRANULOCYTES NFR BLD AUTO: 1 % (ref 0–0.5)
LYMPHOCYTES # BLD: 1.4 K/UL (ref 0.8–3.5)
LYMPHOCYTES NFR BLD: 10 % (ref 12–49)
MCH RBC QN AUTO: 29.4 PG (ref 26–34)
MCHC RBC AUTO-ENTMCNC: 32.5 G/DL (ref 30–36.5)
MCV RBC AUTO: 90.7 FL (ref 80–99)
MONOCYTES # BLD: 0.8 K/UL (ref 0–1)
MONOCYTES NFR BLD: 6 % (ref 5–13)
NEUTS SEG # BLD: 11.6 K/UL (ref 1.8–8)
NEUTS SEG NFR BLD: 82 % (ref 32–75)
NRBC # BLD: 0 K/UL (ref 0–0.01)
NRBC BLD-RTO: 0 PER 100 WBC
PLATELET # BLD AUTO: 138 K/UL (ref 150–400)
PMV BLD AUTO: 13 FL (ref 8.9–12.9)
POTASSIUM SERPL-SCNC: 4.2 MMOL/L (ref 3.5–5.1)
PROT SERPL-MCNC: 5 G/DL (ref 6.4–8.2)
RBC # BLD AUTO: 3.43 M/UL (ref 3.8–5.2)
SODIUM SERPL-SCNC: 137 MMOL/L (ref 136–145)
WBC # BLD AUTO: 14.1 K/UL (ref 3.6–11)

## 2021-05-19 PROCEDURE — 2709999900 HC NON-CHARGEABLE SUPPLY

## 2021-05-19 PROCEDURE — 80053 COMPREHEN METABOLIC PANEL: CPT

## 2021-05-19 PROCEDURE — 74011250637 HC RX REV CODE- 250/637: Performed by: FAMILY MEDICINE

## 2021-05-19 PROCEDURE — 65270000029 HC RM PRIVATE

## 2021-05-19 PROCEDURE — 74011250637 HC RX REV CODE- 250/637: Performed by: OBSTETRICS & GYNECOLOGY

## 2021-05-19 PROCEDURE — 74011250636 HC RX REV CODE- 250/636: Performed by: STUDENT IN AN ORGANIZED HEALTH CARE EDUCATION/TRAINING PROGRAM

## 2021-05-19 PROCEDURE — 74011250636 HC RX REV CODE- 250/636: Performed by: OBSTETRICS & GYNECOLOGY

## 2021-05-19 PROCEDURE — 85025 COMPLETE CBC W/AUTO DIFF WBC: CPT

## 2021-05-19 PROCEDURE — 36415 COLL VENOUS BLD VENIPUNCTURE: CPT

## 2021-05-19 RX ADMIN — ACETAMINOPHEN 650 MG: 325 TABLET ORAL at 08:14

## 2021-05-19 RX ADMIN — SODIUM CHLORIDE, POTASSIUM CHLORIDE, SODIUM LACTATE AND CALCIUM CHLORIDE 500 ML: 600; 310; 30; 20 INJECTION, SOLUTION INTRAVENOUS at 08:13

## 2021-05-19 RX ADMIN — KETOROLAC TROMETHAMINE 30 MG: 30 INJECTION, SOLUTION INTRAMUSCULAR; INTRAVENOUS at 04:13

## 2021-05-19 RX ADMIN — Medication 1 TABLET: at 08:14

## 2021-05-19 RX ADMIN — IBUPROFEN 800 MG: 800 TABLET, FILM COATED ORAL at 23:26

## 2021-05-19 RX ADMIN — SIMETHICONE 80 MG: 80 TABLET, CHEWABLE ORAL at 17:37

## 2021-05-19 RX ADMIN — KETOROLAC TROMETHAMINE 30 MG: 30 INJECTION, SOLUTION INTRAMUSCULAR; INTRAVENOUS at 17:37

## 2021-05-19 RX ADMIN — DIPHENHYDRAMINE HYDROCHLORIDE 25 MG: 25 CAPSULE ORAL at 17:37

## 2021-05-19 RX ADMIN — BUPROPION HYDROCHLORIDE 150 MG: 150 TABLET, EXTENDED RELEASE ORAL at 08:14

## 2021-05-19 RX ADMIN — LABETALOL HYDROCHLORIDE 200 MG: 200 TABLET, FILM COATED ORAL at 08:14

## 2021-05-19 RX ADMIN — Medication 10 ML: at 17:42

## 2021-05-19 RX ADMIN — KETOROLAC TROMETHAMINE 30 MG: 30 INJECTION, SOLUTION INTRAMUSCULAR; INTRAVENOUS at 11:30

## 2021-05-19 RX ADMIN — SIMETHICONE 80 MG: 80 TABLET, CHEWABLE ORAL at 11:30

## 2021-05-19 RX ADMIN — LABETALOL HYDROCHLORIDE 200 MG: 200 TABLET, FILM COATED ORAL at 17:38

## 2021-05-19 NOTE — LACTATION NOTE
This note was copied from a baby's chart. Discussed with mother her plan for feeding. Reviewed the benefits of exclusive breast milk feeding during the hospital stay. Informed her of the risks of using formula to supplement in the first few days of life as well as the benefits of successful breast milk feeding; referred her to the Breastfeeding booklet about this information. She acknowledges understanding of information reviewed and states that it is her plan to blend feed her infant. Will support her choice and offer additional information as needed. Pt chooses to do both breast and bottle. Discussed effects of early supplementation on breastfeeding success; may decrease breastmilk production and supply, increase risk for pathological engorgement, baby may develop preference for faster flow from bottles vs breast, and baby's stomach can be stretched if larger volumes of formula are given. Hand Expression Education:  Mom taught how to manually hand express her colostrum. Emphasized the importance of providing infant with valuable colostrum as infant rests skin to skin at breast.  Aware to avoid extended periods of non-feeding. Aware to offer 10-20+ drops of colostrum every 2-3 hours until infant is latching and nursing effectively. Taught the rationale behind this low tech but highly effective evidence based practice. Pt will successfully establish breastfeeding by feeding in response to early feeding cues or wake every 3h, will obtain deep latch, and will keep log of feedings/output. Taught to BF at hunger cues and or q 2-3 hrs and to offer 10-20 drops of hand expressed colostrum at any non-feeds.       Breast Assessment  Left Breast: Large  Left Nipple: Everted, Intact  Right Breast: Large  Right Nipple: Everted, Intact  Breast- Feeding Assessment  Attends Breast-Feeding Classes: Yes  Breast-Feeding Experience: No  Breast Trauma/Surgery: No  Type/Quality: Attempted  Lactation Consultant Visits  Breast-Feedings: Attempted breast-feeding  Mother/Infant Observation  Mother Observation: Alignment, Breast comfortable, Close hold, Holds breast, Recognizes feeding cues  Infant Observation: Feeding cues, Frenulum checked, Opens mouth  LATCH Documentation  Latch: Too sleepy or reluctant, no latch achieved  Audible Swallowing: None  Type of Nipple: Everted (after stimulation)  Comfort (Breast/Nipple): Soft/non-tender  Hold (Positioning): Full assist, staff holds infant at breast  LATCH Score: 4     Mom states that baby has not latched well since delivery. Mom has been supplementing with formula. Taught based bottle feeding as a method of facilitating between breast and bottle. Reviewed with mom how to effectively hand express. Encouraged skin to skin and offering breast at early cues of hunger. [facility] [employer] pump set up with instruction per patient request.  Discussed risk of hyperstimulation with pump and hand expression use. Mom encouraged to practice hand expression and offer EBM to baby via finger or syringe.

## 2021-05-19 NOTE — PROGRESS NOTES
0600 Dr Elysia Sonroom notified of low urine output of 40 within two hours and of bloody urine. 500 bolus ordered.

## 2021-05-19 NOTE — PROGRESS NOTES
200 Dr Brady Felton and Dr Janell Stanford notified of blood tinged urine remains in patel. Pt is producing urine. Break through bleeding noted on  Dressing. Will change to pressure dressing if bleeding continues.

## 2021-05-19 NOTE — PROGRESS NOTES
Post-Operative  Progress Note      Information for the patient's :  Himanshu Smoker [[id number]]   , Low Transverse      Patient doing well without significant complaint. Nausea and vomiting resolved. She is tolerating oral intake. Pain well controlled. HA and neck pain improved. Delivery information:  Information for the patient's :  Himanshu Smoker [[id number]]   Delivery of a 7 lb 3.2 oz (3.266 kg) female infant via , Low Transverse on 2021 at 7:33 PM  by Laura Oliver. Apgars were 7  and 8 .        Vitals:  Patient Vitals for the past 12 hrs:   Temp Pulse Resp BP   21 0804 98 °F (36.7 °C) 79 18 124/79   21 0400 98.7 °F (37.1 °C) 90 16 (!) 142/86       Temp (24hrs), Av.9 °F (37.2 °C), Min:98 °F (36.7 °C), Max:100.4 °F (38 °C)      Last 24hr Input/Output:    Intake/Output Summary (Last 24 hours) at 2021 1058  Last data filed at 2021 0732  Gross per 24 hour   Intake --   Output 1000 ml   Net -1000 ml        Exam:    Gen: Patient without distress  Lungs: clear to ascultation bilaterally  Cor: S1, S2, regular rate and rhythm  Abdomen: bowel sounds present, soft, appropriate tenderness, fundus firm   Incision: clean, dry and intact dressing  Lower extremities: negative for cords or tenderness, trace edema bilaterally    Labs:   Recent Results (from the past 24 hour(s))   CBC WITH AUTOMATED DIFF    Collection Time: 21  4:16 AM   Result Value Ref Range    WBC 14.1 (H) 3.6 - 11.0 K/uL    RBC 3.43 (L) 3.80 - 5.20 M/uL    HGB 10.1 (L) 11.5 - 16.0 g/dL    HCT 31.1 (L) 35.0 - 47.0 %    MCV 90.7 80.0 - 99.0 FL    MCH 29.4 26.0 - 34.0 PG    MCHC 32.5 30.0 - 36.5 g/dL    RDW 13.8 11.5 - 14.5 %    PLATELET 120 (L) 808 - 400 K/uL    MPV 13.0 (H) 8.9 - 12.9 FL    NRBC 0.0 0  WBC    ABSOLUTE NRBC 0.00 0.00 - 0.01 K/uL    NEUTROPHILS 82 (H) 32 - 75 %    LYMPHOCYTES 10 (L) 12 - 49 %    MONOCYTES 6 5 - 13 %    EOSINOPHILS 1 0 - 7 % BASOPHILS 0 0 - 1 %    IMMATURE GRANULOCYTES 1 (H) 0.0 - 0.5 %    ABS. NEUTROPHILS 11.6 (H) 1.8 - 8.0 K/UL    ABS. LYMPHOCYTES 1.4 0.8 - 3.5 K/UL    ABS. MONOCYTES 0.8 0.0 - 1.0 K/UL    ABS. EOSINOPHILS 0.1 0.0 - 0.4 K/UL    ABS. BASOPHILS 0.1 0.0 - 0.1 K/UL    ABS. IMM. GRANS. 0.1 (H) 0.00 - 0.04 K/UL    DF AUTOMATED         Lab Results   Component Value Date/Time    HGB 10.1 (L) 2021 04:16 AM    Hgb, External 11.2 2020 12:00 AM       Assessment:   Post-Op , stable POP  POP anemia, acute intraoperative blood loss, stable  Gross hematuria  SIPIH on labetalol  Plan:     1. Routine post-operative care  2. Encouraged out of bed and ambulation  3. Oral pain medications  4. Advance diet  5. Continue postpartum and  teaching by nursing  6.  Continue patel, ivf, CMP, will monitor    Chris Carranza MD

## 2021-05-19 NOTE — L&D DELIVERY NOTE
Delivery Summary    Patient: Divya Aponte MRN: [id number]  SSN: [id number]    YOB: 1990  Age: 27 y.o. Sex: female        Information for the patient's :  Kori Birmingham [[id number]]       Labor Events:    Labor: No    Steroids: Full Course   Cervical Ripening Date/Time: 2021 5:45 PM   Cervical Ripening Type: Rojas/EASI   Antibiotics During Labor: Yes   Rupture Identifier:      Rupture Date/Time: 2021 7:36 AM   Rupture Type:     Amniotic Fluid Volume:      Amniotic Fluid Description: Clear    Amniotic Fluid Odor: None    Induction: Rojas Bulb (balloon)       Induction Date/Time: 2021 5:45 PM    Indications for Induction: Mild Preeclampsia;Chronic Hypertension    Augmentation: None   Augmentation Date/Time:      Indications for Augmentation:     Labor complications:          Additional complications:        Delivery Events:  Indications For Episiotomy:     Episiotomy:     Perineal Laceration(s):     Repaired:     Periurethral Laceration Location:      Repaired:     Labial Laceration Location:     Repaired:     Sulcal Laceration Location:     Repaired:     Vaginal Laceration Location:     Repaired:     Cervical Laceration Location:     Repaired:     Repair Suture:     Number of Repair Packets:     Estimated Blood Loss (ml):  ml   Quantitaive Blood Loss (ml):             Delivery Date: 2021    Delivery Time: 7:33 PM   Delivery Type: , Low Transverse     Details    Trial of Labor: Yes   Primary/Repeat: Primary   Priority: Routine   Indications:  Failure to Progress       Sex:  Female     Gestational Age: 36w7d  Delivery Clinician:  Patrizia Fuchs  Living Status: Living   Delivery Location: L&D            APGARS  One minute Five minutes Ten minutes   Skin color: 0   0        Heart rate: 2   2        Grimace: 1   2        Muscle tone: 2   2        Breathin   2        Totals: 7   8          Presentation: Vertex    Position:   Occiput Resuscitation Method:  Suctioning-bulb; Tactile Stimulation;PPV;Oxygen;C-PAP     Meconium Stained: Terminal      Cord Information: 3 Vessels  Complications: Nuchal Cord Without Compressions  Cord around: head  Delayed cord clamping? No  Cord clamped date/time:   Disposition of Cord Blood: Lab    Blood Gases Sent?: Yes    Placenta:  Date/Time: 2021  7:34 PM  Removal: Manual Removal      Appearance: Normal      Measurements:  Birth Weight: 7 lb 3.2 oz (3.266 kg)      Birth Length: 1' 8\" (0.508 m)      Head Circumference: 1' 0.99\" (0.33 m)      Chest Circumference: 1' 1.19\" (0.335 m)     Abdominal Girth: 1' 0.6\" (0.32 m)    Other Providers:   SANDY LIVINGSTON;MILAGROS JAMES;LIEN PATTERSON;Wing Maddie GARCIA, Obstetrician;Primary Nurse;Primary  Nurse;Tech;Tech             Group B Strep:   Lab Results   Component Value Date/Time    GrBStrep, External positive 2021 12:00 AM     Information for the patient's :  Shawn Cash [[id number]]   No results found for: ABORH, PCTABR, PCTDIG, BILI, ABORHEXT, ABORH     No results for input(s): PCO2CB, PO2CB, HCO3I, SO2I, IBD, PTEMPI, SPECTI, PHICB, ISITE, IDEV, IALLEN in the last 72 hours.

## 2021-05-19 NOTE — ANESTHESIA POSTPROCEDURE EVALUATION
Procedure(s):   SECTION. epidural    Anesthesia Post Evaluation      Multimodal analgesia: multimodal analgesia used between 6 hours prior to anesthesia start to PACU discharge  Patient location during evaluation: PACU  Patient participation: complete - patient participated  Level of consciousness: awake and alert  Pain management: adequate  Airway patency: patent  Anesthetic complications: no  Cardiovascular status: acceptable  Respiratory status: acceptable  Hydration status: acceptable  Post anesthesia nausea and vomiting:  none      INITIAL Post-op Vital signs:   Vitals Value Taken Time   /84 21   Temp 36.7 °C (98.1 °F) 21   Pulse 96 21   Resp 18 21   SpO2 96 % 21   Vitals shown include unvalidated device data.

## 2021-05-20 PROCEDURE — 74011250637 HC RX REV CODE- 250/637: Performed by: OBSTETRICS & GYNECOLOGY

## 2021-05-20 PROCEDURE — 65270000029 HC RM PRIVATE

## 2021-05-20 RX ORDER — IBUPROFEN 600 MG/1
600 TABLET ORAL
Qty: 30 TABLET | Refills: 0 | Status: SHIPPED | OUTPATIENT
Start: 2021-05-20 | End: 2022-09-15

## 2021-05-20 RX ORDER — HYDROCODONE BITARTRATE AND ACETAMINOPHEN 5; 325 MG/1; MG/1
1 TABLET ORAL
Qty: 10 TABLET | Refills: 0 | Status: SHIPPED | OUTPATIENT
Start: 2021-05-20 | End: 2021-05-27

## 2021-05-20 RX ADMIN — LABETALOL HYDROCHLORIDE 200 MG: 200 TABLET, FILM COATED ORAL at 18:29

## 2021-05-20 RX ADMIN — BUTALBITAL, ACETAMINOPHEN, AND CAFFEINE 1 TABLET: 50; 325; 40 TABLET ORAL at 05:03

## 2021-05-20 RX ADMIN — HYDROCODONE BITARTRATE AND ACETAMINOPHEN 1 TABLET: 5; 325 TABLET ORAL at 05:18

## 2021-05-20 RX ADMIN — IBUPROFEN 800 MG: 800 TABLET, FILM COATED ORAL at 15:21

## 2021-05-20 RX ADMIN — HYDROCODONE BITARTRATE AND ACETAMINOPHEN 1 TABLET: 5; 325 TABLET ORAL at 12:52

## 2021-05-20 RX ADMIN — HYDROCODONE BITARTRATE AND ACETAMINOPHEN 1 TABLET: 5; 325 TABLET ORAL at 20:40

## 2021-05-20 RX ADMIN — IBUPROFEN 800 MG: 800 TABLET, FILM COATED ORAL at 07:00

## 2021-05-20 RX ADMIN — IBUPROFEN 800 MG: 800 TABLET, FILM COATED ORAL at 23:25

## 2021-05-20 RX ADMIN — LABETALOL HYDROCHLORIDE 200 MG: 200 TABLET, FILM COATED ORAL at 08:42

## 2021-05-20 RX ADMIN — HYDROCODONE BITARTRATE AND ACETAMINOPHEN 1 TABLET: 5; 325 TABLET ORAL at 08:42

## 2021-05-20 RX ADMIN — CYCLOBENZAPRINE 10 MG: 10 TABLET, FILM COATED ORAL at 11:41

## 2021-05-20 RX ADMIN — HYDROCODONE BITARTRATE AND ACETAMINOPHEN 1 TABLET: 5; 325 TABLET ORAL at 17:04

## 2021-05-20 RX ADMIN — BUPROPION HYDROCHLORIDE 150 MG: 150 TABLET, EXTENDED RELEASE ORAL at 07:00

## 2021-05-20 RX ADMIN — Medication 1 TABLET: at 08:42

## 2021-05-20 NOTE — PROGRESS NOTES
Late entry from 5pm rounding    Patient Vitals for the past 12 hrs:   Temp Pulse Resp BP   21 1738 99.2 °F (37.3 °C) 88 -- 138/73   21 1236 97.5 °F (36.4 °C) 80 16 126/70       CMP:   Lab Results   Component Value Date/Time     2021 11:34 AM    K 4.2 2021 11:34 AM     2021 11:34 AM    CO2 27 2021 11:34 AM    AGAP 3 (L) 2021 11:34 AM    GLU 73 2021 11:34 AM    BUN 10 2021 11:34 AM    CREA 0.56 2021 11:34 AM    GFRAA >60 2021 11:34 AM    GFRNA >60 2021 11:34 AM    CA 7.9 (L) 2021 11:34 AM    ALB 1.9 (L) 2021 11:34 AM    TP 5.0 (L) 2021 11:34 AM    GLOB 3.1 2021 11:34 AM    AGRAT 0.6 (L) 2021 11:34 AM    ALT 13 2021 11:34 AM     Date 21 1900 - 21 0659 21 0700 - 21 0659   Shift 7660-3480 24 Hour Total 2831-7034 2424-7709 24 Hour Total   INTAKE   Shift Total(mL/kg)        OUTPUT   Urine(mL/kg/hr) 40 440 1090(1)  1090     Urine  0        Urine Output (mL) (Urinary Catheter 21 Patel) 40 440 1090  1090   Blood 720 720        Quantitative Blood Loss 720 720      Shift Total(mL/kg) 760(8) 1160(12.2) 1090(11.4)  1090(11.4)   NET -760 -1160 -1090  -1090   Weight (kg) 95.3 95.3 95.3 95.3 95.3     Urine clearing in patel tube. Will continue catheter overnight and consider removal in am.    POP 1 CS with hematuria, improved.     Wanetta Apgar, MD

## 2021-05-20 NOTE — DISCHARGE INSTRUCTIONS
Patient Education   Patient Education   Patient Education             [facility]  [email or web address]  [address] [place], FACOG     POST DELIVERY DISCHARGE INSTRUCTIONS  FROM YOUR PHYSICIAN    Name: Roger Joseph  YOB: 1990    General:     Read all discharge information provided by the hospital    Diet/Diet Restrictions:  Eat healthy meals and snacks as desired. Eat foods that are high in fiber and low in fat and cholesterol. Drink eight 8-ounce glasses of water daily; avoid excessive caffeine intake. [email or web address]    Medications:   See discharge medication list and read instructions carefully. Breast Feeding:  See instructions from your lactation consult. Call [phone] for more information or to locate a lactation consultant. [email or web address]    Vaccines:  If you received the MMR vaccine postpartum you should wait three months until you get pregnant again. You, and close contacts, should make sure that the Tdap vaccine is up to date. This vaccine can decrease the risk of your baby getting pertussis or \"whooping cough. \"    You, and close contacts, should receive the influenza vaccine during flu season when appropriate. [email or web address].[email or web address]    Tobacco Use: If you (or other people around the baby) smoke or use tobacco products, please try to  use and quit to improve your health and decrease risk to your baby. [email or web address]. htm    Swelling in your Legs:  There are many fluid changes after delivery and you may have more swelling the first few days after delivery  Continue to drink plenty of water, avoid sitting or standing in one position for too long and elevate your feet above your heart, to help reduce some the pressure you may be feeling in your ankles and legs.  Section Incision:  Steri-strips or tape strips may be removed gently at home approximately 7- 10 days after surgery. Soaking the strips with a warm, wet cloth or taking a shower may make the strips easier to remove. Metal staples are usually removed within 3 to 10 days, either before you leave the hospital or in the office. Make an appointment if needed. Insorb absorbable staples may be used under the skin but you may see small white pieces as they dissolve. Skin glue or dermabond will fall off with time. Abdominal incisions should be kept clean by showering. It is not necessary to put soap on the incision; plain tap water is adequate. Avoid scrubbing the area and pat dry. The way your scar looks will change over time and may not reach its final appearance for up to a year. The area may feel either numb or sensitive to touch, which is normal.         Physical Activity / Restrictions / Safety:     Avoid heavy lifting, no more that 10 pounds, for 2-3 weeks. No driving while taking narcotic pain medication, of if you can not slam on the brakes. No intercourse for 4-6 weeks, no douching or tampon use until seen by your doctor for your postpartum visit. Use condoms as needed for contraception with sexual activity. You may resume normal exercise after you are cleared by your physician at your postpartum check. You may walk for exercise, as tolerated. Discharge Instructions/Special Treatment/Home Care Needs:     Continue your prenatal vitamins while breast feeding or pumping. Continue to use a squirt bottle with warm water on your perineum/bottom/episiotomy after each bathroom use until bleeding stops. Take stool softeners daily. For example, docusate over the counter stool softener. This is especially important if you are taking narcotic pain medications, because they can cause constipation. Call your doctor for the following:      If you have a fever over 100.4 degrees by mouth on two readings. If you have persistent vaginal bleeding heavier than a heavy menstrual period or persistent large clots or if you are bleeding so heavy it is making you feel weak. It is normal to pass larger clots when you first get out of bed: but if they persist, notify your physician. If you have red streaks or increased swelling of legs, painful red streaks on your breast.  If you have painful urination, or increased pain, redness or discharge with your incision. If you have any questions or concerns. Pain Management:     Take Acetaminophen (Tylenol), Ibuprofen (Advil, Motrin), prescribed pain medications as directed for pain. Do not take Perocet with Tylenol, they both contain acetaminophen. Use a warm water Sitz bath 3 times daily to relieve episiotomy, bottom/perineum or hemorrhoidal discomfort. Apply heating pad to  incision as needed. For hemorrhoidal discomfort, you can use Tucks and Anusol cream as needed and directed. NSAID information for patients:  [email or web address]    Pain medication/narcotic information for patients:   Take your medicine exactly as prescribed   Store your medicine away from children and in a safe  place   Do not give your medicine to others   Do not drink alcohol while taking this medicine    Anemia:  Your results show some anemia, or low blood count. You should start, or add, an over the counter elemental iron supplement of 45-65 mg. Consider 'Slow FE' or ferrous sulfate 325 mg once a day for at least three months. Also take vitamin C 250 mg and a daily prenatal vitamin to help anemia. Add a stool softener, like docusate or colace 100 mg (2x/day) to avoid constipation.   If you do not tolerate supplements you can try blackstrap izzy (1 tbsp=27mg elemental iron). Follow-Up Care:     Appointment with MD:  Dr. Huber Bender  [phone]  Schedule your postpartum visit for six weeks and one week        Additional Discharge Instructions     Please read all of your discharge instructions   Follow all of your medication instructions carefully   Call our office on the next business day to schedule your follow-up appointment   If you have any questions or concerns, please contact us at [phone] or if the situation is urgent contact [phone]   Become a [facility] My Chart user so you can access information, results and appointments: go to [email or web address] - Technov[employer] Reality. Your Survival/IMAGINATE - Technov[employer] Reality. You may receive a survey about your delivery. Please take a minute to give us your feedback, and we hope that you were fully satisfied with your care. If you did not receive excellent communication, compassionate care and an outstanding patient experience, please notify Louie Lemus, the practice manger, or myself at [phone] or discuss your concerns with me at your next visit so that we can always meet our mission and your expectations. Thank you. Dr. León Guidry MD  [address] [place] [address], [address]  [email or web address]   [phone]   Good Help to Those in Need®    Preeclampsia: Care Instructions  Overview     Preeclampsia occurs when a woman's blood pressure rises during pregnancy. Often with preeclampsia, you also have swelling in your legs, hands, and face. A test may show too much protein in your urine. Preeclampsia is also called toxemia. If preeclampsia is severe and not treated, it can lead to seizures (eclampsia) and damage to your liver or kidneys. Preeclampsia can prevent your baby from getting enough food and oxygen. This can cause a low birth weight or other problems. Your doctor will watch you closely to prevent these problems.  He or she also may recommend that you reduce your activity. If your preeclampsia is a danger to your health or the health of your baby, your doctor may need to deliver your baby early. While preeclampsia is a concern, most women with preeclampsia have healthy babies. After a woman gives birth, preeclampsia usually goes away on its own. But symptoms may last a few weeks or more and can get worse after delivery. Rarely, symptoms of preeclampsia don't show up until days or even weeks after childbirth. Follow-up care is a key part of your treatment and safety. Be sure to make and go to all appointments, and call your doctor if you are having problems. It's also a good idea to know your test results and keep a list of the medicines you take. How can you care for yourself at home? · Take and record your blood pressure at home if your doctor tells you to. ? Learn the importance of the two measures of blood pressure (such as 120 over 80, or 120/80). The first number is the systolic pressure, which is the force of blood on the artery walls as the heart pumps. The second number is the diastolic pressure, which is the force of blood on the artery walls between heartbeats, when the heart is at rest. You have a choice of monitors to use. ? Manual monitor: You pump up the cuff and use a stethoscope to listen for your pulse. ? Electronic monitor: The cuff inflates, and a gauge shows your pulse rate. ? To take your blood pressure:  ? Ask your doctor to check your blood pressure monitor to be sure that it is accurate and that the cuff fits you. Also ask your doctor to watch you to make sure that you are using it right. ? You should not eat, use tobacco products, or use medicine known to raise blood pressure (such as some nasal decongestant sprays) before you take your blood pressure. ? Avoid taking your blood pressure if you have just exercised. Also avoid taking it if you are nervous or upset. Rest at least 15 minutes before you take your blood pressure.   · If your doctor advises, check the protein levels in your urine. Your doctor or nurse will show you how to do this. · Take your medicines exactly as prescribed. Call your doctor if you think you are having a problem with your medicine. · Do not smoke. Quitting smoking will help improve your baby's growth and health. If you need help quitting, talk to your doctor about stop-smoking programs and medicines. These can increase your chances of quitting for good. · Eat a balanced and healthy diet that has lots of fruits and vegetables. · If your doctor advised bed rest, be sure to stay off your feet and rest as much as possible. ? Keep a phone, notepad, and pen near the bed where you can easily reach them. ? Gently stretch your legs every hour to maintain good blood flow. ? Have another family member pack snacks and lunch food in a cooler close to your bed. ? Use this time for activities that you usually cannot find time for, such as reading, craft projects, or letter writing. · You can keep track of your baby's health by noting the length of time it takes to count 10 movements (such as kicks, flutters, or rolls). Feeling 10 movements in less than 1 hour is considered normal. Track your baby's movements once each day. Bring this record with you to each prenatal visit. When should you call for help? Call 911 anytime you think you may need emergency care. For example, call if:    · You passed out (lost consciousness).     · You have a seizure. Call your doctor now or seek immediate medical care if:    · You have symptoms of preeclampsia, such as:  ? Sudden swelling of your face, hands, or feet. ? New vision problems (such as dimness, blurring, or seeing spots). ? A severe headache.     · Your blood pressure is higher than it should be, or it rises suddenly.     · You have new nausea or vomiting.     · You think that you are in labor.     · You have pain in your belly or pelvis.    Watch closely for changes in your health, and be sure to contact your doctor if:    · You gain weight rapidly. Where can you learn more? Go to http://www.gray.com/  Enter Z954 in the search box to learn more about \"Preeclampsia: Care Instructions. \"  Current as of: 2020               Content Version: 12.8   Clear Standards. Care instructions adapted under license by [employer] (which disclaims liability or warranty for this information). If you have questions about a medical condition or this instruction, always ask your healthcare professional. [place] [address] any warranty or liability for your use of this information. Pregnancy Precautions: Care Instructions  Your Care Instructions     There is no sure way to prevent labor before your due date ( labor) or to prevent most other pregnancy problems. But there are things you can do to increase your chances of a healthy pregnancy. Go to your appointments, follow your doctor's advice, and take good care of yourself. Eat well, and exercise (if your doctor agrees). And make sure to drink plenty of water. Follow-up care is a key part of your treatment and safety. Be sure to make and go to all appointments, and call your doctor if you are having problems. It's also a good idea to know your test results and keep a list of the medicines you take. How can you care for yourself at home? · Make sure you go to your prenatal appointments. At each visit, your doctor will check your blood pressure. Your doctor will also check to see if you have protein in your urine. High blood pressure and protein in urine are signs of preeclampsia. This condition can be dangerous for you and your baby. · Drink plenty of fluids. Dehydration can cause contractions. If you have kidney, heart, or liver disease and have to limit fluids, talk with your doctor before you increase the amount of fluids you drink.   · Tell your doctor right away if you notice any symptoms of an infection, such as:  ? Burning when you urinate. ? A foul-smelling discharge from your vagina. ? Vaginal itching. ? Unexplained fever. ? Unusual pain or soreness in your uterus or lower belly. · Eat a balanced diet. Include plenty of foods that are high in calcium and iron. ? Foods high in calcium include milk, cheese, yogurt, almonds, and broccoli. ? Foods high in iron include red meat, shellfish, poultry, eggs, beans, raisins, whole-grain bread, and leafy green vegetables. · Do not smoke. If you need help quitting, talk to your doctor about stop-smoking programs and medicines. These can increase your chances of quitting for good. · Do not drink alcohol or use marijuana or illegal drugs. · Follow your doctor's directions about activity. Your doctor will let you know how much, if any, exercise you can do. · Ask your doctor if you can have sex. If you are at risk for early labor, your doctor may ask you to not have sex. · Take care to prevent falls. During pregnancy, your joints are loose, and your balance is off. Sports such as bicycling, skiing, or in-line skating can increase your risk of falling. And don't ride horses or motorcycles, dive, water ski, scuba dive, or parachute jump while you are pregnant. · Avoid getting very hot. Do not use saunas or hot tubs. Avoid staying out in the sun in hot weather for long periods. Take acetaminophen (Tylenol) to lower a high fever. · Do not take any over-the-counter or herbal medicines or supplements without talking to your doctor or pharmacist first.  When should you call for help? Call 911 anytime you think you may need emergency care.  For example, call if:    · You passed out (lost consciousness).     · You have a seizure.     · You have severe vaginal bleeding.     · You have severe pain in your belly or pelvis.     · You have had fluid gushing or leaking from your vagina and you know or think the umbilical cord is bulging into your vagina. If this happens, immediately get down on your knees so your rear end (buttocks) is higher than your head. This will decrease the pressure on the cord until help arrives. Call your doctor now or seek immediate medical care if:    · You have signs of preeclampsia, such as:  ? Sudden swelling of your face, hands, or feet. ? New vision problems (such as dimness, blurring, or seeing spots). ? A severe headache.     · You have any vaginal bleeding.     · You have belly pain or cramping.     · You have a fever.     · You have had regular contractions (with or without pain) for an hour. This means that you have 8 or more within 1 hour or 4 or more in 20 minutes after you change your position and drink fluids.     · You have a sudden release of fluid from your vagina.     · You have low back pain or pelvic pressure that does not go away.     · You notice that your baby has stopped moving or is moving much less than normal.   Watch closely for changes in your health, and be sure to contact your doctor if you have any problems. Where can you learn more? Go to http://www.elliott.com/  Enter Y951 in the search box to learn more about \"Pregnancy Precautions: Care Instructions. \"  Current as of: October 8, 2020               Content Version: 12.8  © 2006-2021 [employer]. Care instructions adapted under license by [employer] (which disclaims liability or warranty for this information). If you have questions about a medical condition or this instruction, always ask your healthcare professional. [place] [address] any warranty or liability for your use of this information. Counting Your Baby's Kicks: Care Instructions  Your Care Instructions     Counting your baby's kicks is one way your doctor can tell that your baby is healthy.  Most women--especially in a first pregnancy--feel their baby move for the first time between 16 and 22 weeks. The movement may feel like flutters rather than kicks. Your baby may move more at certain times of the day. When you are active, you may notice less kicking than when you are resting. At your prenatal visits, your doctor will ask whether the baby is active. In your last trimester, your doctor may ask you to count the number of times you feel your baby move. Follow-up care is a key part of your treatment and safety. Be sure to make and go to all appointments, and call your doctor if you are having problems. It's also a good idea to know your test results and keep a list of the medicines you take. How do you count fetal kicks? · A common method of checking your baby's movement is to count the number of kicks or moves you feel in 1 hour. Ten movements (such as kicks, flutters, or rolls) in 1 hour are normal. Some doctors suggest that you count in the morning until you get to 10 movements. Then you can quit for that day and start again the next day. · Pick your baby's most active time of day to count. This may be any time from morning to evening. · If you do not feel 10 movements in an hour, your baby may be sleeping. Wait for the next hour and count again. When should you call for help? Call your doctor now or seek immediate medical care if:    · You noticed that your baby has stopped moving or is moving much less than normal.   Watch closely for changes in your health, and be sure to contact your doctor if you have any problems. Where can you learn more? Go to http://www.gray.com/  Enter [id number] in the search box to learn more about \"Counting Your Baby's Kicks: Care Instructions. \"  Current as of: October 8, 2020               Content Version: 12.8  © 1269-5449 [employer]. Care instructions adapted under license by [employer] (which disclaims liability or warranty for this information).  If you have questions about a medical condition or this instruction, always ask your healthcare professional. [place] [address] any warranty or liability for your use of this information.

## 2021-05-20 NOTE — LACTATION NOTE
This note was copied from a baby's chart. Mom has decided to do both breat and formula feed. States she is worried about how much milk she is making. States she tried to pump with out any milk. Discussed supply and demand, and how usually the first couple of days you can get more drops from hand expression. Shown how to hand express and many drops noted. Pt will successfully establish breastfeeding by feeding in response to early feeding cues   or wake every 3h, will obtain deep latch, and will keep log of feedings/output. Taught to BF at hunger cues and or q 2-3 hrs and to offer 10-20 drops of hand expressed colostrum at any non-feeds.       Breast Assessment  Left Breast: Large  Left Nipple: Everted, Intact  Right Breast: Large  Right Nipple: Everted, Intact  Breast- Feeding Assessment  Attends Breast-Feeding Classes: Yes  Breast-Feeding Experience: No  Breast Trauma/Surgery: No  Type/Quality: Attempted  Lactation Consultant Visits  Breast-Feedings: Attempted breast-feeding  Mother/Infant Observation  Mother Observation: Alignment, Breast comfortable, Close hold, Holds breast, Recognizes feeding cues  Infant Observation: Feeding cues, Frenulum checked, Opens mouth

## 2021-05-20 NOTE — DISCHARGE SUMMARY
Obstetrical Discharge Summary     Name: Drew Prakash MRN: [id number]  SSN: [id number]    YOB: 1990  Age: 27 y.o. Sex: female      Admit Date: 2021    Discharge Date: 2021    Admitting Physician: Nam Good MD     Attending Physician:  Ritchie Dobbins MD     Admission Diagnoses: Hypertension in pregnancy [O16.9]    Condition on Discharge: Stable    Procedures: LTCS    Disposition: to home    Follow up: Follow-up Appointments   Procedures    FOLLOW UP VISIT Appointment in: One Week BP check and six weeks postpartum check     BP check and six weeks postpartum check     Standing Status:   Standing     Number of Occurrences:   1     Order Specific Question:   Appointment in     Answer: One Week        Discharge Diagnoses:   Information for the patient's :  Devin Scott [[id number]]   Delivery of a 7 lb 3.2 oz (3.266 kg) female infant via , Low Transverse on 2021 at 7:33 PM  by Jesi Mcdonald. Apgars were 7  and 8 . Additional Diagnoses:   Hospital Problems  Date Reviewed: 2021        Codes Class Noted POA    Hypertension in pregnancy ICD-10-CM: O16.9  ICD-9-CM: 642.90  4/15/2021 Yes             Lab Results   Component Value Date/Time    Rubella, External immune 2020 12:00 AM    GrBStrep, External positive 2021 12:00 AM       Hospital Course:   She was admitted for elevated BP and diagnosed with [facility], her antepartum course was complicated by labile BP and sever range BP necessitated IOL with a cervical patel at 36 6/7. A  section was performed because of arrest of descent/second stage arrest.  Her BP was mostly controlled with labetalol 200mg BID. Postpartum course was complicated by gross hematuria, which added 0 days to the patient's length of stay. Her diet was advanced postoperative and she was tolerating general diet on the day of discharge. Her patel was discontinued and she was able to void spontaneously.   Her pain was controlled with oral pain medications once she was able to tolerate oral intake. She will start on oral iron postpartum. WBC   Date Value Ref Range Status   2021 14.1 (H) 3.6 - 11.0 K/uL Final     RBC   Date Value Ref Range Status   2021 3.43 (L) 3.80 - 5.20 M/uL Final     HGB   Date Value Ref Range Status   2021 10.1 (L) 11.5 - 16.0 g/dL Final     HCT   Date Value Ref Range Status   2021 31.1 (L) 35.0 - 47.0 % Final     PLATELET   Date Value Ref Range Status   2021 138 (L) 150 - 400 K/uL Final     Hgb, External   Date Value Ref Range Status   2020 11.2  Final     Hct, External   Date Value Ref Range Status   2020 33.9  Final     Platelet cnt., External   Date Value Ref Range Status   2020 230  Final         Patient Instructions:   Current Discharge Medication List      START taking these medications    Details   ibuprofen (MOTRIN) 600 mg tablet Take 1 Tablet by mouth every six (6) hours as needed for Pain. Take with food. Qty: 30 Tablet, Refills: 0      HYDROcodone-acetaminophen (NORCO) 5-325 mg per tablet Take 1 Tablet by mouth every four (4) hours as needed for Pain for up to 7 days. Max Daily Amount: 6 Tablets. Qty: 10 Tablet, Refills: 0    Associated Diagnoses: Pre-existing hypertension during pregnancy in third trimester, unspecified pre-existing hypertension type; S/P          CONTINUE these medications which have NOT CHANGED    Details   buPROPion XL (WELLBUTRIN XL) 150 mg tablet Take 1 Tab by mouth every morning. Qty: 30 Tab, Refills: 1      prenatal vit-iron fumarate-fa 27 mg iron- 0.8 mg tab tablet Take 1 Tab by mouth daily. STOP taking these medications       labetaloL (NORMODYNE) 100 mg tablet Comments:   Reason for Stopping:         BABY ASPIRIN PO Comments:   Reason for Stopping:               Reference my discharge instructions.       Signed By:  Sheri Soares MD     May 20, 2021

## 2021-05-20 NOTE — PROGRESS NOTES
Post-Operative  Progress Note      Information for the patient's :  Abel Mitchell [[id number]]   , Low Transverse      Patient doing well without significant complaint. Nausea and vomiting resolved. She is tolerating oral intake. Pain well controlled. Delivery information:  Information for the patient's :  Abel Mitchell [[id number]]   Delivery of a 7 lb 3.2 oz (3.266 kg) female infant via , Low Transverse on 2021 at 7:33 PM  by Jenae Stone. Apgars were 7  and 8 . Vitals:  Patient Vitals for the past 12 hrs:   Temp Pulse Resp BP   21 1143 -- 77 16 (!) 147/77   21 0810 98.2 °F (36.8 °C) 72 18 137/72   21 0312 98 °F (36.7 °C) 67 18 137/79       Temp (24hrs), Av.5 °F (36.9 °C), Min:98 °F (36.7 °C), Max:99.2 °F (37.3 °C)      Last 24hr Input/Output:    Intake/Output Summary (Last 24 hours) at 2021 1302  Last data filed at 2021 4736  Gross per 24 hour   Intake --   Output 1900 ml   Net -1900 ml        Exam:    Gen: Patient without distress  Lungs: clear to ascultation bilaterally  Cor: S1, S2, regular rate and rhythm  Abdomen: bowel sounds present, soft, appropriate tenderness, fundus firm   Incision: clean, dry and intact  Lower extremities: negative for cords or tenderness, trace edema bilaterally    Clear yellow urine in catheter and collection bag    Labs:   No results found for this or any previous visit (from the past 24 hour(s)). Lab Results   Component Value Date/Time    HGB 10.1 (L) 2021 04:16 AM    Hgb, External 11.2 2020 12:00 AM       Assessment:   Post-Op , stable  POP 2  SIPIH on labetalol  Hematuria, gross, resolved    Plan:     1. Routine post-operative care  2. Encouraged out of bed and ambulation  3. Oral pain medications  4. Advance diet  5. Continue postpartum and  teaching by nursing  6.  Remove jorge Jacobson MD

## 2021-05-20 NOTE — PROGRESS NOTES
Bedside and Verbal shift change report given to Asha Lang RN (oncoming nurse) by Ron Fleischer RNC (offgoing nurse). Report included the following information SBAR, Kardex, Intake/Output, MAR and Recent Results.

## 2021-05-20 NOTE — PROGRESS NOTES
Bedside and Verbal shift change report given to Kashmir Lynn RN (oncoming nurse) by Bettina Lazo RN (offgoing nurse). Report included the following information SBAR, Kardex, Procedure Summary, Intake/Output, MAR and Recent Results.

## 2021-05-21 VITALS
DIASTOLIC BLOOD PRESSURE: 91 MMHG | HEIGHT: 65 IN | SYSTOLIC BLOOD PRESSURE: 153 MMHG | TEMPERATURE: 97.9 F | BODY MASS INDEX: 34.99 KG/M2 | RESPIRATION RATE: 16 BRPM | HEART RATE: 74 BPM | OXYGEN SATURATION: 93 % | WEIGHT: 210 LBS

## 2021-05-21 LAB
ALBUMIN SERPL-MCNC: 2.2 G/DL (ref 3.5–5)
ALBUMIN/GLOB SERPL: 0.8 {RATIO} (ref 1.1–2.2)
ALP SERPL-CCNC: 114 U/L (ref 45–117)
ALT SERPL-CCNC: 16 U/L (ref 12–78)
ANION GAP SERPL CALC-SCNC: 5 MMOL/L (ref 5–15)
AST SERPL-CCNC: 17 U/L (ref 15–37)
BASOPHILS # BLD: 0 K/UL (ref 0–0.1)
BASOPHILS NFR BLD: 0 % (ref 0–1)
BILIRUB SERPL-MCNC: 0.3 MG/DL (ref 0.2–1)
BUN SERPL-MCNC: 8 MG/DL (ref 6–20)
BUN/CREAT SERPL: 17 (ref 12–20)
CALCIUM SERPL-MCNC: 7.5 MG/DL (ref 8.5–10.1)
CHLORIDE SERPL-SCNC: 109 MMOL/L (ref 97–108)
CO2 SERPL-SCNC: 27 MMOL/L (ref 21–32)
CREAT SERPL-MCNC: 0.46 MG/DL (ref 0.55–1.02)
DIFFERENTIAL METHOD BLD: ABNORMAL
EOSINOPHIL # BLD: 0 K/UL (ref 0–0.4)
EOSINOPHIL NFR BLD: 0 % (ref 0–7)
ERYTHROCYTE [DISTWIDTH] IN BLOOD BY AUTOMATED COUNT: 14.3 % (ref 11.5–14.5)
GLOBULIN SER CALC-MCNC: 2.9 G/DL (ref 2–4)
GLUCOSE SERPL-MCNC: 75 MG/DL (ref 65–100)
HCT VFR BLD AUTO: 25.4 % (ref 35–47)
HGB BLD-MCNC: 8.3 G/DL (ref 11.5–16)
IMM GRANULOCYTES # BLD AUTO: 0 K/UL
IMM GRANULOCYTES NFR BLD AUTO: 0 %
LYMPHOCYTES # BLD: 3.2 K/UL (ref 0.8–3.5)
LYMPHOCYTES NFR BLD: 35 % (ref 12–49)
MCH RBC QN AUTO: 29.9 PG (ref 26–34)
MCHC RBC AUTO-ENTMCNC: 32.7 G/DL (ref 30–36.5)
MCV RBC AUTO: 91.4 FL (ref 80–99)
MONOCYTES # BLD: 0.2 K/UL (ref 0–1)
MONOCYTES NFR BLD: 2 % (ref 5–13)
NEUTS BAND NFR BLD MANUAL: 5 % (ref 0–6)
NEUTS SEG # BLD: 5.6 K/UL (ref 1.8–8)
NEUTS SEG NFR BLD: 58 % (ref 32–75)
NRBC # BLD: 0 K/UL (ref 0–0.01)
NRBC BLD-RTO: 0 PER 100 WBC
PLATELET # BLD AUTO: 170 K/UL (ref 150–400)
PMV BLD AUTO: 12.7 FL (ref 8.9–12.9)
POTASSIUM SERPL-SCNC: 4.1 MMOL/L (ref 3.5–5.1)
PROT SERPL-MCNC: 5.1 G/DL (ref 6.4–8.2)
RBC # BLD AUTO: 2.78 M/UL (ref 3.8–5.2)
RBC MORPH BLD: ABNORMAL
SODIUM SERPL-SCNC: 141 MMOL/L (ref 136–145)
WBC # BLD AUTO: 9 K/UL (ref 3.6–11)

## 2021-05-21 PROCEDURE — 74011250637 HC RX REV CODE- 250/637: Performed by: OBSTETRICS & GYNECOLOGY

## 2021-05-21 PROCEDURE — 80053 COMPREHEN METABOLIC PANEL: CPT

## 2021-05-21 PROCEDURE — 36415 COLL VENOUS BLD VENIPUNCTURE: CPT

## 2021-05-21 PROCEDURE — 85025 COMPLETE CBC W/AUTO DIFF WBC: CPT

## 2021-05-21 RX ADMIN — LABETALOL HYDROCHLORIDE 200 MG: 200 TABLET, FILM COATED ORAL at 09:05

## 2021-05-21 RX ADMIN — HYDROCODONE BITARTRATE AND ACETAMINOPHEN 2 TABLET: 5; 325 TABLET ORAL at 05:23

## 2021-05-21 RX ADMIN — Medication 1 TABLET: at 09:05

## 2021-05-21 RX ADMIN — HYDROCODONE BITARTRATE AND ACETAMINOPHEN 1 TABLET: 5; 325 TABLET ORAL at 09:06

## 2021-05-21 RX ADMIN — HYDROCODONE BITARTRATE AND ACETAMINOPHEN 2 TABLET: 5; 325 TABLET ORAL at 01:05

## 2021-05-21 RX ADMIN — IBUPROFEN 800 MG: 800 TABLET, FILM COATED ORAL at 06:32

## 2021-05-21 RX ADMIN — BUPROPION HYDROCHLORIDE 150 MG: 150 TABLET, EXTENDED RELEASE ORAL at 07:22

## 2021-05-21 NOTE — PROGRESS NOTES
Bedside and Verbal shift change report given to Rosmery Eugene RN (oncoming nurse) by Johanna Brantley RN (offgoing nurse). Report included the following information SBAR, Kardex, Intake/Output and MAR.

## 2021-05-21 NOTE — LACTATION NOTE
This note was copied from a baby's chart. Mother holding baby at breast, baby sleeping, no latch achieved. Mother states baby will not latch, just licks or is fussy at breast.  Baby has been formula fed every 3 hours. Reviewed ways to help coax baby to breast.  Mother noted to be wearing a under wire bra. Encouraged mother to not wear under wire while lactating, explained why. Printed info given on BF and bra resources. Chart shows numerous feedings, void, stool WNL. Discussed importance of monitoring outputs and feedings on first week of life. Discussed ways to tell if baby is  getting enough breast milk, ie  voids and stools, change in color of stool, and return to birth wt within 2 weeks. Follow up with pediatrician visit for weight check in 1-2 days (per AAP guidelines.)  Encouraged to call Warm Line  [phone]  for any questions/problems that arise. Mother also given breastfeeding support group dates and times for any future needs    Engorgement Care Guidelines:  Reviewed how milk is made and normal phases of milk production. Taught care of engorged breasts - frequent breastfeeding encouraged, cool packs and motrin as tolerated. Anticipatory guidance shared. Pt chooses to do both breast and bottle feeding, will follow recommendations to breastfeed first to help establish milk supply and only top off with formula, if needed, will be educated on potential consequences of early supplementation on breastfeeding success, but will be supported in decision to do both.      Breast Assessment  Left Breast: Large  Left Nipple: Everted, Intact  Right Breast: Large  Right Nipple: Everted, Intact  Breast- Feeding Assessment  Attends Breast-Feeding Classes: Yes  Breast-Feeding Experience: No  Breast Trauma/Surgery: No  Type/Quality: Attempted  Lactation Consultant Visits  Breast-Feedings: Attempted breast-feeding  Mother/Infant Observation  Mother Observation: Breast comfortable, Holds breast  Infant Observation: Opens mouth (no latch achieved)  LATCH Documentation  Latch:  Too sleepy or reluctant, no latch achieved  Audible Swallowing: None  Type of Nipple: Everted (after stimulation)  Comfort (Breast/Nipple): Soft/non-tender  Hold (Positioning): No assist from staff, mother able to position/hold infant  LATCH Score: 6

## 2021-05-21 NOTE — PROGRESS NOTES
Post-Operative  Progress Note      Information for the patient's :  Abel Mitchell [[id number]]   , Low Transverse      Patient doing well without significant complaint. Nausea and vomiting resolved. She is tolerating oral intake. Pain well controlled. HA improving. Delivery information:  Information for the patient's :  Abel Mitchell [[id number]]   Delivery of a 7 lb 3.2 oz (3.266 kg) female infant via , Low Transverse on 2021 at 7:33 PM  by Jenae Stone. Apgars were 7  and 8 .        Vitals:  Patient Vitals for the past 12 hrs:   Temp Pulse Resp BP   21 0836 97.9 °F (36.6 °C) 74 16 (!) 153/91   21 0238 98.6 °F (37 °C) 72 16 (!) 148/79       Temp (24hrs), Av.1 °F (36.7 °C), Min:97.6 °F (36.4 °C), Max:98.6 °F (37 °C)      Last 24hr Input/Output:    Intake/Output Summary (Last 24 hours) at 2021 1032  Last data filed at 2021 1230  Gross per 24 hour   Intake --   Output 250 ml   Net -250 ml        Exam:    Gen: Patient without distress  Lungs: clear to ascultation bilaterally  Cor: S1, S2, regular rate and rhythm  Abdomen: bowel sounds present, soft, appropriate tenderness, fundus firm   Incision: clean, dry and intact w steristrips  Lower extremities: negative for cords or tenderness, trace edema bilaterally    Labs:   Recent Results (from the past 24 hour(s))   CBC WITH AUTOMATED DIFF    Collection Time: 21  9:19 AM   Result Value Ref Range    WBC 9.0 3.6 - 11.0 K/uL    RBC 2.78 (L) 3.80 - 5.20 M/uL    HGB 8.3 (L) 11.5 - 16.0 g/dL    HCT 25.4 (L) 35.0 - 47.0 %    MCV 91.4 80.0 - 99.0 FL    MCH 29.9 26.0 - 34.0 PG    MCHC 32.7 30.0 - 36.5 g/dL    RDW 14.3 11.5 - 14.5 %    PLATELET 463 576 - 668 K/uL    MPV 12.7 8.9 - 12.9 FL    NRBC 0.0 0  WBC    ABSOLUTE NRBC 0.00 0.00 - 0.01 K/uL    NEUTROPHILS 58 32 - 75 %    BAND NEUTROPHILS 5 0 - 6 %    LYMPHOCYTES 35 12 - 49 %    MONOCYTES 2 (L) 5 - 13 %    EOSINOPHILS 0 0 - 7 %    BASOPHILS 0 0 - 1 %    IMMATURE GRANULOCYTES 0 %    ABS. NEUTROPHILS 5.6 1.8 - 8.0 K/UL    ABS. LYMPHOCYTES 3.2 0.8 - 3.5 K/UL    ABS. MONOCYTES 0.2 0.0 - 1.0 K/UL    ABS. EOSINOPHILS 0.0 0.0 - 0.4 K/UL    ABS. BASOPHILS 0.0 0.0 - 0.1 K/UL    ABS. IMM. GRANS. 0.0 K/UL    DF MANUAL      RBC COMMENTS NORMOCYTIC, NORMOCHROMIC     METABOLIC PANEL, COMPREHENSIVE    Collection Time: 21  9:19 AM   Result Value Ref Range    Sodium 141 136 - 145 mmol/L    Potassium 4.1 3.5 - 5.1 mmol/L    Chloride 109 (H) 97 - 108 mmol/L    CO2 27 21 - 32 mmol/L    Anion gap 5 5 - 15 mmol/L    Glucose 75 65 - 100 mg/dL    BUN 8 6 - 20 MG/DL    Creatinine 0.46 (L) 0.55 - 1.02 MG/DL    BUN/Creatinine ratio 17 12 - 20      GFR est AA >60 >60 ml/min/1.73m2    GFR est non-AA >60 >60 ml/min/1.73m2    Calcium 7.5 (L) 8.5 - 10.1 MG/DL    Bilirubin, total 0.3 0.2 - 1.0 MG/DL    ALT (SGPT) 16 12 - 78 U/L    AST (SGOT) 17 15 - 37 U/L    Alk. phosphatase 114 45 - 117 U/L    Protein, total 5.1 (L) 6.4 - 8.2 g/dL    Albumin 2.2 (L) 3.5 - 5.0 g/dL    Globulin 2.9 2.0 - 4.0 g/dL    A-G Ratio 0.8 (L) 1.1 - 2.2         Lab Results   Component Value Date/Time    HGB 8.3 (L) 2021 09:19 AM    Hgb, External 11.2 2020 12:00 AM       Assessment:   Post-Op , stable  SIPIH   POP 3  POP anemia, acute intraoperative blood loss, stable      Plan:     1. Routine post-operative care  2. Encouraged out of bed and ambulation  3. Oral pain medications  4. Advance diet  5. Continue postpartum and  teaching by nursing  6. Continue labetalol 200mg BID  7.  Repeat PI labs    Jun Dhillon MD    Recent Results (from the past 12 hour(s))   CBC WITH AUTOMATED DIFF    Collection Time: 21  9:19 AM   Result Value Ref Range    WBC 9.0 3.6 - 11.0 K/uL    RBC 2.78 (L) 3.80 - 5.20 M/uL    HGB 8.3 (L) 11.5 - 16.0 g/dL    HCT 25.4 (L) 35.0 - 47.0 %    MCV 91.4 80.0 - 99.0 FL    MCH 29.9 26.0 - 34.0 PG    MCHC 32.7 30.0 - 36.5 g/dL    RDW 14.3 11.5 - 14.5 %    PLATELET 144 030 - 775 K/uL    MPV 12.7 8.9 - 12.9 FL    NRBC 0.0 0  WBC    ABSOLUTE NRBC 0.00 0.00 - 0.01 K/uL    NEUTROPHILS 58 32 - 75 %    BAND NEUTROPHILS 5 0 - 6 %    LYMPHOCYTES 35 12 - 49 %    MONOCYTES 2 (L) 5 - 13 %    EOSINOPHILS 0 0 - 7 %    BASOPHILS 0 0 - 1 %    IMMATURE GRANULOCYTES 0 %    ABS. NEUTROPHILS 5.6 1.8 - 8.0 K/UL    ABS. LYMPHOCYTES 3.2 0.8 - 3.5 K/UL    ABS. MONOCYTES 0.2 0.0 - 1.0 K/UL    ABS. EOSINOPHILS 0.0 0.0 - 0.4 K/UL    ABS. BASOPHILS 0.0 0.0 - 0.1 K/UL    ABS. IMM. GRANS. 0.0 K/UL    DF MANUAL      RBC COMMENTS NORMOCYTIC, NORMOCHROMIC     METABOLIC PANEL, COMPREHENSIVE    Collection Time: 05/21/21  9:19 AM   Result Value Ref Range    Sodium 141 136 - 145 mmol/L    Potassium 4.1 3.5 - 5.1 mmol/L    Chloride 109 (H) 97 - 108 mmol/L    CO2 27 21 - 32 mmol/L    Anion gap 5 5 - 15 mmol/L    Glucose 75 65 - 100 mg/dL    BUN 8 6 - 20 MG/DL    Creatinine 0.46 (L) 0.55 - 1.02 MG/DL    BUN/Creatinine ratio 17 12 - 20      GFR est AA >60 >60 ml/min/1.73m2    GFR est non-AA >60 >60 ml/min/1.73m2    Calcium 7.5 (L) 8.5 - 10.1 MG/DL    Bilirubin, total 0.3 0.2 - 1.0 MG/DL    ALT (SGPT) 16 12 - 78 U/L    AST (SGOT) 17 15 - 37 U/L    Alk.  phosphatase 114 45 - 117 U/L    Protein, total 5.1 (L) 6.4 - 8.2 g/dL    Albumin 2.2 (L) 3.5 - 5.0 g/dL    Globulin 2.9 2.0 - 4.0 g/dL    A-G Ratio 0.8 (L) 1.1 - 2.2

## 2021-06-01 ENCOUNTER — OFFICE VISIT (OUTPATIENT)
Dept: OBGYN CLINIC | Age: 31
End: 2021-06-01
Payer: COMMERCIAL

## 2021-06-01 VITALS
HEART RATE: 66 BPM | WEIGHT: 190.6 LBS | BODY MASS INDEX: 31.75 KG/M2 | DIASTOLIC BLOOD PRESSURE: 92 MMHG | HEIGHT: 65 IN | SYSTOLIC BLOOD PRESSURE: 137 MMHG

## 2021-06-01 DIAGNOSIS — R31.9 HEMATURIA, UNSPECIFIED TYPE: Primary | ICD-10-CM

## 2021-06-01 DIAGNOSIS — T81.49XA WOUND INFECTION AFTER SURGERY: ICD-10-CM

## 2021-06-01 DIAGNOSIS — L76.82 PAIN AT SURGICAL INCISION: ICD-10-CM

## 2021-06-01 DIAGNOSIS — Z98.891 S/P C-SECTION: ICD-10-CM

## 2021-06-01 DIAGNOSIS — R30.0 DYSURIA: ICD-10-CM

## 2021-06-01 DIAGNOSIS — R80.9 PROTEINURIA, UNSPECIFIED TYPE: ICD-10-CM

## 2021-06-01 PROBLEM — Z34.00 PRENATAL CARE OF PRIMIGRAVIDA, ANTEPARTUM: Status: RESOLVED | Noted: 2020-11-06 | Resolved: 2021-06-01

## 2021-06-01 PROBLEM — I10 ESSENTIAL HYPERTENSION: Status: ACTIVE | Noted: 2021-06-01

## 2021-06-01 PROBLEM — O16.9 HYPERTENSION IN PREGNANCY: Status: RESOLVED | Noted: 2021-04-15 | Resolved: 2021-06-01

## 2021-06-01 PROBLEM — O10.019 PRE-EXISTING ESSENTIAL HYPERTENSION DURING PREGNANCY, ANTEPARTUM: Status: RESOLVED | Noted: 2020-12-17 | Resolved: 2021-06-01

## 2021-06-01 LAB
BILIRUB UR QL STRIP: NEGATIVE
GLUCOSE UR-MCNC: NEGATIVE MG/DL
KETONES P FAST UR STRIP-MCNC: NEGATIVE MG/DL
PH UR STRIP: 6 [PH] (ref 4.6–8)
PROT UR QL STRIP: NORMAL
SP GR UR STRIP: 1.02 (ref 1–1.03)
UA UROBILINOGEN AMB POC: NORMAL (ref 0.2–1)
URINALYSIS CLARITY POC: CLEAR
URINALYSIS COLOR POC: YELLOW
URINE BLOOD POC: NORMAL
URINE LEUKOCYTES POC: NEGATIVE
URINE NITRITES POC: NEGATIVE

## 2021-06-01 PROCEDURE — 99213 OFFICE O/P EST LOW 20 MIN: CPT | Performed by: OBSTETRICS & GYNECOLOGY

## 2021-06-01 PROCEDURE — 81002 URINALYSIS NONAUTO W/O SCOPE: CPT | Performed by: OBSTETRICS & GYNECOLOGY

## 2021-06-01 RX ORDER — LABETALOL 100 MG/1
200 TABLET, FILM COATED ORAL 2 TIMES DAILY
COMMUNITY
Start: 2021-05-30 | End: 2021-06-15

## 2021-06-01 RX ORDER — CEPHALEXIN 500 MG/1
500 CAPSULE ORAL 4 TIMES DAILY
Qty: 40 CAPSULE | Refills: 0 | Status: SHIPPED | OUTPATIENT
Start: 2021-06-01 | End: 2021-06-11

## 2021-06-01 RX ORDER — HYDROCODONE BITARTRATE AND ACETAMINOPHEN 5; 325 MG/1; MG/1
1 TABLET ORAL
Qty: 10 TABLET | Refills: 0 | Status: SHIPPED | OUTPATIENT
Start: 2021-06-01 | End: 2021-06-08
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
## 2021-06-02 ENCOUNTER — OFFICE VISIT (OUTPATIENT)
Dept: OBGYN CLINIC | Age: 31
End: 2021-06-02
Payer: COMMERCIAL

## 2021-06-02 VITALS
DIASTOLIC BLOOD PRESSURE: 90 MMHG | BODY MASS INDEX: 32.02 KG/M2 | WEIGHT: 192.4 LBS | SYSTOLIC BLOOD PRESSURE: 130 MMHG | TEMPERATURE: 99.1 F | HEART RATE: 74 BPM

## 2021-06-02 DIAGNOSIS — T14.8XXA WOUND INFECTION: ICD-10-CM

## 2021-06-02 DIAGNOSIS — I10 ESSENTIAL HYPERTENSION: Primary | ICD-10-CM

## 2021-06-02 DIAGNOSIS — L03.311 CELLULITIS OF ABDOMINAL WALL: ICD-10-CM

## 2021-06-02 DIAGNOSIS — L08.9 WOUND INFECTION: ICD-10-CM

## 2021-06-02 LAB
ALBUMIN SERPL-MCNC: 3.1 G/DL (ref 3.5–5)
ALBUMIN/GLOB SERPL: 0.9 {RATIO} (ref 1.1–2.2)
ALP SERPL-CCNC: 140 U/L (ref 45–117)
ALT SERPL-CCNC: 17 U/L (ref 12–78)
ANION GAP SERPL CALC-SCNC: 6 MMOL/L (ref 5–15)
AST SERPL-CCNC: 13 U/L (ref 15–37)
BASOPHILS # BLD: 0.1 K/UL (ref 0–0.1)
BASOPHILS NFR BLD: 1 % (ref 0–1)
BILIRUB SERPL-MCNC: 0.3 MG/DL (ref 0.2–1)
BUN SERPL-MCNC: 16 MG/DL (ref 6–20)
BUN/CREAT SERPL: 27 (ref 12–20)
CALCIUM SERPL-MCNC: 8.7 MG/DL (ref 8.5–10.1)
CHLORIDE SERPL-SCNC: 105 MMOL/L (ref 97–108)
CO2 SERPL-SCNC: 28 MMOL/L (ref 21–32)
CREAT SERPL-MCNC: 0.6 MG/DL (ref 0.55–1.02)
DIFFERENTIAL METHOD BLD: ABNORMAL
EOSINOPHIL # BLD: 0.4 K/UL (ref 0–0.4)
EOSINOPHIL NFR BLD: 4 % (ref 0–7)
ERYTHROCYTE [DISTWIDTH] IN BLOOD BY AUTOMATED COUNT: 13.7 % (ref 11.5–14.5)
GLOBULIN SER CALC-MCNC: 3.6 G/DL (ref 2–4)
GLUCOSE SERPL-MCNC: 77 MG/DL (ref 65–100)
HCT VFR BLD AUTO: 33.9 % (ref 35–47)
HGB BLD-MCNC: 10.2 G/DL (ref 11.5–16)
IMM GRANULOCYTES # BLD AUTO: 0.2 K/UL (ref 0–0.04)
IMM GRANULOCYTES NFR BLD AUTO: 2 % (ref 0–0.5)
LYMPHOCYTES # BLD: 1.8 K/UL (ref 0.8–3.5)
LYMPHOCYTES NFR BLD: 17 % (ref 12–49)
MCH RBC QN AUTO: 28.7 PG (ref 26–34)
MCHC RBC AUTO-ENTMCNC: 30.1 G/DL (ref 30–36.5)
MCV RBC AUTO: 95.2 FL (ref 80–99)
MONOCYTES # BLD: 0.7 K/UL (ref 0–1)
MONOCYTES NFR BLD: 7 % (ref 5–13)
NEUTS SEG # BLD: 7.1 K/UL (ref 1.8–8)
NEUTS SEG NFR BLD: 69 % (ref 32–75)
NRBC # BLD: 0 K/UL (ref 0–0.01)
NRBC BLD-RTO: 0 PER 100 WBC
PLATELET # BLD AUTO: 377 K/UL (ref 150–400)
PMV BLD AUTO: 11.8 FL (ref 8.9–12.9)
POTASSIUM SERPL-SCNC: 4.3 MMOL/L (ref 3.5–5.1)
PROT SERPL-MCNC: 6.7 G/DL (ref 6.4–8.2)
RBC # BLD AUTO: 3.56 M/UL (ref 3.8–5.2)
RBC MORPH BLD: ABNORMAL
SODIUM SERPL-SCNC: 139 MMOL/L (ref 136–145)
WBC # BLD AUTO: 10.3 K/UL (ref 3.6–11)

## 2021-06-02 PROCEDURE — 99212 OFFICE O/P EST SF 10 MIN: CPT | Performed by: OBSTETRICS & GYNECOLOGY
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
## 2021-06-03 ENCOUNTER — OFFICE VISIT (OUTPATIENT)
Dept: OBGYN CLINIC | Age: 31
End: 2021-06-03
Payer: COMMERCIAL

## 2021-06-03 VITALS
SYSTOLIC BLOOD PRESSURE: 138 MMHG | WEIGHT: 189.8 LBS | TEMPERATURE: 98 F | HEIGHT: 65 IN | DIASTOLIC BLOOD PRESSURE: 84 MMHG | BODY MASS INDEX: 31.62 KG/M2 | HEART RATE: 55 BPM

## 2021-06-03 DIAGNOSIS — T81.49XA WOUND INFECTION AFTER SURGERY: Primary | ICD-10-CM

## 2021-06-03 DIAGNOSIS — I10 ESSENTIAL HYPERTENSION: ICD-10-CM

## 2021-06-03 DIAGNOSIS — Z98.891 S/P C-SECTION: ICD-10-CM

## 2021-06-03 LAB
BACTERIA SPEC CULT: NORMAL
SERVICE CMNT-IMP: NORMAL

## 2021-06-03 PROCEDURE — 99212 OFFICE O/P EST SF 10 MIN: CPT | Performed by: OBSTETRICS & GYNECOLOGY
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
## 2021-06-04 ENCOUNTER — OFFICE VISIT (OUTPATIENT)
Dept: OBGYN CLINIC | Age: 31
End: 2021-06-04
Payer: COMMERCIAL

## 2021-06-04 ENCOUNTER — TELEPHONE (OUTPATIENT)
Dept: OBGYN CLINIC | Age: 31
End: 2021-06-04

## 2021-06-04 VITALS
SYSTOLIC BLOOD PRESSURE: 138 MMHG | WEIGHT: 191.4 LBS | DIASTOLIC BLOOD PRESSURE: 80 MMHG | HEIGHT: 65 IN | HEART RATE: 59 BPM | BODY MASS INDEX: 31.89 KG/M2

## 2021-06-04 DIAGNOSIS — I10 ESSENTIAL HYPERTENSION: Primary | ICD-10-CM

## 2021-06-04 PROCEDURE — 99212 OFFICE O/P EST SF 10 MIN: CPT | Performed by: OBSTETRICS & GYNECOLOGY

## 2021-06-04 NOTE — PATIENT INSTRUCTIONS
Section: What to Expect at [facility] Your Recovery A  section, or , is surgery to deliver your baby through a cut that the doctor makes in your lower belly and uterus. The cut is called an incision. You may have some pain in your lower belly and need pain medicine for 1 to 2 weeks. You can expect some vaginal bleeding for several weeks. You will probably need about 6 weeks to fully recover. It's important to take it easy while the incision heals. Avoid heavy lifting, strenuous activities, and exercises that strain the belly muscles while you recover. Ask a family member or friend for help with housework, cooking, and shopping. This care sheet gives you a general idea about how long it will take for you to recover. But each person recovers at a different pace. Follow the steps below to get better as quickly as possible. How can you care for yourself at home? Activity 
  · Rest when you feel tired. Getting enough sleep will help you recover.  
  · Try to walk each day. Start by walking a little more than you did the day before. Bit by bit, increase the amount you walk. Walking boosts blood flow and helps prevent pneumonia, constipation, and blood clots.  
  · Avoid strenuous activities, such as bicycle riding, jogging, weightlifting, and aerobic exercise, for 6 weeks or until your doctor says it is okay.  
  · Until your doctor says it is okay, do not lift anything heavier than your baby.  
  · Do not do sit-ups or other exercises that strain the belly muscles for 6 weeks or until your doctor says it is okay.  
  · Hold a pillow over your incision when you cough or take deep breaths. This will support your belly and decrease your pain.  
  · You may shower as usual. Pat the incision dry when you are done.  
  · You will have some vaginal bleeding. Wear sanitary pads.  Do not douche or use tampons until your doctor says it is okay.  
  · Ask your doctor when you can drive again.  
  · You will probably need to take at least 6 weeks off work. It depends on the type of work you do and how you feel.  
  · Ask your doctor when it is okay for you to have sex. Diet 
  · You can eat your normal diet. If your stomach is upset, try bland, low-fat foods like plain rice, broiled chicken, toast, and yogurt.  
  · Drink plenty of fluids (unless your doctor tells you not to).  
  · You may notice that your bowel movements are not regular right after your surgery. This is common. Try to avoid constipation and straining with bowel movements. You may want to take a fiber supplement every day. If you have not had a bowel movement after a couple of days, ask your doctor about taking a mild laxative.  
  · If you are breastfeeding, limit alcohol. Alcohol can cause a lack of energy and other health problems for the baby when a breastfeeding woman drinks heavily. It can also get in the way of a mom's ability to feed her baby or to care for the child in other ways. There isn't a lot of research about exactly how much alcohol can harm a baby. Having no alcohol is the safest choice for your baby. If you choose to have a drink now and then, have only one drink, and limit the number of occasions that you have a drink. Wait to breastfeed at least 2 hours after you have a drink to reduce the amount of alcohol the baby may get in the milk. Medicines 
  · Your doctor will tell you if and when you can restart your medicines. He or she will also give you instructions about taking any new medicines.  
  · If you take aspirin or some other blood thinner, ask your doctor if and when to start taking it again. Make sure that you understand exactly what your doctor wants you to do.  
  · Take pain medicines exactly as directed. ? If the doctor gave you a prescription medicine for pain, take it as prescribed.  
? If you are not taking a prescription pain medicine, ask your doctor if you can take an over-the-counter medicine.  
  · If you think your pain medicine is making you sick to your stomach: 
? Take your medicine after meals (unless your doctor has told you not to). ? Ask your doctor for a different pain medicine.  
  · If your doctor prescribed antibiotics, take them as directed. Do not stop taking them just because you feel better. You need to take the full course of antibiotics. Incision care 
  · If you have strips of tape on the incision, leave the tape on for a week or until it falls off.  
  · Wash the area daily with warm, soapy water, and pat it dry. Don't use hydrogen peroxide or alcohol, which can slow healing. You may cover the area with a gauze bandage if it weeps or rubs against clothing. Change the bandage every day.  
  · Keep the area clean and dry. Other instructions 
  · If you breastfeed your baby, you may be more comfortable while you are healing if you place the baby so that he or she is not resting on your belly. Try tucking your baby under your arm, with his or her body along the side you will be feeding on. Support your baby's upper body with your arm. With that hand you can control your baby's head to bring his or her mouth to your breast. This is sometimes called the football hold. Follow-up care is a key part of your treatment and safety. Be sure to make and go to all appointments, and call your doctor if you are having problems. It's also a good idea to know your test results and keep a list of the medicines you take. When should you call for help? Call  911 anytime you think you may need emergency care. For example, call if: 
  · You have thoughts of harming yourself, your baby, or another person.  
  · You passed out (lost consciousness).  
  · You have chest pain, are short of breath, or cough up blood.  
  · You have a seizure.   
Call your doctor now or seek immediate medical care if: 
  · You have pain that does not get better after you take pain medicine.  
  · You have severe vaginal bleeding.  
  · You are dizzy or lightheaded, or you feel like you may faint.  
  · You have new or worse pain in your belly or pelvis.  
  · You have loose stitches, or your incision comes open.  
  · You have symptoms of infection, such as: 
? Increased pain, swelling, warmth, or redness. ? Red streaks leading from the incision. ? Pus draining from the incision. ? A fever.  
  · You have symptoms of a blood clot in your leg (called a deep vein thrombosis), such as: 
? Pain in your calf, back of the knee, thigh, or groin. ? Redness and swelling in your leg or groin.  
  · You have signs of preeclampsia, such as: 
? Sudden swelling of your face, hands, or feet. ? New vision problems (such as dimness, blurring, or seeing spots). ? A severe headache. Watch closely for changes in your health, and be sure to contact your doctor if: 
  · You do not get better as expected. Where can you learn more? Go to http://www.elliott.com/ Enter M806 in the search box to learn more about \" Section: What to Expect at Home. \" Current as of: 2020               Content Version: 12.8  [employer]. Care instructions adapted under license by [employer] (which disclaims liability or warranty for this information). If you have questions about a medical condition or this instruction, always ask your healthcare professional. [place] [address] any warranty or liability for your use of this information.
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
## 2021-06-04 NOTE — TELEPHONE ENCOUNTER
Per Santos Álvarez: other option not accepted by her insurance.     Will try outpt wound care appt    [phone]    Thanks  TRP

## 2021-06-04 NOTE — PROGRESS NOTES
The results are normal.   Please notify patient if [employer] message not read or not active. Recommend f/u if still having symptoms/problems or has additional concerns.

## 2021-06-04 NOTE — TELEPHONE ENCOUNTER
I left a voicemail for the wound care to contact our office back to further discuss possible wound care for this pt.

## 2021-06-07 ENCOUNTER — OFFICE VISIT (OUTPATIENT)
Dept: OBGYN CLINIC | Age: 31
End: 2021-06-07
Payer: COMMERCIAL

## 2021-06-07 VITALS
BODY MASS INDEX: 31.52 KG/M2 | SYSTOLIC BLOOD PRESSURE: 127 MMHG | WEIGHT: 189.2 LBS | DIASTOLIC BLOOD PRESSURE: 84 MMHG | HEART RATE: 76 BPM | HEIGHT: 65 IN

## 2021-06-07 DIAGNOSIS — L08.9 WOUND INFECTION: Primary | ICD-10-CM

## 2021-06-07 DIAGNOSIS — T14.8XXA WOUND INFECTION: Primary | ICD-10-CM

## 2021-06-07 DIAGNOSIS — Z98.891 S/P C-SECTION: ICD-10-CM

## 2021-06-07 PROCEDURE — 99212 OFFICE O/P EST SF 10 MIN: CPT | Performed by: OBSTETRICS & GYNECOLOGY
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
## 2021-06-07 NOTE — PROGRESS NOTES
Written by Jaxon Cool MD on 6/3/2021 10:16 PM EDT  Seen by patient Joan Ibrahim on 6/3/2021 10:16 PM EDT

## 2021-06-10 ENCOUNTER — OFFICE VISIT (OUTPATIENT)
Dept: OBGYN CLINIC | Age: 31
End: 2021-06-10
Payer: COMMERCIAL

## 2021-06-10 VITALS
SYSTOLIC BLOOD PRESSURE: 114 MMHG | DIASTOLIC BLOOD PRESSURE: 73 MMHG | WEIGHT: 190.2 LBS | BODY MASS INDEX: 31.65 KG/M2 | HEART RATE: 63 BPM

## 2021-06-10 DIAGNOSIS — Z98.891 S/P C-SECTION: Primary | ICD-10-CM

## 2021-06-10 PROCEDURE — 99212 OFFICE O/P EST SF 10 MIN: CPT | Performed by: OBSTETRICS & GYNECOLOGY
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
## 2021-06-14 ENCOUNTER — TELEPHONE (OUTPATIENT)
Dept: OBGYN CLINIC | Age: 31
End: 2021-06-14

## 2021-06-14 NOTE — TELEPHONE ENCOUNTER
Called & spoke to [place] at [facility]. I canceled this pt's wound care appointment per Dr. Gonzalez Pean verbal orders.

## 2021-06-15 ENCOUNTER — TELEPHONE (OUTPATIENT)
Dept: OBGYN CLINIC | Age: 31
End: 2021-06-15

## 2021-06-15 RX ORDER — LABETALOL 100 MG/1
TABLET, FILM COATED ORAL
Qty: 60 TABLET | Refills: 0 | Status: SHIPPED | OUTPATIENT
Start: 2021-06-15 | End: 2021-06-30 | Stop reason: SDUPTHER

## 2021-06-15 NOTE — TELEPHONE ENCOUNTER
This nurse attempted to reach the patient and a detailed message was sent that her prescription refill has been sent to the pharmacy    My chart message sent to patient

## 2021-06-15 NOTE — PROGRESS NOTES
Written by Ajit Acevedo MD on 6/14/2021  9:40 PM EDT  Seen by patient Sofia Shin on 6/14/2021  9:58 PM EDT

## 2021-06-15 NOTE — TELEPHONE ENCOUNTER
27year old patient delivered on 2021 by     Patient last seen in the office on 6/10/2021 for wound check and has appointment on 2021    ?  Ok to refill pharmacy requested medication as pended    Please amend/sign    Thank  you

## 2021-06-17 ENCOUNTER — OFFICE VISIT (OUTPATIENT)
Dept: OBGYN CLINIC | Age: 31
End: 2021-06-17
Payer: COMMERCIAL

## 2021-06-17 VITALS
HEART RATE: 56 BPM | DIASTOLIC BLOOD PRESSURE: 85 MMHG | BODY MASS INDEX: 31.58 KG/M2 | WEIGHT: 189.8 LBS | SYSTOLIC BLOOD PRESSURE: 131 MMHG

## 2021-06-17 PROCEDURE — 99212 OFFICE O/P EST SF 10 MIN: CPT | Performed by: OBSTETRICS & GYNECOLOGY
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
## 2021-06-30 ENCOUNTER — TELEPHONE (OUTPATIENT)
Dept: OBGYN CLINIC | Age: 31
End: 2021-06-30

## 2021-06-30 RX ORDER — LABETALOL 100 MG/1
TABLET, FILM COATED ORAL
Qty: 60 TABLET | Refills: 0 | Status: SHIPPED | OUTPATIENT
Start: 2021-06-30 | End: 2021-07-02

## 2021-06-30 NOTE — TELEPHONE ENCOUNTER
Gave refills till recheck 5/2020   Patient is calling to say that she is running short of her Labetalol. She said she is on Labetalol 200 mg BID and she was just prescribed and used up the 100 mg bid.     She needs refill of Labetalol 200 mg bid      appt set for Friday 7/2/21 at 10:20 am

## 2021-07-01 RX ORDER — LABETALOL 200 MG/1
200 TABLET, FILM COATED ORAL 2 TIMES DAILY
Qty: 60 TABLET | Refills: 0 | Status: SHIPPED | OUTPATIENT
Start: 2021-07-01 | End: 2021-07-31

## 2021-07-02 ENCOUNTER — OFFICE VISIT (OUTPATIENT)
Dept: OBGYN CLINIC | Age: 31
End: 2021-07-02
Payer: COMMERCIAL

## 2021-07-02 VITALS
DIASTOLIC BLOOD PRESSURE: 93 MMHG | HEIGHT: 65 IN | SYSTOLIC BLOOD PRESSURE: 138 MMHG | HEART RATE: 77 BPM | WEIGHT: 191 LBS | BODY MASS INDEX: 31.82 KG/M2

## 2021-07-02 DIAGNOSIS — Z98.891 S/P C-SECTION: ICD-10-CM

## 2021-07-02 DIAGNOSIS — R03.0 ELEVATED BLOOD PRESSURE READING: ICD-10-CM

## 2021-07-02 PROCEDURE — 0502F SUBSEQUENT PRENATAL CARE: CPT | Performed by: OBSTETRICS & GYNECOLOGY

## 2021-07-02 RX ORDER — FLUTICASONE PROPIONATE 50 MCG
2 SPRAY, SUSPENSION (ML) NASAL DAILY
COMMUNITY
End: 2021-10-05
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
## 2021-07-02 NOTE — LETTER
7/2/2021 11:01 AM    Ms. Alfred Philip  [address] [facility] [id number]        Ms. Jett Dorsey is under my care. She may return to work on 07/11/2021. She is restricted to no heavy lifting over 25 pounds from 07/11/2021- 07/18/2021.          Sincerely,      Toño Alvarado MD

## 2021-10-05 ENCOUNTER — OFFICE VISIT (OUTPATIENT)
Dept: OBGYN CLINIC | Age: 31
End: 2021-10-05
Payer: COMMERCIAL

## 2021-10-05 VITALS
SYSTOLIC BLOOD PRESSURE: 130 MMHG | BODY MASS INDEX: 32.45 KG/M2 | DIASTOLIC BLOOD PRESSURE: 73 MMHG | WEIGHT: 195 LBS | HEART RATE: 67 BPM

## 2021-10-05 DIAGNOSIS — Z12.4 CERVICAL CANCER SCREENING: Primary | ICD-10-CM

## 2021-10-05 DIAGNOSIS — Z01.419 ENCOUNTER FOR WELL WOMAN EXAM WITH ROUTINE GYNECOLOGICAL EXAM: ICD-10-CM

## 2021-10-05 PROCEDURE — 99395 PREV VISIT EST AGE 18-39: CPT | Performed by: OBSTETRICS & GYNECOLOGY

## 2021-10-05 RX ORDER — LABETALOL 200 MG/1
TABLET, FILM COATED ORAL
COMMUNITY
Start: 2021-08-03

## 2021-10-05 NOTE — PATIENT INSTRUCTIONS
Well Visit, Ages 25 to 48: Care Instructions  Overview     Well visits can help you stay healthy. Your doctor has checked your overall health and may have suggested ways to take good care of yourself. Your doctor also may have recommended tests. At home, you can help prevent illness with healthy eating, regular exercise, and other steps. Follow-up care is a key part of your treatment and safety. Be sure to make and go to all appointments, and call your doctor if you are having problems. It's also a good idea to know your test results and keep a list of the medicines you take. How can you care for yourself at home? · Get screening tests that you and your doctor decide on. Screening helps find diseases before any symptoms appear. · Eat healthy foods. Choose fruits, vegetables, whole grains, protein, and low-fat dairy foods. Limit fat, especially saturated fat. Reduce salt in your diet. · Limit alcohol. If you are a man, have no more than 2 drinks a day or 14 drinks a week. If you are a woman, have no more than 1 drink a day or 7 drinks a week. · Get at least 30 minutes of physical activity on most days of the week. Walking is a good choice. You also may want to do other activities, such as running, swimming, cycling, or playing tennis or team sports. Discuss any changes in your exercise program with your doctor. · Reach and stay at a healthy weight. This will lower your risk for many problems, such as obesity, diabetes, heart disease, and high blood pressure. · Do not smoke or allow others to smoke around you. If you need help quitting, talk to your doctor about stop-smoking programs and medicines. These can increase your chances of quitting for good. · Care for your mental health. It is easy to get weighed down by worry and stress. Learn strategies to manage stress, like deep breathing and mindfulness, and stay connected with your family and community.  If you find you often feel sad or hopeless, talk with your doctor. Treatment can help. · Talk to your doctor about whether you have any risk factors for sexually transmitted infections (STIs). You can help prevent STIs if you wait to have sex with a new partner (or partners) until you've each been tested for STIs. It also helps if you use condoms (male or female condoms) and if you limit your sex partners to one person who only has sex with you. Vaccines are available for some STIs, such as HPV. · Use birth control if it's important to you to prevent pregnancy. Talk with your doctor about the choices available and what might be best for you. · If you think you may have a problem with alcohol or drug use, talk to your doctor. This includes prescription medicines (such as amphetamines and opioids) and illegal drugs (such as cocaine and methamphetamine). Your doctor can help you figure out what type of treatment is best for you. · Protect your skin from too much sun. When you're outdoors from 10 a.m. to 4 p.m., stay in the shade or cover up with clothing and a hat with a wide brim. Wear sunglasses that block UV rays. Even when it's cloudy, put broad-spectrum sunscreen (SPF 30 or higher) on any exposed skin. · See a dentist one or two times a year for checkups and to have your teeth cleaned. · Wear a seat belt in the car. When should you call for help? Watch closely for changes in your health, and be sure to contact your doctor if you have any problems or symptoms that concern you. Where can you learn more? Go to http://www.[employer].com/  Enter P072 in the search box to learn more about \"Well Visit, Ages 25 to 48: Care Instructions. \"  Current as of: February 11, 2021               Content Version: 13.0  © 2577-8563 [employer], Inc[employer]. Care instructions adapted under license by [employer] (which disclaims liability or warranty for this information).  If you have questions about a medical condition or this instruction, always ask your healthcare professional. [place] [address] any warranty or liability for your use of this information.
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
## 2021-10-09 LAB
CYTOLOGIST CVX/VAG CYTO: NORMAL
CYTOLOGY CVX/VAG DOC CYTO: NORMAL
CYTOLOGY CVX/VAG DOC THIN PREP: NORMAL
CYTOLOGY HISTORY:: NORMAL
DX ICD CODE: NORMAL
HPV I/H RISK 4 DNA CVX QL PROBE+SIG AMP: NEGATIVE
Lab: NORMAL
OTHER STN SPEC: NORMAL
STAT OF ADQ CVX/VAG CYTO-IMP: NORMAL

## 2021-10-12 NOTE — PROGRESS NOTES
Normal pap smear, message sent if [facility] active. Update PMH/: include: Date of pap, Cytology: wnl. For HR HPV results: list NEG or POS, when done.

## 2022-01-04 ENCOUNTER — OFFICE VISIT (OUTPATIENT)
Dept: URGENT CARE | Age: 32
End: 2022-01-04
Payer: COMMERCIAL

## 2022-01-04 VITALS — OXYGEN SATURATION: 99 % | HEART RATE: 80 BPM | TEMPERATURE: 99.2 F | RESPIRATION RATE: 18 BRPM

## 2022-01-04 DIAGNOSIS — Z20.822 SUSPECTED COVID-19 VIRUS INFECTION: Primary | ICD-10-CM

## 2022-01-04 PROCEDURE — 99202 OFFICE O/P NEW SF 15 MIN: CPT | Performed by: FAMILY MEDICINE
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
## 2022-01-06 LAB
SARS-COV-2, NAA 2 DAY TAT: NORMAL
SARS-COV-2, NAA: NOT DETECTED

## 2022-03-19 PROBLEM — I10 ESSENTIAL HYPERTENSION: Status: ACTIVE | Noted: 2021-06-01

## 2022-09-15 ENCOUNTER — OFFICE VISIT (OUTPATIENT)
Dept: URGENT CARE | Age: 32
End: 2022-09-15
Payer: COMMERCIAL

## 2022-09-15 VITALS
DIASTOLIC BLOOD PRESSURE: 88 MMHG | HEART RATE: 70 BPM | BODY MASS INDEX: 30.32 KG/M2 | SYSTOLIC BLOOD PRESSURE: 146 MMHG | RESPIRATION RATE: 16 BRPM | OXYGEN SATURATION: 98 % | WEIGHT: 182 LBS | TEMPERATURE: 99.8 F | HEIGHT: 65 IN

## 2022-09-15 DIAGNOSIS — B96.89 ACUTE BACTERIAL SINUSITIS: Primary | ICD-10-CM

## 2022-09-15 DIAGNOSIS — J01.90 ACUTE BACTERIAL SINUSITIS: Primary | ICD-10-CM

## 2022-09-15 LAB
HCG URINE, QL. (POC): NEGATIVE
VALID INTERNAL CONTROL?: YES

## 2022-09-15 PROCEDURE — 81025 URINE PREGNANCY TEST: CPT | Performed by: NURSE PRACTITIONER

## 2022-09-15 PROCEDURE — 99213 OFFICE O/P EST LOW 20 MIN: CPT | Performed by: NURSE PRACTITIONER

## 2022-09-15 RX ORDER — DOXYCYCLINE 100 MG/1
100 TABLET ORAL 2 TIMES DAILY
Qty: 14 TABLET | Refills: 0 | Status: SHIPPED | OUTPATIENT
Start: 2022-09-15 | End: 2022-09-22
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
## 2022-09-15 NOTE — LETTER
NOTIFICATION RETURN TO WORK / SCHOOL    9/15/2022 5:45 PM    Ms. Shorty Silva  [address] [facility] [address] [facility]  [address] [id number]      To Whom It May Concern:    Shorty Silva is currently under the care of [address] [place] [address]. Please excuse from work. She will return to work/school on: 09/16 OR 09/17/2022    If there are questions or concerns please have the patient contact our office.         Sincerely,      GHE PROVIDER

## 2022-11-15 ENCOUNTER — OFFICE VISIT (OUTPATIENT)
Dept: URGENT CARE | Age: 32
End: 2022-11-15
Payer: COMMERCIAL

## 2022-11-15 VITALS
TEMPERATURE: 99.7 F | HEART RATE: 95 BPM | OXYGEN SATURATION: 98 % | RESPIRATION RATE: 18 BRPM | SYSTOLIC BLOOD PRESSURE: 126 MMHG | DIASTOLIC BLOOD PRESSURE: 82 MMHG

## 2022-11-15 DIAGNOSIS — J02.9 SORE THROAT: ICD-10-CM

## 2022-11-15 DIAGNOSIS — Z11.59 SCREENING FOR VIRAL DISEASE: ICD-10-CM

## 2022-11-15 DIAGNOSIS — R68.89 FLU-LIKE SYMPTOMS: Primary | ICD-10-CM

## 2022-11-15 DIAGNOSIS — R05.1 ACUTE COUGH: ICD-10-CM

## 2022-11-15 LAB
S PYO AG THROAT QL: NEGATIVE
SARS-COV-2 PCR, POC: NEGATIVE
VALID INTERNAL CONTROL?: YES

## 2022-11-15 PROCEDURE — 3078F DIAST BP <80 MM HG: CPT | Performed by: FAMILY MEDICINE

## 2022-11-15 PROCEDURE — 99213 OFFICE O/P EST LOW 20 MIN: CPT | Performed by: FAMILY MEDICINE

## 2022-11-15 PROCEDURE — 87880 STREP A ASSAY W/OPTIC: CPT | Performed by: FAMILY MEDICINE

## 2022-11-15 PROCEDURE — 87635 SARS-COV-2 COVID-19 AMP PRB: CPT | Performed by: FAMILY MEDICINE

## 2022-11-15 PROCEDURE — 3074F SYST BP LT 130 MM HG: CPT | Performed by: FAMILY MEDICINE

## 2022-11-15 RX ORDER — OSELTAMIVIR PHOSPHATE 75 MG/1
75 CAPSULE ORAL 2 TIMES DAILY
Qty: 10 CAPSULE | Refills: 0 | Status: SHIPPED | OUTPATIENT
Start: 2022-11-15 | End: 2022-11-20

## 2022-11-15 NOTE — LETTER
NOTIFICATION TO RETURN TO WORK / SCHOOL    11/15/22     Ms. Alexus Hensley   [address] [phone]       To Whom It May Concern:    Alexus Hensley was seen today at [facility]. She  will return to work on 11/21/2022. If there are questions or concerns please have the patient contact our office.         Sincerely,      Ofelia Downs MD
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
## 2022-11-16 NOTE — PATIENT INSTRUCTIONS
OTC tylenol and/or ibuprofen as directed  Start Tamiflu  Increase fluids with electrolytes  ER if worse    Results for orders placed or performed in visit on 11/15/22   AMB POC RAPID STREP A   Result Value Ref Range    VALID INTERNAL CONTROL POC Yes     Group A Strep Ag Negative Negative   POCT COVID-19, SARS-COV-2, PCR   Result Value Ref Range    SARS-COV-2 PCR, POC Negative Negative

## 2023-01-18 ENCOUNTER — OFFICE VISIT (OUTPATIENT)
Dept: PRIMARY CARE CLINIC | Age: 33
End: 2023-01-18
Payer: COMMERCIAL

## 2023-01-18 VITALS
OXYGEN SATURATION: 98 % | BODY MASS INDEX: 30.92 KG/M2 | WEIGHT: 185.6 LBS | RESPIRATION RATE: 16 BRPM | HEIGHT: 65 IN | TEMPERATURE: 98.3 F | SYSTOLIC BLOOD PRESSURE: 119 MMHG | HEART RATE: 72 BPM | DIASTOLIC BLOOD PRESSURE: 80 MMHG

## 2023-01-18 DIAGNOSIS — J06.9 VIRAL URI: Primary | ICD-10-CM

## 2023-01-18 DIAGNOSIS — H69.82 EUSTACHIAN TUBE DYSFUNCTION, LEFT: ICD-10-CM

## 2023-01-18 PROCEDURE — 99203 OFFICE O/P NEW LOW 30 MIN: CPT

## 2023-01-18 RX ORDER — METHYLPREDNISOLONE 4 MG/1
TABLET ORAL
Qty: 1 DOSE PACK | Refills: 0 | Status: SHIPPED | OUTPATIENT
Start: 2023-01-18

## 2023-01-18 NOTE — PATIENT INSTRUCTIONS
- Do not take NSAIDS (motrin, aleve, naproxen etc.) while on steroids. Take steroids in the morning, will keep you up at night.    - Tylenol only for pain/discomfort.  - Nasacort as directed  - Benadryl 25mg at night
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
## 2023-01-18 NOTE — PROGRESS NOTES
Identified pt with two pt identifiers(name and ). Reviewed record in preparation for visit and have obtained necessary documentation. Chief Complaint   Patient presents with    Cold Symptoms     Started Monday; cough(green)/congestion/throat irritation/headache; no covid testing; taking coricidin hbp      Vitals:    23 1721   BP: 119/80   Pulse: 72   Resp: 16   Temp: 98.3 °F (36.8 °C)   TempSrc: Temporal   SpO2: 98%   Weight: 185 lb 9.6 oz (84.2 kg)   Height: 5' 5\" (1.651 m)   PainSc:   0 - No pain       Health Maintenance Review: Patient reminded of \"due or due soon\" health maintenance. I have asked the patient to contact his/her primary care provider (PCP) for follow-up on his/her health maintenance. Coordination of Care Questionnaire:  :   1) Have you been to an emergency room, urgent care, or hospitalized since your last visit? If yes, where when, and reason for visit? no       2. Have seen or consulted any other health care provider since your last visit? If yes, where when, and reason for visit? NO      Patient is accompanied by patient, daughter, and  I have received verbal consent from Bill Velazquez to discuss any/all medical information while they are present in the room.

## 2023-04-27 NOTE — PROGRESS NOTES
1520: assumed care of pt at this time in [place] format from TAMMY Patterson Rn    1540: pt in bed; no complaints at this time. 1544: Pt lying on right side; vital signs obtained by this nurse. 1603: Pt up to shower at this time. 1700: Pt moved from Room 216 to Room 211    1712: IV placed at this nurse; pt tolerated. Well. 1916: Bedside and Verbal shift change report given to TAMMY Muhammad rn (oncoming nurse) by PRASANTH May (offgoing nurse). Report included the following information SBAR, Intake/Output, MAR and Recent Results. There are no Wet Read(s) to document.

## 2023-05-28 ENCOUNTER — OFFICE VISIT (OUTPATIENT)
Age: 33
End: 2023-05-28

## 2023-05-28 VITALS
HEIGHT: 65 IN | SYSTOLIC BLOOD PRESSURE: 118 MMHG | TEMPERATURE: 97.9 F | OXYGEN SATURATION: 98 % | HEART RATE: 73 BPM | WEIGHT: 191 LBS | DIASTOLIC BLOOD PRESSURE: 76 MMHG | BODY MASS INDEX: 31.82 KG/M2 | RESPIRATION RATE: 16 BRPM

## 2023-05-28 DIAGNOSIS — J34.89 SINUS PRESSURE: Primary | ICD-10-CM

## 2023-05-28 RX ORDER — HYDROXYZINE HYDROCHLORIDE 10 MG/1
25 TABLET, FILM COATED ORAL 3 TIMES DAILY PRN
COMMUNITY

## 2023-05-28 RX ORDER — DOXYCYCLINE HYCLATE 100 MG
100 TABLET ORAL 2 TIMES DAILY
Qty: 14 TABLET | Refills: 0 | Status: SHIPPED | OUTPATIENT
Start: 2023-05-28 | End: 2023-06-04

## 2023-05-28 ASSESSMENT — ENCOUNTER SYMPTOMS
SINUS PRESSURE: 1
SORE THROAT: 0
SHORTNESS OF BREATH: 0
SINUS PAIN: 1
COUGH: 1
WHEEZING: 0

## 2023-12-05 ENCOUNTER — OFFICE VISIT (OUTPATIENT)
Age: 33
End: 2023-12-05
Payer: COMMERCIAL

## 2023-12-05 VITALS
WEIGHT: 191 LBS | HEART RATE: 56 BPM | SYSTOLIC BLOOD PRESSURE: 128 MMHG | RESPIRATION RATE: 16 BRPM | DIASTOLIC BLOOD PRESSURE: 80 MMHG | BODY MASS INDEX: 31.82 KG/M2 | OXYGEN SATURATION: 98 % | TEMPERATURE: 98.3 F | HEIGHT: 65 IN

## 2023-12-05 DIAGNOSIS — S05.02XA CORNEAL ABRASION, LEFT, INITIAL ENCOUNTER: Primary | ICD-10-CM

## 2023-12-05 DIAGNOSIS — H57.12 DISCOMFORT OF LEFT EYE: ICD-10-CM

## 2023-12-05 PROCEDURE — 99213 OFFICE O/P EST LOW 20 MIN: CPT

## 2023-12-05 PROCEDURE — 3074F SYST BP LT 130 MM HG: CPT

## 2023-12-05 RX ORDER — ERYTHROMYCIN 5 MG/G
OINTMENT OPHTHALMIC
Qty: 1 G | Refills: 0 | Status: SHIPPED | OUTPATIENT
Start: 2023-12-05

## 2023-12-05 ASSESSMENT — VISUAL ACUITY
OD_CC: 20/25
OS_CC: 20/20
OU: 1

## 2023-12-05 NOTE — PATIENT INSTRUCTIONS
-    Consider using Over the counter lubricating eye drops  -    Allergy medication daily (zyrtec, claritin, allegra) and small benadryl at night (if tolerable)  -    Frequent hand washing.  -    Discard any contact lenses or make up. Wear glasses during duration of antibiotic treatment. -    Follow up with opthalmologist/optometrist if symptoms worsens.

## 2024-01-20 ENCOUNTER — OFFICE VISIT (OUTPATIENT)
Age: 34
End: 2024-01-20

## 2024-01-20 VITALS
WEIGHT: 189.7 LBS | OXYGEN SATURATION: 99 % | HEIGHT: 66 IN | TEMPERATURE: 98.6 F | DIASTOLIC BLOOD PRESSURE: 73 MMHG | BODY MASS INDEX: 30.49 KG/M2 | HEART RATE: 73 BPM | SYSTOLIC BLOOD PRESSURE: 122 MMHG

## 2024-01-20 DIAGNOSIS — J06.9 UPPER RESPIRATORY TRACT INFECTION, UNSPECIFIED TYPE: Primary | ICD-10-CM

## 2024-01-20 LAB
INFLUENZA A ANTIGEN, POC: NEGATIVE
INFLUENZA B ANTIGEN, POC: NEGATIVE
Lab: NORMAL
QC PASS/FAIL: NORMAL
SARS-COV-2 RDRP RESP QL NAA+PROBE: NEGATIVE
STREP PYOGENES DNA, POC: NEGATIVE
VALID INTERNAL CONTROL, POC: NORMAL
VALID INTERNAL CONTROL, POC: NORMAL

## 2024-01-20 ASSESSMENT — ENCOUNTER SYMPTOMS
SHORTNESS OF BREATH: 0
CHEST TIGHTNESS: 0
COUGH: 1
RHINORRHEA: 1
SORE THROAT: 1

## 2024-01-20 NOTE — PROGRESS NOTES
Subjective:      The patient/guardian gave verbal consent to treat.        Chief Complaint   Patient presents with    URI     Cough, irritated through and congestion. Started yesterday       Neeta Magdaleno is a 33 y.o. female presenting to clinic today with symptoms that started 2 nights ago. Reports body aches, lethargy, cough, and itchy throat. Then reports developing congestion and rhinorrhea the next day. Endorses chest tightness and cough that has been productive of green sputum. Denies fevers or chills, nausea or vomiting, states she is tolerating PO as usual. Taking coricidin without any relief. Reports history of seasonal allergies, takes allergy medications every day.         History provided by:  Patient  History limited by: nothing.   used: No          Review of Systems   Constitutional:  Positive for fatigue. Negative for chills and fever.   HENT:  Positive for congestion, rhinorrhea and sore throat.    Respiratory:  Positive for cough. Negative for chest tightness and shortness of breath.    Cardiovascular:  Negative for chest pain.   Musculoskeletal:  Positive for myalgias.       Review of Systems - negative except as listed above    Reviewed PmHx, RxHx, FmHx, SocHx, AllgHx and updated in chart.  Family History   Problem Relation Age of Onset    Diabetes Paternal Grandfather     Heart Disease Maternal Grandmother     Down Syndrome Maternal Uncle     Down Syndrome Cousin     Psychiatric Disorder Maternal Grandmother     Cancer Maternal Grandmother         lung    Asthma Mother     High Cholesterol Mother     Hypertension Mother     Diabetes Mother     Psychiatric Disorder Mother     Psychiatric Disorder Father     Diabetes Father     Hypertension Father     Heart Disease Paternal Grandfather        Past Medical History:   Diagnosis Date    ADD (attention deficit disorder)     Auditory processing disorder     Breast lump in female     Endorses provider felt left breast lump in 2017,

## 2024-01-20 NOTE — PATIENT INSTRUCTIONS
Follow up with your primary care provider in 5-7 days if symptoms persist.  Go to the Emergency Department with development of any acute symptoms.

## 2024-07-15 ENCOUNTER — ROUTINE PRENATAL (OUTPATIENT)
Age: 34
End: 2024-07-15

## 2024-07-15 VITALS — SYSTOLIC BLOOD PRESSURE: 121 MMHG | BODY MASS INDEX: 29.99 KG/M2 | WEIGHT: 183 LBS | DIASTOLIC BLOOD PRESSURE: 76 MMHG

## 2024-07-15 DIAGNOSIS — Z82.79 FAMILY HISTORY OF CONGENITAL HEART DEFECT: ICD-10-CM

## 2024-07-15 DIAGNOSIS — Z34.90 PRENATAL CARE, ANTEPARTUM: ICD-10-CM

## 2024-07-15 DIAGNOSIS — O35.BXX0 FETAL CARDIAC ANOMALY, DELIVERED, CURRENT HOSPITALIZATION: ICD-10-CM

## 2024-07-15 DIAGNOSIS — Z11.51 ENCOUNTER FOR SCREENING FOR HUMAN PAPILLOMAVIRUS (HPV): ICD-10-CM

## 2024-07-15 DIAGNOSIS — I10 ESSENTIAL HYPERTENSION: ICD-10-CM

## 2024-07-15 DIAGNOSIS — Z11.3 VENEREAL DISEASE SCREENING: ICD-10-CM

## 2024-07-15 DIAGNOSIS — Z98.891 H/O: C-SECTION: ICD-10-CM

## 2024-07-15 DIAGNOSIS — Z81.8 FAMILY HISTORY OF AUTISM: ICD-10-CM

## 2024-07-15 DIAGNOSIS — O09.529 ANTEPARTUM MULTIGRAVIDA OF ADVANCED MATERNAL AGE: Primary | ICD-10-CM

## 2024-07-15 DIAGNOSIS — Z82.79 FAMILY HISTORY OF DOWN SYNDROME: ICD-10-CM

## 2024-07-15 DIAGNOSIS — Z12.4 CERVICAL CANCER SCREENING: ICD-10-CM

## 2024-07-15 LAB
ABO + RH BLD: NORMAL
ABO, EXTERNAL RESULT: NORMAL
BLOOD BANK CMNT PATIENT-IMP: NORMAL
BLOOD GROUP ANTIBODIES SERPL: NORMAL
C. TRACHOMATIS, EXTERNAL RESULT: NEGATIVE
HEP B, EXTERNAL RESULT: NEGATIVE
HIV, EXTERNAL RESULT: NONREACTIVE
N. GONORRHOEAE, EXTERNAL RESULT: NEGATIVE
RH FACTOR, EXTERNAL RESULT: POSITIVE
RPR, EXTERNAL RESULT: NONREACTIVE
RUBELLA TITER, EXTERNAL RESULT: NORMAL
SPECIMEN EXP DATE BLD: NORMAL

## 2024-07-15 PROCEDURE — 0501F PRENATAL FLOW SHEET: CPT | Performed by: OBSTETRICS & GYNECOLOGY

## 2024-07-15 RX ORDER — MULTIVIT-MIN/IRON/FOLIC ACID/K 18-600-40
CAPSULE ORAL
COMMUNITY

## 2024-07-15 NOTE — PROGRESS NOTES
Rooming note, New OB visit.        Current PCP: Lien Chacon APRN - [facility]      Neeta Magdaleno is a 34 y.o. female presents for a new pregnancy visit and to establish routine OB care.      Chief Complaint   Patient presents with    Pregnancy Ultrasound    Initial Prenatal Visit       Last Pap smear: see report obtained 3 year(s) ago and it was normal.    LMP history:  Patient's last menstrual period was 2024.  The date of LMP is  certain.    The patient's last menstrual period was Normal.  The patient's menstrual cycles are regular.    A urine pregnancy test was positive 3 weeks ago.   The patient was not on contraception at time of conception.     Based on the patient's LMP, her EGA is 7 weeks 6 days giving an EDC of 2025.     Ultrasound data:  The patient had an ultrasound done by the ultrasound tech today which revealed a viable [place] pregnancy with a gestational age of 8 weeks and 3 days with EDC 2025  TV ULTRASOUND PERFORMED UTERUS IS ANTEVERTED A SINGLE VI/ABLE 8W3D IUP IS SEEN WITH NORMAL CARDIAC RHYTHM. GESTATIONAL AGE BASED ON TODAY'S /ULTRASOUND. A NORMAL YOLK SAC IS SEEN. RIGHT ADNEXA APPEARS WITHIN NORMAL LIMITS. LEFT OVARY APPEARS WITHIN NORMAL LIMITS. NO FREE FLUID SEEN IN THE CDS.  Pregnancy symptoms:  The patient reports breast tenderness, heart burn, nausea  The patient denies vomiting    The patient has not experienced  vomiting over the last few weeks.  The patient has not experienced abnormal bleeding since the patient found out she was pregnant.    The patient states she has fluctuated between   182 -186    Relevant past pregnancy history:   The patient has the following pregnancy history: one previous c section; gestation hypertension    The patient does not have a history of  delivery (< 37 weeks).   The patient does have a history of a  section delivery.   The patient does not have a history of gestational diabetes, or pre-gestational diabetes.   The patient 
on BP, consider ~ 38 wks if stable    Physician review of ultrasound performed by technician  Today's ultrasound report and images were reviewed and discussed with the patient.  Please see images and imaging report entered by technician in PACS for more detail and progress note and diagnosis entered by MD.    HAROON AVILA MD    Orders Placed This Encounter    Culture, Urine     Standing Status:   Future     Number of Occurrences:   1     Standing Expiration Date:   7/12/2025    PAP IG, Ct-Ng-Tv,HPV Rfx HPV 16/18,45     Order Specific Question:   Pap Source?          (Required)     Answer:   CERVIX / ENDOCERVIX     Order Specific Question:   Pap collection method?          (Required)     Answer:   BRUSH-SPATULA     Order Specific Question:   Date of LMP?          (Required)     Answer:   5/23/2024    Hepatitis B Surface Antigen    HIV 1/2 Ag/Ab, 4TH Generation,W Rflx Confirm    CBC    Rubella antibody, IgG    RPR    Comprehensive Metabolic Panel     Standing Status:   Future     Number of Occurrences:   1     Standing Expiration Date:   7/15/2025    Protein / creatinine ratio, urine     Standing Status:   Future     Number of Occurrences:   1     Standing Expiration Date:   7/15/2025    Ambulatory referral to Maternal Fetal     Referral Priority:   Routine     Referral Type:   Eval and Treat     Referral Reason:   Specialty Services Required     Requested Specialty:   Perinatology     Number of Visits Requested:   1    Type and Screen    Cholecalciferol (VITAMIN D) 50 MCG (2000 UT) CAPS capsule     Sig: Take by mouth       Follow up : routine OB visit    On this date, 7/15/2024,  I have spent 45 minutes reviewing previous notes, test results and face to face with the patient discussing the diagnosis and importance of compliance with the treatment plan as well as documenting on the day of the visit.

## 2024-07-16 LAB
ALBUMIN SERPL-MCNC: 4 G/DL (ref 3.5–5)
ALBUMIN/GLOB SERPL: 1.4 (ref 1.1–2.2)
ALP SERPL-CCNC: 55 U/L (ref 45–117)
ALT SERPL-CCNC: 36 U/L (ref 12–78)
ANION GAP SERPL CALC-SCNC: 8 MMOL/L (ref 5–15)
AST SERPL-CCNC: 17 U/L (ref 15–37)
BACTERIA SPEC CULT: NORMAL
BILIRUB SERPL-MCNC: 0.4 MG/DL (ref 0.2–1)
BUN SERPL-MCNC: 12 MG/DL (ref 6–20)
BUN/CREAT SERPL: 26 (ref 12–20)
CALCIUM SERPL-MCNC: 8.9 MG/DL (ref 8.5–10.1)
CC UR VC: NORMAL
CHLORIDE SERPL-SCNC: 106 MMOL/L (ref 97–108)
CO2 SERPL-SCNC: 24 MMOL/L (ref 21–32)
CREAT SERPL-MCNC: 0.46 MG/DL (ref 0.55–1.02)
CREAT UR-MCNC: 58.7 MG/DL
ERYTHROCYTE [DISTWIDTH] IN BLOOD BY AUTOMATED COUNT: 13.3 % (ref 11.5–14.5)
GLOBULIN SER CALC-MCNC: 2.8 G/DL (ref 2–4)
GLUCOSE SERPL-MCNC: 82 MG/DL (ref 65–100)
HBV SURFACE AG SER QL: <0.1 INDEX
HBV SURFACE AG SER QL: NEGATIVE
HCT VFR BLD AUTO: 36.3 % (ref 35–47)
HGB BLD-MCNC: 11.7 G/DL (ref 11.5–16)
HIV 1+2 AB+HIV1 P24 AG SERPL QL IA: NONREACTIVE
HIV 1/2 RESULT COMMENT: NORMAL
MCH RBC QN AUTO: 29.8 PG (ref 26–34)
MCHC RBC AUTO-ENTMCNC: 32.2 G/DL (ref 30–36.5)
MCV RBC AUTO: 92.6 FL (ref 80–99)
NRBC # BLD: 0 K/UL (ref 0–0.01)
NRBC BLD-RTO: 0 PER 100 WBC
PLATELET # BLD AUTO: 199 K/UL (ref 150–400)
PMV BLD AUTO: 12.9 FL (ref 8.9–12.9)
POTASSIUM SERPL-SCNC: 4.2 MMOL/L (ref 3.5–5.1)
PROT SERPL-MCNC: 6.8 G/DL (ref 6.4–8.2)
PROT UR-MCNC: 8 MG/DL (ref 0–11.9)
PROT/CREAT UR-RTO: 0.1
RBC # BLD AUTO: 3.92 M/UL (ref 3.8–5.2)
RPR SER QL: NONREACTIVE
RUBV IGG SERPL IA-ACNC: NORMAL IU/ML
SERVICE CMNT-IMP: NORMAL
SODIUM SERPL-SCNC: 138 MMOL/L (ref 136–145)
WBC # BLD AUTO: 8.2 K/UL (ref 3.6–11)

## 2024-07-18 LAB
C TRACH RRNA CVX QL NAA+PROBE: NEGATIVE
CYTOLOGIST CVX/VAG CYTO: NORMAL
CYTOLOGY CVX/VAG DOC CYTO: NORMAL
CYTOLOGY CVX/VAG DOC THIN PREP: NORMAL
DX ICD CODE: NORMAL
HPV I/H RISK 4 DNA CVX QL PROBE+SIG AMP: NEGATIVE
Lab: NORMAL
N GONORRHOEA RRNA CVX QL NAA+PROBE: NEGATIVE
OTHER STN SPEC: NORMAL
STAT OF ADQ CVX/VAG CYTO-IMP: NORMAL
T VAGINALIS RRNA SPEC QL NAA+PROBE: NEGATIVE

## 2024-07-19 ENCOUNTER — TELEPHONE (OUTPATIENT)
Age: 34
End: 2024-07-19

## 2024-08-09 ENCOUNTER — ROUTINE PRENATAL (OUTPATIENT)
Age: 34
End: 2024-08-09

## 2024-08-09 VITALS
BODY MASS INDEX: 30.47 KG/M2 | DIASTOLIC BLOOD PRESSURE: 77 MMHG | SYSTOLIC BLOOD PRESSURE: 116 MMHG | HEIGHT: 66 IN | HEART RATE: 76 BPM | WEIGHT: 189.6 LBS

## 2024-08-09 DIAGNOSIS — Z3A.12 12 WEEKS GESTATION OF PREGNANCY: ICD-10-CM

## 2024-08-09 DIAGNOSIS — O09.529 ANTEPARTUM MULTIGRAVIDA OF ADVANCED MATERNAL AGE: Primary | ICD-10-CM

## 2024-08-09 NOTE — PROGRESS NOTES
[facility]  [phone]  Renee Santillan MD, FACOG  [email or web address]      Routine follow-up OB visit     Chief Complaint   Patient presents with    Routine Prenatal Visit       Patient Active Problem List    Diagnosis Date Noted    Antepartum multigravida of advanced maternal age 07/15/2024     section wound seroma, postpartum 06/10/2021    Essential hypertension 2021    Sepsis (HCC) 2016    Depression 2014    ADD (attention deficit disorder) 2014       SUBJECTIVE:  Pt reports doing well, feels normal.     OBJECTIVE:  Vitals:    24 0838   BP: 116/77   Pulse:      See prenatal flowsheet    Physical Exam:  Gen: no acute distress, normal speech  Cardiac: appears warm and well perfused  Pulm: breathing comfortably on room air  Abd: soft, gravid, non-tender  Fundal height: see prenatal flowsheet  Ext: symmetric; moves all 4 extremities equally    Assessment:  34 y.o.  12w0d   Routine prenatal visit  Encounter Diagnoses   Name Primary?    Antepartum multigravida of advanced maternal age Yes    12 weeks gestation of pregnancy        Plan:   Routine follow up OB appointment  Continue routine prenatal care   Routine OB instructions given appropriate to gestational age, see AVS  Early glucola  NIPS  Rec BP log    Orders Placed This Encounter   Procedures    Panorama Prenatal Test       ==========Department Information==========  ID: [id number]  Department:[facility]  [facility]  [address] [address] [address]  Dept: [phone]  Dept Fax: [phone]     Order Specific Question:   What type of billing?     Answer:   [employer]     Order Specific Question:   Expected due date:     Answer:   2025     Order Specific Question:   Maternal weight (lbs):     Answer:   189     Order Specific Question:   Which Microdeletion Panel is

## 2024-08-13 ENCOUNTER — PATIENT MESSAGE (OUTPATIENT)
Age: 34
End: 2024-08-13

## 2024-08-13 ENCOUNTER — TELEPHONE (OUTPATIENT)
Age: 34
End: 2024-08-13

## 2024-08-13 RX ORDER — BENZONATATE 100 MG/1
100 CAPSULE ORAL 3 TIMES DAILY PRN
Qty: 20 CAPSULE | Refills: 0 | Status: SHIPPED | OUTPATIENT
Start: 2024-08-13 | End: 2024-08-20

## 2024-08-13 RX ORDER — BENZONATATE 100 MG/1
100 CAPSULE ORAL 3 TIMES DAILY PRN
Qty: 20 CAPSULE | Refills: 0 | Status: SHIPPED | OUTPATIENT
Start: 2024-08-13 | End: 2024-08-13 | Stop reason: SDUPTHER

## 2024-08-13 NOTE — TELEPHONE ENCOUNTER
PT calling back, returning call vm left, MD message relayed to PT:  Vicky Tapia MD         8/13/24 12:17 PM  Note     OK to take Paxlovid during pregnancy. (My understanding is that coverage is now more challenging).     eRX sent for Tessalon Perles for cough.  OK to take during pregnancy.     If she is having significant respiratory sx, then she should go to ER for further eval.          PT is asking if the Rx for Tessalon Perles can be re-routed to a different pharmacy, [facility] in [place].  Per MD verbal order, active Rx sent by MD, re routed to preferred pharmacy for pickup.  PT states she did not get the answer to her question-she is asking if the Paxlovid is safe with genetic HTN, as she is high risk due to this.    Please advise on medication/BP    Thank you

## 2024-08-13 NOTE — TELEPHONE ENCOUNTER
Two patient identifiers used         34 year old patient  12 w4d pregnant.    Patient calling to say that she woke up at 3am with nasal congestion, severe cough that hurt on the right side her her chest and hurt to breath. Patient reports her daughter tested positive for covid  this past Friday .    Patient contacted her PCP who told her to take a covid test and it came back positive .    Patient calling to fined out what she can take to help her symptoms.    Approved otc medication provided to patient.    Patient was advised she can take tylenol to keep and fever under control and increase po fluids.      Patient is wondering if her PcP can prescribe her Paxlvoid in pregnancy and she also has high BP and is on labetalol (NORMODYNE) 200 MG table         Please advise    Thank you

## 2024-08-13 NOTE — TELEPHONE ENCOUNTER
OK to take Paxlovid during pregnancy. (My understanding is that coverage is now more challenging).    eRX sent for Tessalon Perles for cough.  OK to take during pregnancy.    If she is having significant respiratory sx, then she should go to ER for further eval.

## 2024-08-13 NOTE — TELEPHONE ENCOUNTER
Vicky Tapia MD Physician Signed 12:14 PM       OK to take Paxlovid during pregnancy. (My understanding is that coverage is now more challenging).     eRX sent for Tessalon Perles for cough.  OK to take during pregnancy.     If she is having significant respiratory sx, then she should go to ER for further eval.          This nurse attempted to reach the patient and left a detailed message on identifiable message regarding work in Dr Regina CRAFT recommendations ,sent prescription and to call the office if needing clarifications/questions

## 2024-08-13 NOTE — TELEPHONE ENCOUNTER
PT call returned name and  verified     MD message relayed:  Vicky Tapia MD         24  2:20 PM  Note     Should be fine to take Paxlovid in the setting of CHTN on labetalol.          PT verbalizes understanding.

## 2024-08-20 ENCOUNTER — ROUTINE PRENATAL (OUTPATIENT)
Age: 34
End: 2024-08-20
Payer: COMMERCIAL

## 2024-08-20 ENCOUNTER — LAB (OUTPATIENT)
Age: 34
End: 2024-08-20

## 2024-08-20 VITALS — SYSTOLIC BLOOD PRESSURE: 122 MMHG | OXYGEN SATURATION: 98 % | HEART RATE: 58 BPM | DIASTOLIC BLOOD PRESSURE: 79 MMHG

## 2024-08-20 DIAGNOSIS — O09.529 ANTEPARTUM MULTIGRAVIDA OF ADVANCED MATERNAL AGE: Primary | ICD-10-CM

## 2024-08-20 DIAGNOSIS — I10 ESSENTIAL HYPERTENSION: ICD-10-CM

## 2024-08-20 DIAGNOSIS — Z3A.13 13 WEEKS GESTATION OF PREGNANCY: Primary | ICD-10-CM

## 2024-08-20 PROCEDURE — 3078F DIAST BP <80 MM HG: CPT | Performed by: OBSTETRICS & GYNECOLOGY

## 2024-08-20 PROCEDURE — 76813 OB US NUCHAL MEAS 1 GEST: CPT | Performed by: OBSTETRICS & GYNECOLOGY

## 2024-08-20 PROCEDURE — 3074F SYST BP LT 130 MM HG: CPT | Performed by: OBSTETRICS & GYNECOLOGY

## 2024-08-20 PROCEDURE — 99214 OFFICE O/P EST MOD 30 MIN: CPT | Performed by: OBSTETRICS & GYNECOLOGY

## 2024-08-20 NOTE — PROGRESS NOTES
Chief Complaint   Patient presents with    New Patient    Pregnancy Ultrasound        /79 (Site: Left Upper Arm, Position: Sitting, Cuff Size: Medium Adult)   Pulse 58   LMP 05/21/2024   SpO2 98%     Pain Scale: 0 - No pain/10  Pain Location:      Current Outpatient Medications on File Prior to Visit   Medication Sig Dispense Refill    Cholecalciferol (VITAMIN D) 50 MCG (2000 UT) CAPS capsule Take by mouth      Prenatal Vit-Fe Fumarate-FA (PRENATAL VITAMIN PO) Take 1 tablet by mouth daily      hydrOXYzine HCl (ATARAX) 10 MG tablet Take 2.5 tablets by mouth 3 times daily as needed for Anxiety      buPROPion (WELLBUTRIN XL) 150 MG extended release tablet Take 1 tablet by mouth      labetalol (NORMODYNE) 200 MG tablet ceived the following from [facility]: Outside name: labetaloL (NORMODYNE) 200 mg tablet      benzonatate (TESSALON) 100 MG capsule Take 1 capsule by mouth 3 times daily as needed for Cough 20 capsule 0     No current facility-administered medications on file prior to visit.       \"Have you been to the ER, urgent care clinic since your last visit?  Hospitalized since your last visit?\"    NO    “Have you seen or consulted any other health care providers outside of [facility] since your last visit?”    NO

## 2024-08-21 ENCOUNTER — TELEPHONE (OUTPATIENT)
Age: 34
End: 2024-08-21

## 2024-08-21 LAB
CREAT 24H UR-MRATE: 1339 MG/24 HR (ref 800–1800)
CREAT UR-MCNC: 103 MG/DL
PROT 24H UR-MRATE: 248 MG/24 HR (ref 30–150)
PROT UR-MCNC: 19.1 MG/DL

## 2024-08-23 NOTE — PROCEDURES
PATIENT: ABUNDIO CONTRERAS   -  : 1990   -  DOS:2024   -  INTERPRETING PROVIDER:Ana Bella,   Indication  ========    AMA, CHTN, History of  labor    Method  ======    Transabdominal ultrasound examination. View: Sufficient    Pregnancy  =========    Degroot pregnancy. Number of fetuses: 1    Dating  ======    LMP on: 2024  GA by LMP 13 w + 0 d  CHARAN by LMP: 2025  Previous Ultrasound on: 7/15/2024  Type of prior assessment: GA  GA at prior assessment date 8 w + 3 d  GA by previous U/S 13 w + 4 d  CHARAN by previous Ultrasound: 2025  Ultrasound examination on: 2024  GA by U/S based upon: CRL  GA by U/S 14 w + 0 d  CHARAN by U/S: 2025  Assigned: based on ultrasound (GA), selected on 2024  Assigned GA 13 w + 4 d  Assigned CHARAN: 2025    General Evaluation  ==============    Cardiac activity present  Placenta: Anterior  Cord vessels: 3 vessel cord  Amniotic fluid: normal amount    Fetal Biometry  ============    Standard   bpm  59% Nicolaides  CRL 80.2 mm 14w 0d 75% Hadlock  NT 2.10 mm  Extended  Nasal bone 3.1 mm  Nasal bone: present    Fetal Anatomy  ===========    The following structures appear normal:  Stomach. Bladder.    The following structures were visualized:  Cranium: Midline falx  Choroid plexus. Face: Profile. Abdominal wall: Intact. Kidneys. Arms. Legs.    Maternal Structures  ===============    Ovaries / Tubes / Adnexa  Rt ovary: Visualized  Rt ovary D1 3.1 cm  Rt ovary D2 2.6 cm  Rt ovary D3 1.7 cm  Rt ovary Vol 7.0 cm³  Rt ovarian cyst(s): Cysts identified  Rt ovarian cyst D1 2.8 cm  Rt ovarian cyst D2 1.4 cm  Rt ovarian cyst D3 1.5 cm  Rt ovarian cyst mean 1.90 cm  Rt ovarian cyst vol 3.079 cm³  Lt ovary: Not visualized    Findings  =======    Living Degroot pregnancy at 13w 4d by clinical dates.  NT measures 2.1 mm.  Anatomy visualized as stated above.  Placental site is Anterior.    Consultation  ==========    Ms. Contreras is a 34-yo

## 2024-08-23 NOTE — TELEPHONE ENCOUNTER
Patient dropped off 24 hour urine yesterday morning with the dates 8/4/2024-8/5/2024.   I called the patient to verify that those were the correct dates. She confirmed and said that she was diagnosed with COVID and was unable to drop off the urine.   Patient reports she kept the sample refrigerated the whole time.     Sample was sent to [facility] - patient advised she may need to repeat collection. Patient verbalized understanding and voiced if repeat is needed she would like to  supplies during appt on 9/6

## 2024-08-24 PROBLEM — O09.529 ANTEPARTUM MULTIGRAVIDA OF ADVANCED MATERNAL AGE: Chronic | Status: ACTIVE | Noted: 2024-07-15

## 2024-08-24 NOTE — RESULT ENCOUNTER NOTE
Pt s/p MFM US and consultation.  JESSEM report reviewed by Renee Santillan MD  Rec nephrology consult for proteinuria.

## 2024-09-06 ENCOUNTER — ROUTINE PRENATAL (OUTPATIENT)
Age: 34
End: 2024-09-06

## 2024-09-06 VITALS — SYSTOLIC BLOOD PRESSURE: 118 MMHG | DIASTOLIC BLOOD PRESSURE: 75 MMHG | WEIGHT: 189 LBS | BODY MASS INDEX: 30.97 KG/M2

## 2024-09-06 DIAGNOSIS — O09.529 ANTEPARTUM MULTIGRAVIDA OF ADVANCED MATERNAL AGE: Chronic | ICD-10-CM

## 2024-09-06 DIAGNOSIS — Z3A.16 16 WEEKS GESTATION OF PREGNANCY: Primary | ICD-10-CM

## 2024-09-06 PROCEDURE — 0502F SUBSEQUENT PRENATAL CARE: CPT | Performed by: OBSTETRICS & GYNECOLOGY

## 2024-09-06 NOTE — PROGRESS NOTES
[facility]  [phone]  Renee Santillan MD, FACOG  [email or web address]      Routine follow-up OB visit     Chief Complaint   Patient presents with    Routine Prenatal Visit       Patient Active Problem List    Diagnosis Date Noted    Antepartum multigravida of advanced maternal age 07/15/2024     section wound seroma, postpartum 06/10/2021    Essential hypertension 2021    Sepsis (HCC) 2016    Depression 2014    ADD (attention deficit disorder) 2014       SUBJECTIVE:  Pt reports doing well. +FM     OBJECTIVE:  Vitals:    24 0846   BP: 118/75     See prenatal flowsheet    Physical Exam:  Gen: no acute distress, normal speech  Cardiac: appears warm and well perfused  Pulm: breathing comfortably on room air  Abd: soft, gravid, non-tender  Fundal height: see prenatal flowsheet  Ext: symmetric; moves all 4 extremities equally    Assessment:  34 y.o.  16w0d   Routine prenatal visit  Encounter Diagnoses   Name Primary?    16 weeks gestation of pregnancy Yes    Antepartum multigravida of advanced maternal age        Plan:   Routine follow up OB appointment  Continue routine prenatal care   Routine OB instructions given appropriate to gestational age, see AVS    Orders Placed This Encounter   Procedures    Glucose Tolerance Gestational, 1 Hour     Standing Status:   Future     Standing Expiration Date:   2025    Alpha Fetoprotein, Maternal     Standing Status:   Future     Standing Expiration Date:   2025     Order Specific Question:   Is this patient insulin dependent?          (Required)     Answer:   No     Order Specific Question:   Patient Weight  (lbs)          (Required  - Whole Number)     Answer:   189     Order Specific Question:   Gestational Age (weeks)?          (Required - Whole Number)     Answer:   16     Order Specific Question:   Gestational Age Calculation?    (Required)

## 2024-09-07 LAB — GLUCOSE 1H P 50 G GLC PO SERPL-MCNC: 72 MG/DL (ref 70–139)

## 2024-09-10 ENCOUNTER — TELEPHONE (OUTPATIENT)
Age: 34
End: 2024-09-10

## 2024-09-11 LAB
AFP INTERP SERPL-IMP: NORMAL
AFP INTERP SERPL-IMP: NORMAL
AFP MOM SERPL: 1.08
AFP SERPL-MCNC: 30.8 NG/ML
AGE AT DELIVERY: 36 YR
COMMENT: NORMAL
GA METHOD: NORMAL
GA: 16 WEEKS
IDDM PATIENT QL: NO
Lab: NORMAL
MULTIPLE PREGNANCY: NO
NEURAL TUBE DEFECT RISK FETUS: 9540 %

## 2024-09-25 NOTE — TELEPHONE ENCOUNTER
Referral placed for home health yesterday    [id number] called to advise us that they cannot take on this patient at this time due to limited staff. Negative

## 2024-10-04 ENCOUNTER — ROUTINE PRENATAL (OUTPATIENT)
Age: 34
End: 2024-10-04

## 2024-10-04 VITALS — BODY MASS INDEX: 31.53 KG/M2 | WEIGHT: 192.4 LBS | DIASTOLIC BLOOD PRESSURE: 76 MMHG | SYSTOLIC BLOOD PRESSURE: 116 MMHG

## 2024-10-04 DIAGNOSIS — O09.529 ANTEPARTUM MULTIGRAVIDA OF ADVANCED MATERNAL AGE: Chronic | ICD-10-CM

## 2024-10-04 DIAGNOSIS — Z3A.19 19 WEEKS GESTATION OF PREGNANCY: Primary | ICD-10-CM

## 2024-10-04 SDOH — ECONOMIC STABILITY: FOOD INSECURITY: WITHIN THE PAST 12 MONTHS, YOU WORRIED THAT YOUR FOOD WOULD RUN OUT BEFORE YOU GOT MONEY TO BUY MORE.: NEVER TRUE

## 2024-10-04 SDOH — ECONOMIC STABILITY: INCOME INSECURITY: HOW HARD IS IT FOR YOU TO PAY FOR THE VERY BASICS LIKE FOOD, HOUSING, MEDICAL CARE, AND HEATING?: NOT HARD AT ALL

## 2024-10-04 SDOH — ECONOMIC STABILITY: FOOD INSECURITY: WITHIN THE PAST 12 MONTHS, THE FOOD YOU BOUGHT JUST DIDN'T LAST AND YOU DIDN'T HAVE MONEY TO GET MORE.: NEVER TRUE

## 2024-10-04 SDOH — ECONOMIC STABILITY: TRANSPORTATION INSECURITY
IN THE PAST 12 MONTHS, HAS LACK OF TRANSPORTATION KEPT YOU FROM MEETINGS, WORK, OR FROM GETTING THINGS NEEDED FOR DAILY LIVING?: NO

## 2024-10-04 NOTE — PROGRESS NOTES
INJECTION NOTE  ~~~~~~~~~~~~~~~~~~~~~      Administered flu vaccine per orders of Renee Santillan MD . Verbal consent received by patient & injection given. Patient tolerated well, no complications and no side effects. Encouraged her to remain in clinic for 15 minutes and immediately report any adverse reactions. Patient informed of next injection date ranges and encouraged to schedule. She verbalized understanding and expressed intent to comply.

## 2024-10-04 NOTE — PROGRESS NOTES
[facility]  [phone]  Renee Santillan MD, FACOG  [email or web address]      Routine follow-up OB visit     Chief Complaint   Patient presents with    Routine Prenatal Visit     Flu Vaccine       Patient Active Problem List    Diagnosis Date Noted    Antepartum multigravida of advanced maternal age 07/15/2024     section wound seroma, postpartum 06/10/2021    Essential hypertension 2021    Sepsis (HCC) 2016    Depression 2014    ADD (attention deficit disorder) 2014       SUBJECTIVE:  Pt reports doing well     OBJECTIVE:  Vitals:    10/04/24 0901   BP: 116/76     See prenatal flowsheet    Physical Exam:  Gen: no acute distress, normal speech  Cardiac: appears warm and well perfused  Pulm: breathing comfortably on room air  Abd: soft, gravid, non-tender  Fundal height: see prenatal flowsheet  Ext: symmetric; moves all 4 extremities equally    Assessment:  34 y.o.  20w0d   Routine prenatal visit  Encounter Diagnoses   Name Primary?    19 weeks gestation of pregnancy Yes    Antepartum multigravida of advanced maternal age        Plan:   Routine follow up OB appointment  Continue routine prenatal care   Routine OB instructions given appropriate to gestational age, see AVS  Disc CS timing 38 wks    Orders Placed This Encounter   Procedures    Influenza, FLUCELVAX Trivalent, (age 6 mo+) IM, Preservative Free, 0.5mL        No follow-ups on file.      On this date of service, I have spent greater than 10 minutes reviewing the patient's previous notes, test results and planned follow up as well as performing documentation related to this visit.  More than 50% of this time was spent face to face with the patient discussing the plan of care, diagnosis and importance of compliance with the prenatal care and physician recommendations as well as answering any questions.     [] SAB/bleeding precautions reviewed   []

## 2024-10-11 ENCOUNTER — ROUTINE PRENATAL (OUTPATIENT)
Age: 34
End: 2024-10-11

## 2024-10-11 VITALS — DIASTOLIC BLOOD PRESSURE: 72 MMHG | SYSTOLIC BLOOD PRESSURE: 117 MMHG | HEART RATE: 76 BPM

## 2024-10-11 DIAGNOSIS — Z3A.21 21 WEEKS GESTATION OF PREGNANCY: Primary | ICD-10-CM

## 2024-10-11 NOTE — PROCEDURES
PATIENT: ABUNDIO CONTRERAS   -  : 1990   -  DOS:10/11/2024   -  INTERPRETING PROVIDER:Ana Bella,   Indication  ========    Anatomy, Prior     Method  ======    Transabdominal and transvaginal ultrasound examination. View: Suboptimal view: limited by fetal position    Dating  ======    LMP on: 2024  GA by LMP 20 w + 3 d  CHARAN by LMP: 2025  Previous Ultrasound on: 7/15/2024  Type of prior assessment: GA  GA at prior assessment date 8 w + 3 d  GA by previous U/S 21 w + 0 d  CHARAN by previous Ultrasound: 2025  Ultrasound examination on: 10/11/2024  GA by U/S based upon: AC, BPD, Femur, HC  GA by U/S 22 w + 0 d  CHARAN by U/S: 2025  Assigned: based on ultrasound (GA), selected on 2024  Assigned GA 21 w + 0 d  Assigned CHARAN: 2025    Fetal Growth Overview  =================    Exam date        GA              BPD (mm)          HC (mm)           AC (mm)            FL (mm)             HL (mm)             EFW (g)  10/11/2024        21w 0d        51.6     74%        186    37%        176     87%        38.9    87%        35.2     82%        493    96%    Fetal Biometry  ============    Standard  BPD 51.6 mm 21w 5d 74% Hadlock  OFD 64.8 mm 22w 0d 81% Emy  .0 mm 21w 0d 37% Hadlock  Cerebellum tr 21.0 mm 20w 0d 25% Hill  Nuchal fold 3.2 mm  .0 mm 22w 4d 87% Hadlock  Femur 38.9 mm 22w 3d 87% Hadlock  Humerus 35.2 mm 22w 1d 82% Emy   g 22w 1d 96% Hadlock  EFW (lb) 1 lb  EFW (oz) 1 oz  EFW by: Hadlock (BPD-HC-AC-FL)  Extended   6.5 mm  CM 5.7 mm  65% Nicolaides  Nasal bone 7.1 mm  Head / Face / Neck  Nasal bone: present  Other Structures   bpm    General Evaluation  ==============    Cardiac activity present.  bpm. Fetal movements: visualized. Presentation: Cephalic  Placenta: Placental site: anterior, appropriate distance from the internal os  Umbilical cord: Cord vessels: 3 vessel cord. Insertion site: central  Amniotic fluid: Amount of AF:

## 2024-11-01 ENCOUNTER — ROUTINE PRENATAL (OUTPATIENT)
Age: 34
End: 2024-11-01

## 2024-11-01 VITALS
WEIGHT: 193.6 LBS | HEART RATE: 74 BPM | HEIGHT: 66 IN | OXYGEN SATURATION: 99 % | DIASTOLIC BLOOD PRESSURE: 76 MMHG | BODY MASS INDEX: 31.12 KG/M2 | SYSTOLIC BLOOD PRESSURE: 116 MMHG

## 2024-11-01 DIAGNOSIS — Z3A.24 24 WEEKS GESTATION OF PREGNANCY: Primary | ICD-10-CM

## 2024-11-01 DIAGNOSIS — Z98.891 H/O: C-SECTION: ICD-10-CM

## 2024-11-01 DIAGNOSIS — O09.529 ANTEPARTUM MULTIGRAVIDA OF ADVANCED MATERNAL AGE: Chronic | ICD-10-CM

## 2024-11-01 NOTE — PROGRESS NOTES
[facility]  [phone]  Renee Santillan MD, FACOG  [email or web address]      Routine follow-up OB visit     Chief Complaint   Patient presents with    Routine Prenatal Visit       Patient Active Problem List    Diagnosis Date Noted    Antepartum multigravida of advanced maternal age 07/15/2024     section wound seroma, postpartum 06/10/2021    Essential hypertension 2021    Sepsis (HCC) 2016    Depression 2014    ADD (attention deficit disorder) 2014       SUBJECTIVE:  Pt reports doing well.     OBJECTIVE:  Vitals:    24 0934   BP: 116/76   Pulse: 74   SpO2: 99%     See prenatal flowsheet    Physical Exam:  Gen: no acute distress, normal speech  Cardiac: appears warm and well perfused  Pulm: breathing comfortably on room air  Abd: soft, gravid, non-tender  Fundal height: see prenatal flowsheet  Ext: symmetric; moves all 4 extremities equally, no c/t/e    Assessment:  34 y.o.  24w0d   Routine prenatal visit  Encounter Diagnoses   Name Primary?    24 weeks gestation of pregnancy Yes    Antepartum multigravida of advanced maternal age     H/O:         Plan:   Routine follow up OB appointment  Continue routine prenatal care   Routine OB instructions given appropriate to gestational age, see AVS  Disc PP recovery  Dvt/PE precautions    No orders of the defined types were placed in this encounter.       No follow-ups on file.      On this date of service, I have spent greater than 10 minutes reviewing the patient's previous notes, test results and planned follow up as well as performing documentation related to this visit.  More than 50% of this time was spent face to face with the patient discussing the plan of care, diagnosis and importance of compliance with the prenatal care and physician recommendations as well as answering any questions.     [] SAB/bleeding precautions reviewed   []

## 2024-11-08 ENCOUNTER — ROUTINE PRENATAL (OUTPATIENT)
Age: 34
End: 2024-11-08
Payer: COMMERCIAL

## 2024-11-08 VITALS — DIASTOLIC BLOOD PRESSURE: 73 MMHG | HEART RATE: 65 BPM | SYSTOLIC BLOOD PRESSURE: 124 MMHG

## 2024-11-08 DIAGNOSIS — Z3A.25 25 WEEKS GESTATION OF PREGNANCY: Primary | ICD-10-CM

## 2024-11-08 PROCEDURE — 76816 OB US FOLLOW-UP PER FETUS: CPT | Performed by: OBSTETRICS & GYNECOLOGY

## 2024-11-12 NOTE — PROCEDURES
PATIENT: ABUNDIO CONTRERAS   -  : 1990   -  DOS:2024   -  INTERPRETING PROVIDER:Ana Bella,   Indication  ========    Chronic hypertension, History of pre-term delivery    Method  ======    Transabdominal ultrasound examination. View: Good view    Pregnancy  =========    Degroot pregnancy. Number of fetuses: 1    Dating  ======    LMP on: 2024  GA by LMP 24 w + 3 d  CHARAN by LMP: 2025  Previous Ultrasound on: 7/15/2024  Type of prior assessment: GA  GA at prior assessment date 8 w + 3 d  GA by previous U/S 25 w + 0 d  CHARAN by previous Ultrasound: 2025  Ultrasound examination on: 2024  GA by U/S based upon: AC, BPD, Femur, HC  GA by U/S 25 w + 5 d  CHARAN by U/S: 2025  Assigned: based on ultrasound (GA), selected on 2024  Assigned GA 25 w + 0 d  Assigned CHARAN: 2025    Fetal Biometry  ===========    Standard  BPD 64.3 mm 26w 0d 76% Hadlock  OFD 85.4 mm 27w 4d 99% Emy  .4 mm 26w 0d 66% Hadlock  .5 mm 26w 3d 82% Hadlock  Femur 43.8 mm 24w 3d 19% Hadlock   g 25w 3d 68% Hadlock  EFW (lb) 1 lb  EFW (oz) 13 oz  EFW by: Hadlock (BPD-HC-AC-FL)  Other Structures   bpm    General Evaluation  ===============    Cardiac activity present.  bpm. Fetal movements: visualized. Presentation: Transverse, head to maternal right  Placenta: Placental site: anterior, appropriate distance from the internal os  Umbilical cord: Cord vessels: 3 vessel cord. Insertion site: central  Amniotic fluid: Amount of AF: normal. MVP 4.9 cm    Fetal Anatomy  ===========    Cord insertion: normal  Stomach: normal  Kidneys: normal  Bladder: normal  Wants to know fetal sex: yes    Findings  =======    Intrauterine Degroot pregnancy at 25w 0d by clinical dates.  EFW is 833 g at 68%, abdominal circumference at 82%.  Anatomy visualized as stated above.  Amniotic fluid: normal.  Placenta is anterior, appropriate distance from the internal os.  Transverse, head to maternal right

## 2024-11-12 NOTE — RESULT ENCOUNTER NOTE
Pt s/p MFM US and consultation.  MFM report reviewed by Renee Santillan MD.   Plan MFM follow up recommendations.  Pt declined fetal echo  Keep mfm fu

## 2024-11-29 ENCOUNTER — ROUTINE PRENATAL (OUTPATIENT)
Age: 34
End: 2024-11-29
Payer: COMMERCIAL

## 2024-11-29 VITALS
HEART RATE: 89 BPM | DIASTOLIC BLOOD PRESSURE: 85 MMHG | SYSTOLIC BLOOD PRESSURE: 136 MMHG | WEIGHT: 198.2 LBS | BODY MASS INDEX: 32.48 KG/M2

## 2024-11-29 DIAGNOSIS — Z23 NEED FOR VACCINATION: ICD-10-CM

## 2024-11-29 DIAGNOSIS — Z98.891 H/O: C-SECTION: ICD-10-CM

## 2024-11-29 DIAGNOSIS — O09.529 ANTEPARTUM MULTIGRAVIDA OF ADVANCED MATERNAL AGE: Primary | Chronic | ICD-10-CM

## 2024-11-29 DIAGNOSIS — O09.529 ANTEPARTUM MULTIGRAVIDA OF ADVANCED MATERNAL AGE: Chronic | ICD-10-CM

## 2024-11-29 PROCEDURE — 90715 TDAP VACCINE 7 YRS/> IM: CPT | Performed by: OBSTETRICS & GYNECOLOGY

## 2024-11-29 PROCEDURE — 90471 IMMUNIZATION ADMIN: CPT | Performed by: OBSTETRICS & GYNECOLOGY

## 2024-11-29 PROCEDURE — 0502F SUBSEQUENT PRENATAL CARE: CPT | Performed by: OBSTETRICS & GYNECOLOGY

## 2024-11-29 NOTE — PROGRESS NOTES
[facility]  [phone]  Renee Santillan MD, FACOG  [email or web address]      Routine follow-up OB visit     Chief Complaint   Patient presents with    Routine Prenatal Visit       Patient Active Problem List    Diagnosis Date Noted    Antepartum multigravida of advanced maternal age 07/15/2024     section wound seroma, postpartum 06/10/2021    Essential hypertension 2021    Sepsis (HCC) 2016    Depression 2014    ADD (attention deficit disorder) 2014       SUBJECTIVE:  Pt reports right round ligament pain.      OBJECTIVE:  Vitals:    24 0917   BP: 136/85   Pulse: 89     See prenatal flowsheet    Physical Exam:  Gen: no acute distress, normal speech  Cardiac: appears warm and well perfused  Pulm: breathing comfortably on room air  Abd: soft, gravid, non-tender  Fundal height: see prenatal flowsheet  Ext: symmetric; moves all 4 extremities equally    Assessment:  34 y.o.  28w0d   Routine prenatal visit  Encounter Diagnoses   Name Primary?    Antepartum multigravida of advanced maternal age Yes    Need for vaccination        Plan:   Routine follow up OB appointment  Continue routine prenatal care   Routine OB instructions given appropriate to gestational age, see AVS  I discussed the risks of the procedure including bleeding, infection, wound healing problems, blood clots, injury to internal organs including her bladder or bowel, reaction to the surgical prep or reaction to local and general anesthetic.    I also discussed alternatives to surgery including not having surgery.  She understands the risks, benefits and alternatives to the procedure; any and all questions were answered to her satisfaction.  She want to proceed with a: R CS and BS  We discussed bilateral salpingectomy and that it is not reversible.  Tdap  We discussed risks benefits and alternatives of obstetric delivery including possible

## 2024-11-29 NOTE — PROGRESS NOTES
After obtaining consent, and per orders of , injection of TDap given in LDeltoid by Wendy Painter LPN. Patient instructed to remain in clinic for 20 minutes afterwards, and to report any adverse reaction to me immediately. Lot: [id number] Exp: 1/22/27 NDC: 06285-797-49 , VIS given.

## 2024-12-02 LAB
ALBUMIN SERPL-MCNC: 3.2 G/DL (ref 3.5–5)
ALBUMIN/GLOB SERPL: 1 (ref 1.1–2.2)
ALP SERPL-CCNC: 74 U/L (ref 45–117)
ALT SERPL-CCNC: 26 U/L (ref 12–78)
ANION GAP SERPL CALC-SCNC: 7 MMOL/L (ref 2–12)
AST SERPL-CCNC: 13 U/L (ref 15–37)
BASOPHILS # BLD: 0 K/UL (ref 0–0.1)
BASOPHILS NFR BLD: 0 % (ref 0–1)
BILIRUB SERPL-MCNC: 0.3 MG/DL (ref 0.2–1)
BUN SERPL-MCNC: 9 MG/DL (ref 6–20)
BUN/CREAT SERPL: 18 (ref 12–20)
CALCIUM SERPL-MCNC: 8.7 MG/DL (ref 8.5–10.1)
CO2 SERPL-SCNC: 24 MMOL/L (ref 21–32)
COMMENT:: NORMAL
CREAT SERPL-MCNC: 0.5 MG/DL (ref 0.55–1.02)
CREAT UR-MCNC: 92.6 MG/DL
DIFFERENTIAL METHOD BLD: ABNORMAL
EOSINOPHIL # BLD: 0.2 K/UL (ref 0–0.4)
EOSINOPHIL NFR BLD: 2 % (ref 0–7)
ERYTHROCYTE [DISTWIDTH] IN BLOOD BY AUTOMATED COUNT: 13.3 % (ref 11.5–14.5)
GLOBULIN SER CALC-MCNC: 3.3 G/DL (ref 2–4)
GLUCOSE 1H P 100 G GLC PO SERPL-MCNC: 94 MG/DL (ref 65–140)
GLUCOSE SERPL-MCNC: 96 MG/DL (ref 65–100)
HCT VFR BLD AUTO: 34.7 % (ref 35–47)
HGB BLD-MCNC: 11.3 G/DL (ref 11.5–16)
IMM GRANULOCYTES # BLD AUTO: 0.2 K/UL (ref 0–0.04)
IMM GRANULOCYTES NFR BLD AUTO: 2 % (ref 0–0.5)
LYMPHOCYTES # BLD: 1.5 K/UL (ref 0.8–3.5)
LYMPHOCYTES NFR BLD: 15 % (ref 12–49)
MCH RBC QN AUTO: 30.5 PG (ref 26–34)
MCHC RBC AUTO-ENTMCNC: 32.6 G/DL (ref 30–36.5)
MONOCYTES # BLD: 0.6 K/UL (ref 0–1)
MONOCYTES NFR BLD: 6 % (ref 5–13)
NEUTS SEG # BLD: 7.4 K/UL (ref 1.8–8)
NEUTS SEG NFR BLD: 75 % (ref 32–75)
NRBC # BLD: 0 K/UL (ref 0–0.01)
NRBC BLD-RTO: 0 PER 100 WBC
PLATELET # BLD AUTO: 160 K/UL (ref 150–400)
PMV BLD AUTO: 12.9 FL (ref 8.9–12.9)
POTASSIUM SERPL-SCNC: 4 MMOL/L (ref 3.5–5.1)
PROT SERPL-MCNC: 6.5 G/DL (ref 6.4–8.2)
PROT UR-MCNC: 16 MG/DL (ref 0–11.9)
PROT/CREAT UR-RTO: 0.2
RPR SER QL: NONREACTIVE
SODIUM SERPL-SCNC: 138 MMOL/L (ref 136–145)
SPECIMEN HOLD: NORMAL
WBC # BLD AUTO: 9.9 K/UL (ref 3.6–11)

## 2024-12-06 ENCOUNTER — ROUTINE PRENATAL (OUTPATIENT)
Age: 34
End: 2024-12-06
Payer: COMMERCIAL

## 2024-12-06 VITALS — DIASTOLIC BLOOD PRESSURE: 83 MMHG | SYSTOLIC BLOOD PRESSURE: 135 MMHG | HEART RATE: 58 BPM | OXYGEN SATURATION: 99 %

## 2024-12-06 DIAGNOSIS — I10 CHRONIC HYPERTENSION: ICD-10-CM

## 2024-12-06 DIAGNOSIS — O09.523 AMA (ADVANCED MATERNAL AGE) MULTIGRAVIDA 35+, THIRD TRIMESTER: ICD-10-CM

## 2024-12-06 DIAGNOSIS — Z98.891 HISTORY OF C-SECTION: ICD-10-CM

## 2024-12-06 DIAGNOSIS — Z3A.29 29 WEEKS GESTATION OF PREGNANCY: Primary | ICD-10-CM

## 2024-12-06 PROCEDURE — 99999 PR OFFICE/OUTPT VISIT,PROCEDURE ONLY: CPT | Performed by: STUDENT IN AN ORGANIZED HEALTH CARE EDUCATION/TRAINING PROGRAM

## 2024-12-06 PROCEDURE — 76816 OB US FOLLOW-UP PER FETUS: CPT | Performed by: STUDENT IN AN ORGANIZED HEALTH CARE EDUCATION/TRAINING PROGRAM

## 2024-12-09 NOTE — PROGRESS NOTES
Patient was seen 12/9/2024      Please look under media to view full consult and ultrasound report in ViewPoint.         Tamela Greenwood MD   Maternal Fetal Medicine

## 2024-12-09 NOTE — PROCEDURES
PATIENT: ABUNDIO CONTRERAS   -  : 1990   -  DOS:2024   -  INTERPRETING PROVIDER:Tamela Greenwood,   Indication  ========    Chronic hypertension, History of pre-term delivery    Method  ======    Transabdominal ultrasound examination. View: Suboptimal view: limited by fetal position. Suboptimal view: limited by late gestational age    Pregnancy  =========    Degroot pregnancy. Number of fetuses: 1    Dating  ======    LMP on: 2024  GA by LMP 28 w + 3 d  CHARAN by LMP: 2025  Previous Ultrasound on: 7/15/2024  Type of prior assessment: GA  GA at prior assessment date 8 w + 3 d  GA by previous U/S 29 w + 0 d  CHARAN by previous Ultrasound: 2025  Ultrasound examination on: 2024  GA by U/S based upon: AC, BPD, Femur, HC  GA by U/S 31 w + 2 d  CHARAN by U/S: 2025  Assigned: based on ultrasound (GA), selected on 2024  Assigned GA 29 w + 0 d  Assigned CHARAN: 2025    Fetal Biometry  ============    Standard  BPD 79.6 mm 32w 0d 98% Hadlock  OFD 99.0 mm 32w 0d 98% Emy  .7 mm 31w 0d 77% Hadlock  .4 mm 31w 2d 95% Hadlock  Femur 58.7 mm 30w 5d 80% Hadlock  EFW 1,706 g 30w 5d 96% Hadlock  EFW (lb) 3 lb  EFW (oz) 12 oz  EFW by: Hadlock (BPD-HC-AC-FL)  Other Structures   bpm    General Evaluation  ==============    Cardiac activity present.  bpm. Fetal movements: visualized. Presentation: Cephalic  Placenta: Placental site: anterior, appropriate distance from the internal os  Umbilical cord: Cord vessels: 3 vessel cord. Insertion site: central  Amniotic fluid: Amount of AF: polyhydramnios. MVP 7.3 cm. ORSE 27.9 cm. Q1 6.8 cm, Q2 7.3 cm, Q3 6.5 cm, Q4 7.3 cm    Fetal Anatomy  ===========    Stomach: normal  Kidneys: normal  Bladder: normal  Wants to know fetal sex: yes    Biophysical Profile  ==============    2: Fetal breathing movements  2: Gross body movements  2: Fetal tone  2: Amniotic fluid volume  8/8 Biophysical profile score    Findings  =======    Intrauterine

## 2024-12-12 NOTE — RESULT ENCOUNTER NOTE
Pt s/p MFM US and consultation.  MFM report reviewed by Renee Santillan MD.   Plan MFM follow up recommendations.  Mfm fu

## 2024-12-13 ENCOUNTER — ROUTINE PRENATAL (OUTPATIENT)
Age: 34
End: 2024-12-13

## 2024-12-13 VITALS
HEART RATE: 62 BPM | DIASTOLIC BLOOD PRESSURE: 82 MMHG | SYSTOLIC BLOOD PRESSURE: 138 MMHG | HEIGHT: 66 IN | WEIGHT: 196 LBS | BODY MASS INDEX: 31.5 KG/M2

## 2024-12-13 DIAGNOSIS — Z11.3 VENEREAL DISEASE SCREENING: ICD-10-CM

## 2024-12-13 DIAGNOSIS — O09.529 ANTEPARTUM MULTIGRAVIDA OF ADVANCED MATERNAL AGE: ICD-10-CM

## 2024-12-13 DIAGNOSIS — O09.529 ANTEPARTUM MULTIGRAVIDA OF ADVANCED MATERNAL AGE: Primary | ICD-10-CM

## 2024-12-13 NOTE — PROGRESS NOTES
[facility]  [phone]  Renee Santillan MD, FACOG  [email or web address]      Routine follow-up OB visit     Chief Complaint   Patient presents with    Routine Prenatal Visit       Patient Active Problem List    Diagnosis Date Noted    Antepartum multigravida of advanced maternal age 07/15/2024     section wound seroma, postpartum 06/10/2021    Essential hypertension 2021    Sepsis (HCC) 2016    Depression 2014    ADD (attention deficit disorder) 2014       SUBJECTIVE:  Pt reports doing well.      OBJECTIVE:  Vitals:    24 0844   BP: 138/82   Pulse: 62     See prenatal flowsheet    Physical Exam:  Gen: no acute distress, normal speech  Cardiac: appears warm and well perfused  Pulm: breathing comfortably on room air  Abd: soft, gravid, non-tender  Fundal height: see prenatal flowsheet  Ext: symmetric; moves all 4 extremities equally    Assessment:  34 y.o.  30w0d   Routine prenatal visit  Encounter Diagnoses   Name Primary?    Antepartum multigravida of advanced maternal age Yes    Venereal disease screening        Plan:   Routine follow up OB appointment  Continue routine prenatal care   Routine OB instructions given appropriate to gestational age, see AVS      Orders Placed This Encounter   Procedures    Hepatitis C Antibody     Standing Status:   Future     Standing Expiration Date:   2025        No follow-ups on file.      On this date of service, I have spent greater than 10 minutes reviewing the patient's previous notes, test results and planned follow up as well as performing documentation related to this visit.  More than 50% of this time was spent face to face with the patient discussing the plan of care, diagnosis and importance of compliance with the prenatal care and physician recommendations as well as answering any questions.     [] SAB/bleeding precautions reviewed   [] PTL/PPROM

## 2024-12-14 LAB
HCV AB SER IA-ACNC: 0.16 INDEX
HCV AB SERPL QL IA: NONREACTIVE

## 2024-12-20 ENCOUNTER — OFFICE VISIT (OUTPATIENT)
Age: 34
End: 2024-12-20

## 2024-12-20 VITALS
DIASTOLIC BLOOD PRESSURE: 84 MMHG | SYSTOLIC BLOOD PRESSURE: 123 MMHG | HEART RATE: 91 BPM | BODY MASS INDEX: 32.28 KG/M2 | TEMPERATURE: 97.6 F | WEIGHT: 197 LBS | OXYGEN SATURATION: 97 % | RESPIRATION RATE: 16 BRPM

## 2024-12-20 DIAGNOSIS — B96.89 ACUTE BACTERIAL SINUSITIS: Primary | ICD-10-CM

## 2024-12-20 DIAGNOSIS — J01.90 ACUTE BACTERIAL SINUSITIS: Primary | ICD-10-CM

## 2024-12-20 RX ORDER — ASPIRIN 81 MG/1
81 TABLET, CHEWABLE ORAL DAILY
COMMUNITY
Start: 2024-08-20

## 2024-12-20 RX ORDER — CEFPODOXIME PROXETIL 200 MG/1
200 TABLET, FILM COATED ORAL 2 TIMES DAILY
Qty: 10 TABLET | Refills: 0 | Status: SHIPPED | OUTPATIENT
Start: 2024-12-20 | End: 2024-12-25

## 2024-12-20 ASSESSMENT — ENCOUNTER SYMPTOMS
SINUS PRESSURE: 1
VOICE CHANGE: 1
SHORTNESS OF BREATH: 0
SORE THROAT: 1
RHINORRHEA: 0
CHEST TIGHTNESS: 0
COUGH: 0
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
## 2024-12-27 ENCOUNTER — HOSPITAL ENCOUNTER (OUTPATIENT)
Facility: HOSPITAL | Age: 34
Setting detail: OBSERVATION
Discharge: HOME | End: 2024-12-27
Attending: OBSTETRICS & GYNECOLOGY | Admitting: OBSTETRICS & GYNECOLOGY
Payer: COMMERCIAL

## 2024-12-27 ENCOUNTER — ROUTINE PRENATAL (OUTPATIENT)
Age: 34
End: 2024-12-27

## 2024-12-27 VITALS — SYSTOLIC BLOOD PRESSURE: 143 MMHG | HEART RATE: 68 BPM | DIASTOLIC BLOOD PRESSURE: 89 MMHG

## 2024-12-27 VITALS
DIASTOLIC BLOOD PRESSURE: 79 MMHG | SYSTOLIC BLOOD PRESSURE: 119 MMHG | HEART RATE: 75 BPM | BODY MASS INDEX: 31.66 KG/M2 | HEIGHT: 66 IN | WEIGHT: 197 LBS

## 2024-12-27 DIAGNOSIS — Z3A.32 32 WEEKS GESTATION OF PREGNANCY: ICD-10-CM

## 2024-12-27 DIAGNOSIS — O09.529 ANTEPARTUM MULTIGRAVIDA OF ADVANCED MATERNAL AGE: Primary | ICD-10-CM

## 2024-12-27 PROBLEM — O10.013 PRE-EXISTING ESSENTIAL HYPERTENSION DURING PREGNANCY IN THIRD TRIMESTER: Status: ACTIVE | Noted: 2024-12-27

## 2024-12-27 PROBLEM — O16.3 HYPERTENSION AFFECTING PREGNANCY IN THIRD TRIMESTER: Status: ACTIVE | Noted: 2024-12-27

## 2024-12-27 LAB
ALBUMIN SERPL-MCNC: 2.6 G/DL (ref 3.5–5)
ALBUMIN/GLOB SERPL: 0.8 (ref 1.1–2.2)
ALP SERPL-CCNC: 118 U/L (ref 45–117)
ALT SERPL-CCNC: 24 U/L (ref 12–78)
ANION GAP SERPL CALC-SCNC: 4 MMOL/L (ref 2–12)
APPEARANCE UR: CLEAR
AST SERPL-CCNC: 17 U/L (ref 15–37)
BACTERIA URNS QL MICRO: NEGATIVE /HPF
BASOPHILS # BLD: 0 K/UL (ref 0–0.1)
BASOPHILS NFR BLD: 0 % (ref 0–1)
BILIRUB SERPL-MCNC: 0.3 MG/DL (ref 0.2–1)
BILIRUB UR QL: NEGATIVE
BUN SERPL-MCNC: 10 MG/DL (ref 6–20)
BUN/CREAT SERPL: 19 (ref 12–20)
CALCIUM SERPL-MCNC: 8.4 MG/DL (ref 8.5–10.1)
CHLORIDE SERPL-SCNC: 107 MMOL/L (ref 97–108)
CO2 SERPL-SCNC: 26 MMOL/L (ref 21–32)
COLOR UR: ABNORMAL
COMMENT:: NORMAL
CREAT SERPL-MCNC: 0.53 MG/DL (ref 0.55–1.02)
CREAT UR-MCNC: 181 MG/DL
DIFFERENTIAL METHOD BLD: ABNORMAL
EOSINOPHIL # BLD: 0.1 K/UL (ref 0–0.4)
EOSINOPHIL NFR BLD: 1 % (ref 0–7)
EPITH CASTS URNS QL MICRO: ABNORMAL /LPF
ERYTHROCYTE [DISTWIDTH] IN BLOOD BY AUTOMATED COUNT: 12.8 % (ref 11.5–14.5)
GLOBULIN SER CALC-MCNC: 3.1 G/DL (ref 2–4)
GLUCOSE SERPL-MCNC: 94 MG/DL (ref 65–100)
GLUCOSE UR STRIP.AUTO-MCNC: NEGATIVE MG/DL
HCT VFR BLD AUTO: 33.7 % (ref 35–47)
HGB BLD-MCNC: 11.1 G/DL (ref 11.5–16)
HGB UR QL STRIP: NEGATIVE
HYALINE CASTS URNS QL MICRO: ABNORMAL /LPF (ref 0–2)
IMM GRANULOCYTES # BLD AUTO: 0.1 K/UL (ref 0–0.04)
IMM GRANULOCYTES NFR BLD AUTO: 1 % (ref 0–0.5)
KETONES UR QL STRIP.AUTO: NEGATIVE MG/DL
LEUKOCYTE ESTERASE UR QL STRIP.AUTO: NEGATIVE
LYMPHOCYTES # BLD: 1.6 K/UL (ref 0.8–3.5)
LYMPHOCYTES NFR BLD: 22 % (ref 12–49)
MCH RBC QN AUTO: 29.8 PG (ref 26–34)
MCHC RBC AUTO-ENTMCNC: 32.9 G/DL (ref 30–36.5)
MCV RBC AUTO: 90.6 FL (ref 80–99)
MONOCYTES # BLD: 0.6 K/UL (ref 0–1)
MONOCYTES NFR BLD: 7 % (ref 5–13)
NEUTS SEG # BLD: 5.1 K/UL (ref 1.8–8)
NEUTS SEG NFR BLD: 69 % (ref 32–75)
NITRITE UR QL STRIP.AUTO: NEGATIVE
NRBC # BLD: 0 K/UL (ref 0–0.01)
NRBC BLD-RTO: 0 PER 100 WBC
PH UR STRIP: 8 (ref 5–8)
PLATELET # BLD AUTO: 158 K/UL (ref 150–400)
PMV BLD AUTO: 12.4 FL (ref 8.9–12.9)
POTASSIUM SERPL-SCNC: 4.7 MMOL/L (ref 3.5–5.1)
PROT SERPL-MCNC: 5.7 G/DL (ref 6.4–8.2)
PROT UR STRIP-MCNC: NEGATIVE MG/DL
PROT UR-MCNC: 25 MG/DL (ref 0–11.9)
PROT/CREAT UR-RTO: 0.1
RBC # BLD AUTO: 3.72 M/UL (ref 3.8–5.2)
RBC #/AREA URNS HPF: ABNORMAL /HPF (ref 0–5)
SODIUM SERPL-SCNC: 137 MMOL/L (ref 136–145)
SP GR UR REFRACTOMETRY: 1.02 (ref 1–1.03)
SPECIMEN HOLD: NORMAL
URATE SERPL-MCNC: 4.2 MG/DL (ref 2.6–6)
URINE CULTURE IF INDICATED: ABNORMAL
UROBILINOGEN UR QL STRIP.AUTO: 1 EU/DL (ref 0.2–1)
WBC # BLD AUTO: 7.5 K/UL (ref 3.6–11)
WBC URNS QL MICRO: ABNORMAL /HPF (ref 0–4)

## 2024-12-27 PROCEDURE — 99202 OFFICE O/P NEW SF 15 MIN: CPT

## 2024-12-27 PROCEDURE — 84550 ASSAY OF BLOOD/URIC ACID: CPT

## 2024-12-27 PROCEDURE — 87088 URINE BACTERIA CULTURE: CPT

## 2024-12-27 PROCEDURE — 85025 COMPLETE CBC W/AUTO DIFF WBC: CPT

## 2024-12-27 PROCEDURE — G0378 HOSPITAL OBSERVATION PER HR: HCPCS

## 2024-12-27 PROCEDURE — 82570 ASSAY OF URINE CREATININE: CPT

## 2024-12-27 PROCEDURE — 80053 COMPREHEN METABOLIC PANEL: CPT

## 2024-12-27 PROCEDURE — 81001 URINALYSIS AUTO W/SCOPE: CPT

## 2024-12-27 PROCEDURE — 87086 URINE CULTURE/COLONY COUNT: CPT

## 2024-12-27 PROCEDURE — 36415 COLL VENOUS BLD VENIPUNCTURE: CPT

## 2024-12-27 PROCEDURE — 99213 OFFICE O/P EST LOW 20 MIN: CPT | Performed by: OBSTETRICS & GYNECOLOGY

## 2024-12-27 PROCEDURE — 84156 ASSAY OF PROTEIN URINE: CPT

## 2024-12-27 PROCEDURE — 59025 FETAL NON-STRESS TEST: CPT | Performed by: OBSTETRICS & GYNECOLOGY

## 2024-12-27 PROCEDURE — 0502F SUBSEQUENT PRENATAL CARE: CPT | Performed by: OBSTETRICS & GYNECOLOGY

## 2024-12-27 PROCEDURE — 87186 SC STD MICRODIL/AGAR DIL: CPT

## 2024-12-27 PROCEDURE — G0379 DIRECT REFER HOSPITAL OBSERV: HCPCS

## 2024-12-27 RX ORDER — RESPIRATORY SYNCYTIAL VIRUS VACCINE 120MCG/0.5
0.5 KIT INTRAMUSCULAR ONCE
Qty: 0.5 ML | Refills: 0 | Status: SHIPPED | OUTPATIENT
Start: 2024-12-27 | End: 2024-12-27

## 2024-12-27 NOTE — H&P
[] Continue admission on Labor and Delivery    [] Continue observation on Labor and Delivery   [] Keep routine OB follow up upon discharge   [] Reviewed fetal movement kick counts, notify MD if decreased   [] Continue continuous fetal monitoring.   []        On this date, 12/27/2024,  I have spent 30 minutes reviewing previous notes, examining the patient, reviewing test results and face to face time with the patient discussing the diagnosis, plan of care and importance of compliance with the treatment plan and perfroming an exam.  We reviewed the planned hospital course as well as documented on the day of the hospitalization.

## 2024-12-27 NOTE — PROGRESS NOTES
0956 Pt presents to unit for r/o pre-e. Pt c/o HA x3 days. Pt admits to having a sinus infection and is on abx week x1 week. Pt states she has high bps 180s/100s yesterday at home. Orders to be put in by provider.    131Abigail Santillan MD at bedside discussing POC with pt. Pt HA resolved.    1504 Pt given verbal and written discharge instructions. Automatic bp given to pt.    1511 Pt ambulated off of unit in stable condition with

## 2024-12-27 NOTE — PROGRESS NOTES
[facility]  [phone]  Renee Santillan MD, FACOG  [email or web address]      Routine follow-up OB visit     Chief Complaint   Patient presents with    Routine Prenatal Visit    Elevated Home Blood Pressure Reading       Patient Active Problem List    Diagnosis Date Noted    Antepartum multigravida of advanced maternal age 07/15/2024     section wound seroma, postpartum 06/10/2021    Essential hypertension 2021    Sepsis (HCC) 2016    Depression 2014    ADD (attention deficit disorder) 2014       SUBJECTIVE:  Pt reports pt co HA and elevated.  On abx for sinus infection.    History of Present Illness  The patient presents for evaluation of elevated blood pressure.    She reported an episode of elevated blood pressure yesterday, which she monitored at home using her own cuff. The initial reading was 153/101, taken 15 minutes after a shower. Subsequent readings were 151/108, 150/102, 148/93, 139/75, 149/83, 133/84.  She has been taking Tylenol for headache. She has not taken any cold medicine or Sudafed. She has been monitoring her blood pressure throughout her pregnancy, with previous readings consistent with those obtained in the office. She reports no swelling and maintains a stable weight. She continues to take labetalol 200 mg twice daily, with doses taken approximately 10 to 12 hours apart. She took her most recent dose at 7:15 this morning. She is aware of the need to monitor her blood pressure for a day following a high spike.    She reports experiencing sinus discomfort since , which has been intermittent. She was prescribed an antibiotic last Friday at an urgent care facility due to a severe sinus infection.    She has an ultrasound scheduled for today at 2:30 PM. She reports that the baby has been moving well, although with some decrease in activity on Mait[place] Day. She notes that the

## 2024-12-29 LAB
BACTERIA SPEC CULT: ABNORMAL
CC UR VC: ABNORMAL
SERVICE CMNT-IMP: ABNORMAL

## 2024-12-29 NOTE — RESULT ENCOUNTER NOTE
Normal, reviewed  Dizmo message sent if active.  Update PNL/PL if pregnant  32w2d  Needs repeat CLEAN CATCH urine at [facility]  Consider macrobid (intermediate) if having sx, but hold for now.  ALL:   -- Amoxicillin-Pot Clavulanate -- Other (See Comments)    --  Diarrhea, vomiting, heartburn and nausea.             Tolerated zpak/azithromycin/clindamycin   -- Azithromycin     --  Increase HR   -- Hydroxyzine    -- Penicillins -- Rash   -- Sulfa Antibiotics -- Rash    --  Elevated HR

## 2024-12-30 ENCOUNTER — ROUTINE PRENATAL (OUTPATIENT)
Age: 34
End: 2024-12-30

## 2024-12-30 ENCOUNTER — CARE COORDINATION (OUTPATIENT)
Dept: OTHER | Facility: CLINIC | Age: 34
End: 2024-12-30

## 2024-12-30 ENCOUNTER — CLINICAL DOCUMENTATION (OUTPATIENT)
Dept: OTHER | Facility: CLINIC | Age: 34
End: 2024-12-30

## 2024-12-30 ENCOUNTER — HOSPITAL ENCOUNTER (OUTPATIENT)
Facility: HOSPITAL | Age: 34
Setting detail: OBSERVATION
Discharge: HOME | End: 2024-12-30
Attending: OBSTETRICS & GYNECOLOGY | Admitting: OBSTETRICS & GYNECOLOGY
Payer: COMMERCIAL

## 2024-12-30 VITALS
BODY MASS INDEX: 32.21 KG/M2 | SYSTOLIC BLOOD PRESSURE: 141 MMHG | HEIGHT: 66 IN | DIASTOLIC BLOOD PRESSURE: 90 MMHG | HEART RATE: 81 BPM | WEIGHT: 200.4 LBS

## 2024-12-30 VITALS
BODY MASS INDEX: 33.32 KG/M2 | SYSTOLIC BLOOD PRESSURE: 131 MMHG | TEMPERATURE: 98.2 F | HEIGHT: 65 IN | WEIGHT: 200 LBS | OXYGEN SATURATION: 100 % | DIASTOLIC BLOOD PRESSURE: 83 MMHG | RESPIRATION RATE: 18 BRPM | HEART RATE: 64 BPM

## 2024-12-30 DIAGNOSIS — O10.013 PRE-EXISTING ESSENTIAL HYPERTENSION DURING PREGNANCY IN THIRD TRIMESTER: Primary | ICD-10-CM

## 2024-12-30 DIAGNOSIS — R03.0 ELEVATED BLOOD PRESSURE READING: ICD-10-CM

## 2024-12-30 LAB
ALBUMIN SERPL-MCNC: 2.8 G/DL (ref 3.5–5)
ALBUMIN/GLOB SERPL: 0.9 (ref 1.1–2.2)
ALP SERPL-CCNC: 116 U/L (ref 45–117)
ALT SERPL-CCNC: 26 U/L (ref 12–78)
ANION GAP SERPL CALC-SCNC: 6 MMOL/L (ref 2–12)
APPEARANCE UR: CLEAR
AST SERPL-CCNC: 17 U/L (ref 15–37)
BACTERIA URNS QL MICRO: NEGATIVE /HPF
BASOPHILS # BLD: 0 K/UL (ref 0–0.1)
BASOPHILS NFR BLD: 0 % (ref 0–1)
BILIRUB SERPL-MCNC: 0.4 MG/DL (ref 0.2–1)
BILIRUB UR QL: NEGATIVE
BUN SERPL-MCNC: 11 MG/DL (ref 6–20)
BUN/CREAT SERPL: 20 (ref 12–20)
CALCIUM SERPL-MCNC: 8.8 MG/DL (ref 8.5–10.1)
CHLORIDE SERPL-SCNC: 108 MMOL/L (ref 97–108)
CO2 SERPL-SCNC: 23 MMOL/L (ref 21–32)
COLOR UR: ABNORMAL
CREAT SERPL-MCNC: 0.54 MG/DL (ref 0.55–1.02)
CREAT UR-MCNC: 31 MG/DL
DIFFERENTIAL METHOD BLD: ABNORMAL
EOSINOPHIL # BLD: 0.2 K/UL (ref 0–0.4)
EOSINOPHIL NFR BLD: 2 % (ref 0–7)
EPITH CASTS URNS QL MICRO: ABNORMAL /LPF
ERYTHROCYTE [DISTWIDTH] IN BLOOD BY AUTOMATED COUNT: 12.6 % (ref 11.5–14.5)
GLOBULIN SER CALC-MCNC: 3 G/DL (ref 2–4)
GLUCOSE SERPL-MCNC: 82 MG/DL (ref 65–100)
GLUCOSE UR STRIP.AUTO-MCNC: NEGATIVE MG/DL
HCT VFR BLD AUTO: 31.8 % (ref 35–47)
HGB BLD-MCNC: 11 G/DL (ref 11.5–16)
HGB UR QL STRIP: ABNORMAL
HYALINE CASTS URNS QL MICRO: ABNORMAL /LPF (ref 0–2)
IMM GRANULOCYTES # BLD AUTO: 0.2 K/UL (ref 0–0.04)
IMM GRANULOCYTES NFR BLD AUTO: 2 % (ref 0–0.5)
KETONES UR QL STRIP.AUTO: NEGATIVE MG/DL
LEUKOCYTE ESTERASE UR QL STRIP.AUTO: NEGATIVE
LYMPHOCYTES # BLD: 1.5 K/UL (ref 0.8–3.5)
LYMPHOCYTES NFR BLD: 17 % (ref 12–49)
MCH RBC QN AUTO: 30.5 PG (ref 26–34)
MCHC RBC AUTO-ENTMCNC: 34.6 G/DL (ref 30–36.5)
MCV RBC AUTO: 88.1 FL (ref 80–99)
MONOCYTES # BLD: 0.7 K/UL (ref 0–1)
MONOCYTES NFR BLD: 7 % (ref 5–13)
NEUTS SEG # BLD: 6.5 K/UL (ref 1.8–8)
NEUTS SEG NFR BLD: 72 % (ref 32–75)
NITRITE UR QL STRIP.AUTO: NEGATIVE
NRBC # BLD: 0 K/UL (ref 0–0.01)
NRBC BLD-RTO: 0 PER 100 WBC
PH UR STRIP: 6.5 (ref 5–8)
PLATELET # BLD AUTO: 161 K/UL (ref 150–400)
PMV BLD AUTO: 13 FL (ref 8.9–12.9)
POTASSIUM SERPL-SCNC: 4.4 MMOL/L (ref 3.5–5.1)
PROT SERPL-MCNC: 5.8 G/DL (ref 6.4–8.2)
PROT UR STRIP-MCNC: NEGATIVE MG/DL
PROT UR-MCNC: <5 MG/DL (ref 0–11.9)
PROT/CREAT UR-RTO: <0.2
RBC # BLD AUTO: 3.61 M/UL (ref 3.8–5.2)
RBC #/AREA URNS HPF: ABNORMAL /HPF (ref 0–5)
SODIUM SERPL-SCNC: 137 MMOL/L (ref 136–145)
SP GR UR REFRACTOMETRY: 1.01 (ref 1–1.03)
URATE SERPL-MCNC: 3.8 MG/DL (ref 2.6–6)
URINE CULTURE IF INDICATED: ABNORMAL
UROBILINOGEN UR QL STRIP.AUTO: 0.2 EU/DL (ref 0.2–1)
WBC # BLD AUTO: 9 K/UL (ref 3.6–11)
WBC URNS QL MICRO: ABNORMAL /HPF (ref 0–4)

## 2024-12-30 PROCEDURE — 99214 OFFICE O/P EST MOD 30 MIN: CPT

## 2024-12-30 PROCEDURE — G0379 DIRECT REFER HOSPITAL OBSERV: HCPCS

## 2024-12-30 PROCEDURE — 85025 COMPLETE CBC W/AUTO DIFF WBC: CPT

## 2024-12-30 PROCEDURE — 80053 COMPREHEN METABOLIC PANEL: CPT

## 2024-12-30 PROCEDURE — 87086 URINE CULTURE/COLONY COUNT: CPT

## 2024-12-30 PROCEDURE — 84156 ASSAY OF PROTEIN URINE: CPT

## 2024-12-30 PROCEDURE — 82570 ASSAY OF URINE CREATININE: CPT

## 2024-12-30 PROCEDURE — 84550 ASSAY OF BLOOD/URIC ACID: CPT

## 2024-12-30 PROCEDURE — 36415 COLL VENOUS BLD VENIPUNCTURE: CPT

## 2024-12-30 PROCEDURE — 59025 FETAL NON-STRESS TEST: CPT | Performed by: OBSTETRICS & GYNECOLOGY

## 2024-12-30 PROCEDURE — 0502F SUBSEQUENT PRENATAL CARE: CPT | Performed by: OBSTETRICS & GYNECOLOGY

## 2024-12-30 PROCEDURE — 81001 URINALYSIS AUTO W/SCOPE: CPT

## 2024-12-30 PROCEDURE — 6370000000 HC RX 637 (ALT 250 FOR IP): Performed by: OBSTETRICS & GYNECOLOGY

## 2024-12-30 PROCEDURE — G0378 HOSPITAL OBSERVATION PER HR: HCPCS

## 2024-12-30 RX ORDER — LABETALOL 200 MG/1
200 TABLET, FILM COATED ORAL ONCE
Status: COMPLETED | OUTPATIENT
Start: 2024-12-30 | End: 2024-12-30

## 2024-12-30 RX ORDER — BUTALBITAL, ACETAMINOPHEN AND CAFFEINE 50; 325; 40 MG/1; MG/1; MG/1
1 TABLET ORAL EVERY 4 HOURS PRN
Status: DISCONTINUED | OUTPATIENT
Start: 2024-12-30 | End: 2024-12-30 | Stop reason: HOSPADM

## 2024-12-30 RX ADMIN — BUTALBITAL, ACETAMINOPHEN, AND CAFFEINE 1 TABLET: 50; 325; 40 TABLET ORAL at 13:48

## 2024-12-30 RX ADMIN — LABETALOL HYDROCHLORIDE 200 MG: 200 TABLET, FILM COATED ORAL at 13:30

## 2024-12-30 ASSESSMENT — PAIN DESCRIPTION - DESCRIPTORS: DESCRIPTORS: ACHING

## 2024-12-30 ASSESSMENT — PAIN SCALES - GENERAL: PAINLEVEL_OUTOF10: 4

## 2024-12-30 ASSESSMENT — PAIN DESCRIPTION - ORIENTATION: ORIENTATION: RIGHT

## 2024-12-30 ASSESSMENT — PAIN DESCRIPTION - LOCATION: LOCATION: HEAD

## 2024-12-30 NOTE — H&P
Prior to Admission medications    Medication Sig Start Date End Date Taking? Authorizing Provider   ASPIRIN 81 PO Take by mouth   Yes Wilber Valadez MD   Cholecalciferol (VITAMIN D) 50 MCG (2000 UT) CAPS capsule Take by mouth   Yes Wilber Valadez MD   Prenatal Vit-Fe Fumarate-FA (PRENATAL VITAMIN PO) Take 1 tablet by mouth daily   Yes Wilber Valadez MD   buPROPion (WELLBUTRIN XL) 150 MG extended release tablet Take 1 tablet by mouth 11/6/20  Yes Automatic Reconciliation, Ar   labetalol (NORMODYNE) 200 MG tablet Take 1 tablet by mouth in the morning, at noon, and at bedtime ceived the following from [facility]: Outside name: labetaloL (NORMODYNE) 200 mg tablet 8/3/21  Yes Automatic Reconciliation, Ar   hydrOXYzine HCl (ATARAX) 10 MG tablet Take 2.5 tablets by mouth 3 times daily as needed for Anxiety  Patient not taking: Reported on 12/30/2024    Wilber Valadez MD        Principal Problem:    Hypertension affecting pregnancy in third trimester  Resolved Problems:    * No resolved hospital problems. *      Review of Systems: A comprehensive review of systems was negative except for that written in the HPI.  Constitutional: negative for fevers, chills and weight loss  ENT ROS: see HPI  Respiratory: negative for cough, wheezing or dyspnea on exertion  Cardiovascular: negative for chest pain, irregular heart beats, exertional chest pressure/discomfort  Gastrointestinal: negative for dysphagia, nausea and vomiting  Genito-Urinary ROS: see HPI  Inteument/breast: negative for rash, breast lump and nipple discharge  Musculoskeletal:negative for stiff joints, neck pain and muscle weakness  Endocrine ROS: negative for - breast changes, galactorrhea or temperature intolerance  Hematological and Lymphatic ROS: negative for - bruising or swollen lymph nodes      Objective:     Vitals:  Patient Vitals for the past 4 hrs:   Pulse Resp BP SpO2   12/30/24 1653 64 -- 131/83 --

## 2024-12-30 NOTE — PROGRESS NOTES
Chart review completed. Pt currently admitted to hospital. Will call pt once she d/c form the hospital.

## 2024-12-30 NOTE — PROGRESS NOTES
1226:Patient arrives to labor and delivery from Dr. Santillan's office for rule out pre-eclampsia.    1312: Called Dr. Santillan and notified provider that patient is due for her scheduled labetalol 200 mg tablet at this time, per verbal order of Dr. Santillan, nurse to order and give medication at this time.     1400: Dr. Santillan at the bedside at this time speaking to patient.     1655: Dr. Santillan at the bedside at this time assessing and speaking to patient.     1705: Discharge instructions reviewed with patient Dr. Santillan.     1719: AVS reviewed with patient. Patient verbalized understanding.

## 2024-12-30 NOTE — PROGRESS NOTES
12/30/2024        RE: Neeta Magdaleno         [address] [facility] [address]          To Whom It May Concern,      Due to pregnancy complications , Neeta Magdaleno, should not return to work until 1/2/24.        Sincerely,    MAGGIE Santillan MD

## 2024-12-30 NOTE — PROGRESS NOTES
[facility]  [phone]  Renee Santillan MD, FACOG    OB/GYN: OB Problem visit    Chief Complaint:   Chief Complaint   Patient presents with    Blood Pressure Check    Pregnancy    L&D Follow Up       Patient Active Problem List    Diagnosis Date Noted    Hypertension affecting pregnancy in third trimester 2024    Pre-existing essential hypertension during pregnancy in third trimester 2024    Antepartum multigravida of advanced maternal age 07/15/2024     section wound seroma, postpartum 06/10/2021    Essential hypertension 2021    Sepsis (HCC) 2016    Depression 2014    ADD (attention deficit disorder) 2014       History of Present Illness  The patient presents for evaluation of headache, sinus pressure, urinary tract infection, and gestational hypertension.    She reports a persistent headache that initially subsided upon discharge from the hospital but recurred after a few hours. The headache, described as a dull sensation in the posterior cranium, is not unusual for her as a chronic migraine sufferer. However, the intensity of the headache escalated with an impending storm. Despite attempts to manage the pain without medication, she resorted to taking two extra-strength Tylenol at 5:30 AM, which provided some relief. She also experienced pain behind the eye, radiating into the sinuses, neck, and shoulder. She vomited twice this morning and noticed her pupils were smaller, indicative of an impending migraine based on her previous experiences.    She completed a 5-day course of antibiotics, which she started on 2024, and continues to experience nasal discharge, although it is not severe. She has been maintaining hydration and reports no urinary symptoms, noting that her urine color and odor have improved.    She has an upcoming appointment with Maternal-Fetal Medicine ([facility]) on Friday, 2025, for a non-stress test at 2:45 PM and an

## 2024-12-30 NOTE — PROGRESS NOTES
Rooming note, OB problem visit    Chief Complaint   Patient presents with    Blood Pressure Check    Pregnancy    L&D Follow Up     Neeta Magdaleno is a 34 y.o. female presents for a problem visit.    32w3d    The patient is reporting having: follow up after being sent to L&D on 12/27/2024 d/t elevated BP.     C/o a headache since yesterday AM, took tylenol NI. Headache is behind her right eye & near sinus & radiates down to her right side of her neck. Took tylnol at 5:30am after warm shower. Pt almost vomited from her headache this AM. Pt thought the headache was d/t the flora yesterday but does not think that anymore.     Pt reports she see's dots in her vision after being around lots of florescent lights. Pt reports she does have a astigmatism.     Pt reports fetal movements have periodically slowed down, movements were \"sluggish\" yesterday, but today was normal. Pt reports more push movements vs kicks.     Appetite WNL per pt    Reports right groin pain x yesterday       This is not a new problem.  This is not a routinely scheduled follow up OB visit.  She has experienced this problem before.    She reports the symptoms are has worsened.  Aggravating factors include none.  And alleviating factors include none.    She does not have other concerns.

## 2024-12-30 NOTE — CARE COORDINATION
Patient eligible for [facility] Maternity Management Program    Maternity Care Manager contacted the patient by telephone to discuss the maternity management program.  Patient agrees to care management services at this time. Verified name and  with patient as identifiers.   Patient Current Location: Home: [address] [place] [id number]      Call within 2 business days of discharge: Yes     Last Discharge Facility       Date Complaint Diagnosis Description Type Department Provider    24 Hypertension  Admission (Current) [id number] Renee Santillan MD            Risk Factors Identified: Depression/Mental Health    Previous htn during pregnancy/bp concerns now    Needs to be reviewed by the provider   none         Method of communication with provider : none    Advance Care Planning:   Does patient have an Advance Directive:  not on file.     Does patient have OB/Gyn Selected? Yes    Discussed follow up appointments. If no appointment was previously scheduled, appointment scheduling offered: Yes  BS follow up appointment(s):   Future Appointments   Date Time Provider Department Center   1/3/2025  2:45 PM NURSE [facility] AMB   1/3/2025  3:15 PM ULTRASOUND 1 AMB [facility] AMB   1/10/2025  8:50 AM Renee Santillan MD [facility] AMB   1/10/2025  2:45 PM NURSE [facility] AMB   1/10/2025  3:15 PM ULTRASOUND 1 AMB [facility] AMB   2025  2:45 PM NURSE [facility] AMB   2025  3:15 PM ULTRASOUND 1 AMB [facility] AMB   2025  8:50 AM Renee Santillan MD [facility] AMB   2/3/2025  2:10 PM Renee Santillan MD [facility] AMB   2025  7:30 AM Renee Santillan MD [facility] AMB   2025  8:50 AM Renee Santillan MD [facility] AMB     Non-[facility] follow up appointment(s): n/a    OB History:   OB History    Para Term  AB Living   2 1 1 0 0 1   SAB IAB Ectopic Molar Multiple Live Births             1      # Outcome Date GA Lbr Cruz/2nd Weight Sex

## 2024-12-31 LAB
BACTERIA SPEC CULT: NORMAL
SERVICE CMNT-IMP: NORMAL

## 2025-01-03 ENCOUNTER — ROUTINE PRENATAL (OUTPATIENT)
Age: 35
End: 2025-01-03

## 2025-01-03 VITALS — HEART RATE: 64 BPM | DIASTOLIC BLOOD PRESSURE: 82 MMHG | SYSTOLIC BLOOD PRESSURE: 126 MMHG

## 2025-01-03 DIAGNOSIS — Z3A.32 32 WEEKS GESTATION OF PREGNANCY: Primary | ICD-10-CM

## 2025-01-03 NOTE — PROCEDURES
PATIENT: ABUNDIO CONTRERAS   -  : 1990   -  DOS:2025   -  INTERPRETING PROVIDER:Jose Will,   Indication  ========    Chronic hypertension, History of pre-term delivery, Advanced Maternal Age, Obesity in pregnancy    Method  ======    Transabdominal ultrasound examination and external fetal monitor. View: suboptimal due to maternal acoustic properties    Pregnancy  =========    Degroot pregnancy. Number of fetuses: 1    Dating  ======    LMP on: 2024  GA by LMP 32 w + 3 d  CHARAN by LMP: 2025  Previous Ultrasound on: 7/15/2024  Type of prior assessment: GA  GA at prior assessment date 8 w + 3 d  GA by previous U/S 33 w + 0 d  CHARAN by previous Ultrasound: 2025  Ultrasound examination on: 1/3/2025  GA by U/S based upon: AC, BPD, Femur, HC  GA by U/S 34 w + 1 d  CHARAN by U/S: 2025  Assigned: based on ultrasound (GA), selected on 2024  Assigned GA 33 w + 0 d  Assigned CHARAN: 2025    Fetal Biometry  ============    Standard  BPD 84.1 mm 33w 6d 69% Hadlock  .1 mm 36w 4d 99% Emy  .0 mm 34w 3d 52% Hadlock  .5 mm 35w 6d 99% Hadlock  Femur 63.0 mm 32w 4d 28% Hadlock  EFW 2,484 g 34w 3d 87% Hadlock  EFW (lb) 5 lb  EFW (oz) 8 oz  EFW by: Hadlock (BPD-HC-AC-FL)  Other Structures   bpm    General Evaluation  ==============    Cardiac activity present.  bpm. Fetal movements: visualized. Presentation: Cephalic  Placenta: Placental site: anterior, appropriate distance from the internal os  Umbilical cord: Cord vessels: 3 vessel cord  Amniotic fluid: Amount of AF: normal. MVP 3.5 cm. ROSE 10.1 cm. Q1 1.3 cm, Q2 2.7 cm, Q3 3.5 cm, Q4 2.7 cm    Fetal Anatomy  ===========    Stomach: normal  Kidneys: normal  Bladder: normal  Wants to know fetal sex: yes    Non Stress Test  =============    NST interpretation: reactive. Test duration 20 min. Baseline  bpm. Baseline variability: moderate. Accelerations: present. Decelerations: absent. Uterine

## 2025-01-03 NOTE — PROGRESS NOTES
Per patient, has been taking bp at home, pressures have ranged from 130s//90 -- down to 143/84 shortly after. 136/84 last night. 135/79 this morning, 140s/90s at work today.

## 2025-01-07 ENCOUNTER — HOSPITAL ENCOUNTER (OUTPATIENT)
Facility: HOSPITAL | Age: 35
Setting detail: OBSERVATION
Discharge: HOME | End: 2025-01-07
Attending: OBSTETRICS & GYNECOLOGY | Admitting: OBSTETRICS & GYNECOLOGY

## 2025-01-07 ENCOUNTER — CARE COORDINATION (OUTPATIENT)
Dept: OTHER | Facility: CLINIC | Age: 35
End: 2025-01-07

## 2025-01-07 VITALS
HEART RATE: 71 BPM | SYSTOLIC BLOOD PRESSURE: 141 MMHG | OXYGEN SATURATION: 98 % | RESPIRATION RATE: 18 BRPM | TEMPERATURE: 98 F | DIASTOLIC BLOOD PRESSURE: 74 MMHG

## 2025-01-07 PROBLEM — Z3A.33 33 WEEKS GESTATION OF PREGNANCY: Status: ACTIVE | Noted: 2025-01-07

## 2025-01-07 PROBLEM — O10.013 PRE-EXISTING ESSENTIAL HYPERTENSION COMPLICATING PREGNANCY IN THIRD TRIMESTER: Status: ACTIVE | Noted: 2025-01-07

## 2025-01-07 LAB
ALBUMIN SERPL-MCNC: 2.6 G/DL (ref 3.5–5)
ALBUMIN/GLOB SERPL: 0.8 (ref 1.1–2.2)
ALP SERPL-CCNC: 112 U/L (ref 45–117)
ALT SERPL-CCNC: 19 U/L (ref 12–78)
ANION GAP SERPL CALC-SCNC: 8 MMOL/L (ref 2–12)
AST SERPL-CCNC: 15 U/L (ref 15–37)
BILIRUB SERPL-MCNC: 0.4 MG/DL (ref 0.2–1)
BUN SERPL-MCNC: 10 MG/DL (ref 6–20)
BUN/CREAT SERPL: 19 (ref 12–20)
CALCIUM SERPL-MCNC: 8.6 MG/DL (ref 8.5–10.1)
CHLORIDE SERPL-SCNC: 107 MMOL/L (ref 97–108)
CO2 SERPL-SCNC: 22 MMOL/L (ref 21–32)
CREAT SERPL-MCNC: 0.53 MG/DL (ref 0.55–1.02)
CREAT UR-MCNC: 95 MG/DL
ERYTHROCYTE [DISTWIDTH] IN BLOOD BY AUTOMATED COUNT: 13.4 % (ref 11.5–14.5)
GLOBULIN SER CALC-MCNC: 3.1 G/DL (ref 2–4)
GLUCOSE SERPL-MCNC: 79 MG/DL (ref 65–100)
HCT VFR BLD AUTO: 31.2 % (ref 35–47)
HGB BLD-MCNC: 10.4 G/DL (ref 11.5–16)
MCH RBC QN AUTO: 29.9 PG (ref 26–34)
MCHC RBC AUTO-ENTMCNC: 33.3 G/DL (ref 30–36.5)
MCV RBC AUTO: 89.7 FL (ref 80–99)
NRBC # BLD: 0 K/UL (ref 0–0.01)
NRBC BLD-RTO: 0 PER 100 WBC
NT PRO BNP: 75 PG/ML
PLATELET # BLD AUTO: 174 K/UL (ref 150–400)
POTASSIUM SERPL-SCNC: 4.1 MMOL/L (ref 3.5–5.1)
PROT SERPL-MCNC: 5.7 G/DL (ref 6.4–8.2)
PROT UR-MCNC: 20 MG/DL (ref 0–11.9)
PROT/CREAT UR-RTO: 0.2
RBC # BLD AUTO: 3.48 M/UL (ref 3.8–5.2)
SODIUM SERPL-SCNC: 137 MMOL/L (ref 136–145)
WBC # BLD AUTO: 9.3 K/UL (ref 3.6–11)

## 2025-01-07 PROCEDURE — 99214 OFFICE O/P EST MOD 30 MIN: CPT

## 2025-01-07 PROCEDURE — 85027 COMPLETE CBC AUTOMATED: CPT

## 2025-01-07 PROCEDURE — 80053 COMPREHEN METABOLIC PANEL: CPT

## 2025-01-07 PROCEDURE — 6370000000 HC RX 637 (ALT 250 FOR IP): Performed by: OBSTETRICS & GYNECOLOGY

## 2025-01-07 PROCEDURE — 83880 ASSAY OF NATRIURETIC PEPTIDE: CPT

## 2025-01-07 PROCEDURE — 82570 ASSAY OF URINE CREATININE: CPT

## 2025-01-07 PROCEDURE — 36415 COLL VENOUS BLD VENIPUNCTURE: CPT

## 2025-01-07 PROCEDURE — 59025 FETAL NON-STRESS TEST: CPT

## 2025-01-07 PROCEDURE — G0378 HOSPITAL OBSERVATION PER HR: HCPCS

## 2025-01-07 PROCEDURE — G0379 DIRECT REFER HOSPITAL OBSERV: HCPCS

## 2025-01-07 PROCEDURE — 96374 THER/PROPH/DIAG INJ IV PUSH: CPT

## 2025-01-07 PROCEDURE — 6360000002 HC RX W HCPCS: Performed by: OBSTETRICS & GYNECOLOGY

## 2025-01-07 PROCEDURE — 84156 ASSAY OF PROTEIN URINE: CPT

## 2025-01-07 RX ORDER — PROCHLORPERAZINE EDISYLATE 5 MG/ML
10 INJECTION INTRAMUSCULAR; INTRAVENOUS EVERY 6 HOURS PRN
Status: DISCONTINUED | OUTPATIENT
Start: 2025-01-07 | End: 2025-01-08 | Stop reason: HOSPADM

## 2025-01-07 RX ORDER — ACETAMINOPHEN 500 MG
1000 TABLET ORAL EVERY 8 HOURS PRN
Status: DISCONTINUED | OUTPATIENT
Start: 2025-01-07 | End: 2025-01-08 | Stop reason: HOSPADM

## 2025-01-07 RX ORDER — ONDANSETRON 4 MG/1
4 TABLET, ORALLY DISINTEGRATING ORAL EVERY 8 HOURS PRN
Status: DISCONTINUED | OUTPATIENT
Start: 2025-01-07 | End: 2025-01-08 | Stop reason: HOSPADM

## 2025-01-07 RX ORDER — ONDANSETRON 2 MG/ML
4 INJECTION INTRAMUSCULAR; INTRAVENOUS EVERY 6 HOURS PRN
Status: DISCONTINUED | OUTPATIENT
Start: 2025-01-07 | End: 2025-01-08 | Stop reason: HOSPADM

## 2025-01-07 RX ORDER — NIFEDIPINE 30 MG/1
30 TABLET, EXTENDED RELEASE ORAL EVERY 12 HOURS
Status: DISCONTINUED | OUTPATIENT
Start: 2025-01-07 | End: 2025-01-08 | Stop reason: HOSPADM

## 2025-01-07 RX ORDER — NIFEDIPINE 30 MG/1
30 TABLET, EXTENDED RELEASE ORAL EVERY 12 HOURS
Qty: 60 TABLET | Refills: 3 | Status: SHIPPED | OUTPATIENT
Start: 2025-01-08

## 2025-01-07 RX ORDER — LABETALOL 200 MG/1
200 TABLET, FILM COATED ORAL EVERY 8 HOURS SCHEDULED
Status: DISCONTINUED | OUTPATIENT
Start: 2025-01-07 | End: 2025-01-08 | Stop reason: HOSPADM

## 2025-01-07 RX ORDER — PROCHLORPERAZINE MALEATE 5 MG/1
5 TABLET ORAL EVERY 6 HOURS PRN
Status: DISCONTINUED | OUTPATIENT
Start: 2025-01-07 | End: 2025-01-07

## 2025-01-07 RX ADMIN — ACETAMINOPHEN 1000 MG: 500 TABLET ORAL at 20:23

## 2025-01-07 RX ADMIN — NIFEDIPINE 30 MG: 30 TABLET, FILM COATED, EXTENDED RELEASE ORAL at 20:23

## 2025-01-07 RX ADMIN — PROCHLORPERAZINE EDISYLATE 10 MG: 5 INJECTION INTRAMUSCULAR; INTRAVENOUS at 20:26

## 2025-01-07 RX ADMIN — LABETALOL HYDROCHLORIDE 200 MG: 200 TABLET, FILM COATED ORAL at 21:12

## 2025-01-07 ASSESSMENT — PAIN DESCRIPTION - ORIENTATION: ORIENTATION: ANTERIOR;LOWER

## 2025-01-07 ASSESSMENT — PAIN DESCRIPTION - DESCRIPTORS: DESCRIPTORS: ACHING

## 2025-01-07 ASSESSMENT — PAIN SCALES - GENERAL: PAINLEVEL_OUTOF10: 7

## 2025-01-07 ASSESSMENT — PAIN - FUNCTIONAL ASSESSMENT: PAIN_FUNCTIONAL_ASSESSMENT: ACTIVITIES ARE NOT PREVENTED

## 2025-01-07 ASSESSMENT — PAIN DESCRIPTION - LOCATION: LOCATION: HEAD

## 2025-01-07 NOTE — CARE COORDINATION
Call within 2 business days of discharge: Yes     Patient Current Location: Home: [address] [place] [id number]    Last Discharge Facility       Date Complaint Diagnosis Description Type Department Provider    24 Hypertension  Admission (Discharged) [id number] Renee Santillan MD     Maternity Care Manager contacted the patient by telephone to discuss the maternity management program.  Patient agrees to care management services at this time. Verified name and  with patient as identifiers.     Risk Factors Identified:  HTN IN PREGNANCY      Needs to be reviewed by the provider   none         Method of communication with provider : none    Advance Care Planning:   Does patient have an Advance Directive:  not on file.     Does patient have OB/Gyn Selected? Yes    Discussed follow up appointments. If no appointment was previously scheduled, appointment scheduling offered: Yes  BS follow up appointment(s):   Future Appointments   Date Time Provider Department Center   1/10/2025  8:50 AM Renee Santillan MD [facility] BS AMB   1/10/2025  2:45 PM NURSE [facility] BS AMB   1/10/2025  3:15 PM ULTRASOUND 1 AMB [facility] BS AMB   2025  2:45 PM NURSE [facility] BS AMB   2025  3:15 PM ULTRASOUND 1 AMB [facility] BS AMB   2025  8:50 AM Renee Santillan MD [facility] BS AMB   2025  3:15 PM ULTRASOUND 1 AMB [facility] BS AMB   2025 12:00 PM Renee Santillan MD [facility] BS AMB   2/3/2025  2:10 PM Renee Santillan MD [facility] BS AMB   2025  8:50 AM Renee Santillan MD [facility] BS AMB     Non-[facility] follow up appointment(s): N/A    OB History:   OB History    Para Term  AB Living   2 1 1 0 0 1   SAB IAB Ectopic Molar Multiple Live Births             1      # Outcome Date GA Lbr Cruz/2nd Weight Sex Type Anes PTL Lv   2 Current            1 Term 21 37w0d  3.266 kg (7 lb 3.2 oz) F CS-LTranv EPI N MAEGAN       33w4d    Medication reconciliation was performed with

## 2025-01-08 NOTE — H&P
History & Physical    Name: Neeta Magdaleno MRN: [id number]  SSN: [id number]    YOB: 1990  Age: 34 y.o.  Sex: female        Subjective:   Chief Complaint: Headache  Estimated Date of Delivery: 25  OB History    Para Term  AB Living   2 1 1 0 0 1   SAB IAB Ectopic Molar Multiple Live Births             1      # Outcome Date GA Lbr Rcuz/2nd Weight Sex Type Anes PTL Lv   2 Current            1 Term 21 37w0d  3.266 kg (7 lb 3.2 oz) F CS-LTranv EPI N MAEGAN       Neeta Magdaleno, 34 y.o.,  ,  presents at 33w4d, complaining of Headache.  She somplains that she has not felt well all day.  She' had a headache, frontal and in her neck.  It has waxed and waned all day.  She's taken no medications.  He had beba photopsia, that resolved.  She has a history of migraines, but this feels different .  She's mild RUQ pain.  She complains of new onset facial edema.  She took BP's at home and work.  She's diastolics in the 90's.  She reports systolics from 135 to 155.  She developed contractions.  She then had some shortness of breath, that is better now.  She denies chest pain.      Prenatal course was complicated by chronic hypertension. Please see prenatal records for details.  Fetal growth has been good.        Ho cs, RCS, w BS: need email sent : r cs  w BS: email sent 2024 38 wk for chtn, approved 2024     Fh DS  FH autism  H14, NIPS  AMA: NT/FS  CHTN: disc asa, baseline labs and 24 hr urine  07/15/2024 - initial prenatal labs complete & normal  07/15/2024 - urine pro/cr ratio: 0.1  2024 - 24hr urine prot 248mg : MFM recs nephrology consult ; DISCUSS [facility]  2024 - nips low risk  2024 - early 1hr gtt WNL  2024 - afp neg  REPEAT C/S CATARINA. SALPINGECTOMY 25       Prior to Admission medications    Medication Sig Start Date End Date Taking? Authorizing Provider   ASPIRIN 81 PO Take by mouth   Yes Provider, MD Wilber   Cholecalciferol (VITAMIN D) 50 MCG (

## 2025-01-08 NOTE — PROGRESS NOTES
1835: patient arrived ambulatory with spouse from home for elevated BP, RUQ pain since 1630, HA all day, and contractions. She reports +FM and denies LOF and vaginal bleeding. Placed patient on EFM/TOCO and oriented to room.    1905: Bedside shift change report given to Genet PINEDA (oncoming nurse) by Bravo PINEDA (offgoing nurse). Report included the following information Nurse Handoff Report.     
1905: SBAR report given to this RN per CLAUDE Miller RN.     1928: This RN calling Greyson CRAFT to notify of pt arrival, CC, VS and labs recently sent by previous RN.     1942: Greyson CRAFT at pt bedside. MD performing pt assessment and discussing POC for observation of VS and awaiting results of ordered labs.     2005: Greyson CRAFT performing SVE: 0/50/High Posterior. MD to put in orders for medication to improve TORRES and BP. MD ROOT RN may remove EFM/TOCO after reviewing EFM/TOCO tracing.     [id number]: This RN and Greyson CRAFT at pt bedside. MD performing assessment and discussing lab results, and pt stating HA improved and now is 2/10 pain level. Pt requesting to be discharged home. MD verbalizing POC for discharge home and educating pt on Procardia prescription being sent to pharmacy. Pt and FOB verbalizing understanding and deny further questions or needs. MD ROOT RN does not need to replace BP cuff or EFM.     2211: RN at pt bedside re-educating pt on discharge instructions including HTN precautions, when to call MD, current week of pregnancy and fetal kick counts. Pt and FOB deny further needs.     2215: Pt ambulatory with steady gait off of unit at this time.           
  01/07/25 1847 -- -- -- -- -- 99 %   01/07/25 1846 139/87 -- -- 55 -- --     Physical Exam  Constitutional:       General: She is not in acute distress.  Neurological:      Mental Status: She is alert.       Recent Labs     01/07/25  1850   WBC 9.3   HGB 10.4*         K 4.1      CO2 22   BUN 10   CREATININE 0.53*   GLUCOSE 79   ALT 19   AST 15   PROBNP 75   PROCRERATURR 0.2     Principal Problem:    Pre-existing essential hypertension complicating pregnancy in third trimester  Active Problems:    33 weeks gestation of pregnancy  Resolved Problems:    * No resolved hospital problems. *    Patient feels better  Will discharge home  Add nifedipine xl 30 mg po BID

## 2025-01-10 ENCOUNTER — HOSPITAL ENCOUNTER (OUTPATIENT)
Facility: HOSPITAL | Age: 35
Setting detail: OBSERVATION
Discharge: HOME | End: 2025-01-11
Attending: OBSTETRICS & GYNECOLOGY | Admitting: OBSTETRICS & GYNECOLOGY
Payer: COMMERCIAL

## 2025-01-10 ENCOUNTER — ROUTINE PRENATAL (OUTPATIENT)
Age: 35
End: 2025-01-10

## 2025-01-10 VITALS
WEIGHT: 204.2 LBS | HEART RATE: 94 BPM | DIASTOLIC BLOOD PRESSURE: 86 MMHG | BODY MASS INDEX: 33.98 KG/M2 | SYSTOLIC BLOOD PRESSURE: 147 MMHG

## 2025-01-10 VITALS — SYSTOLIC BLOOD PRESSURE: 149 MMHG | DIASTOLIC BLOOD PRESSURE: 84 MMHG | HEART RATE: 67 BPM

## 2025-01-10 DIAGNOSIS — O10.013 PRE-EXISTING ESSENTIAL HYPERTENSION DURING PREGNANCY IN THIRD TRIMESTER: ICD-10-CM

## 2025-01-10 DIAGNOSIS — Z3A.34 34 WEEKS GESTATION OF PREGNANCY: Primary | ICD-10-CM

## 2025-01-10 DIAGNOSIS — O09.529 ANTEPARTUM MULTIGRAVIDA OF ADVANCED MATERNAL AGE: ICD-10-CM

## 2025-01-10 LAB
ALBUMIN SERPL-MCNC: 2.6 G/DL (ref 3.5–5)
ALBUMIN/GLOB SERPL: 0.9 (ref 1.1–2.2)
ALP SERPL-CCNC: 112 U/L (ref 45–117)
ALT SERPL-CCNC: 21 U/L (ref 12–78)
ANION GAP SERPL CALC-SCNC: 7 MMOL/L (ref 2–12)
AST SERPL-CCNC: 17 U/L (ref 15–37)
BILIRUB SERPL-MCNC: 0.3 MG/DL (ref 0.2–1)
BUN SERPL-MCNC: 14 MG/DL (ref 6–20)
BUN/CREAT SERPL: 19 (ref 12–20)
CALCIUM SERPL-MCNC: 8.6 MG/DL (ref 8.5–10.1)
CHLORIDE SERPL-SCNC: 111 MMOL/L (ref 97–108)
CO2 SERPL-SCNC: 22 MMOL/L (ref 21–32)
CREAT SERPL-MCNC: 0.74 MG/DL (ref 0.55–1.02)
CREAT UR-MCNC: 176 MG/DL
ERYTHROCYTE [DISTWIDTH] IN BLOOD BY AUTOMATED COUNT: 13.3 % (ref 11.5–14.5)
GLOBULIN SER CALC-MCNC: 3 G/DL (ref 2–4)
GLUCOSE SERPL-MCNC: 118 MG/DL (ref 65–100)
HCT VFR BLD AUTO: 29.3 % (ref 35–47)
HGB BLD-MCNC: 10.1 G/DL (ref 11.5–16)
MCH RBC QN AUTO: 30.2 PG (ref 26–34)
MCHC RBC AUTO-ENTMCNC: 34.5 G/DL (ref 30–36.5)
MCV RBC AUTO: 87.7 FL (ref 80–99)
NRBC # BLD: 0 K/UL (ref 0–0.01)
NRBC BLD-RTO: 0 PER 100 WBC
PLATELET # BLD AUTO: 167 K/UL (ref 150–400)
POTASSIUM SERPL-SCNC: 4.1 MMOL/L (ref 3.5–5.1)
PROT SERPL-MCNC: 5.6 G/DL (ref 6.4–8.2)
PROT UR-MCNC: 21 MG/DL (ref 0–11.9)
PROT/CREAT UR-RTO: 0.1
RBC # BLD AUTO: 3.34 M/UL (ref 3.8–5.2)
SODIUM SERPL-SCNC: 140 MMOL/L (ref 136–145)
WBC # BLD AUTO: 9 K/UL (ref 3.6–11)

## 2025-01-10 PROCEDURE — 85027 COMPLETE CBC AUTOMATED: CPT

## 2025-01-10 PROCEDURE — G0378 HOSPITAL OBSERVATION PER HR: HCPCS

## 2025-01-10 PROCEDURE — 82570 ASSAY OF URINE CREATININE: CPT

## 2025-01-10 PROCEDURE — 36415 COLL VENOUS BLD VENIPUNCTURE: CPT

## 2025-01-10 PROCEDURE — 6370000000 HC RX 637 (ALT 250 FOR IP): Performed by: FAMILY MEDICINE

## 2025-01-10 PROCEDURE — G0379 DIRECT REFER HOSPITAL OBSERV: HCPCS

## 2025-01-10 PROCEDURE — 0502F SUBSEQUENT PRENATAL CARE: CPT | Performed by: OBSTETRICS & GYNECOLOGY

## 2025-01-10 PROCEDURE — 80053 COMPREHEN METABOLIC PANEL: CPT

## 2025-01-10 PROCEDURE — 84156 ASSAY OF PROTEIN URINE: CPT

## 2025-01-10 RX ORDER — LABETALOL 200 MG/1
200 TABLET, FILM COATED ORAL EVERY 8 HOURS SCHEDULED
Status: DISCONTINUED | OUTPATIENT
Start: 2025-01-10 | End: 2025-01-11 | Stop reason: HOSPADM

## 2025-01-10 RX ADMIN — LABETALOL HYDROCHLORIDE 200 MG: 200 TABLET, FILM COATED ORAL at 21:36

## 2025-01-10 NOTE — PROGRESS NOTES
Report called to labor and delivery. Patient to have blood pressures monitored. Patient reports needing to go home to arrange  first and then will be coming back to labor and delivery. [facility] provider,  ok with patient's plan. Patient ambulated from clinic with no signs of distress.

## 2025-01-10 NOTE — PROGRESS NOTES
[facility]  [phone]  Renee Santillan MD, FACOG  [email or web address]      Routine follow-up OB visit     Chief Complaint   Patient presents with    Routine Prenatal Visit       Patient Active Problem List    Diagnosis Date Noted    Pre-existing essential hypertension complicating pregnancy in third trimester 2025    33 weeks gestation of pregnancy 2025    Hypertension affecting pregnancy in third trimester 2024    Pre-existing essential hypertension during pregnancy in third trimester 2024    Antepartum multigravida of advanced maternal age 07/15/2024     section wound seroma, postpartum 06/10/2021    Essential hypertension 2021    Sepsis (HCC) 2016    Depression 2014    ADD (attention deficit disorder) 2014       SUBJECTIVE:  Pt reports seen in LD for HA, ruq pain and UC  Was prescribed prn procardia.  Sees MFM today.  Did not start prn procardia.  Missed labetalol dose x1.   Busy at work.       OBJECTIVE:  Vitals:    01/10/25 0856   BP: (!) 147/86   Pulse:      See prenatal flowsheet    Physical Exam:  Gen: no acute distress, normal speech  Cardiac: appears warm and well perfused  Pulm: breathing comfortably on room air  Abd: soft, gravid, non-tender  Fundal height: see prenatal flowsheet  Ext: symmetric; moves all 4 extremities equally    Assessment:  34 y.o.  34w0d   Routine prenatal visit  Encounter Diagnoses   Name Primary?    34 weeks gestation of pregnancy Yes    Pre-existing essential hypertension during pregnancy in third trimester     Antepartum multigravida of advanced maternal age      Suspect SIPIH, with severe range BP at home  Plan:   Routine follow up OB appointment  Continue routine prenatal care   Routine OB instructions given appropriate to gestational age, see AVS  Rec modified bed rest for improved BP control monitoring and consistent medications.  Prefer

## 2025-01-10 NOTE — PROGRESS NOTES
Patient was seen 1/10/2025      Please look under media to view full consult and ultrasound report in [employer].         Cezar Snyder MD  Maternal Fetal Medicine

## 2025-01-10 NOTE — PROCEDURES
PATIENT: ABUNDIO CONTRERAS   -  : 1990   -  DOS:01/10/2025   -  INTERPRETING PROVIDER:Cezar Snyder,   Indication  ========    Chronic hypertension, History of pre-term delivery, Advanced Maternal Age, Obesity in pregnancy    Method  ======    External Fetal Monitor and transabdominal ultrasound examination. View: Sufficient    Pregnancy  =========    Degroot pregnancy. Number of fetuses: 1    Dating  ======    LMP on: 2024  GA by LMP 33 w + 3 d  CHARAN by LMP: 2025  Previous Ultrasound on: 7/15/2024  Type of prior assessment: GA  GA at prior assessment date 8 w + 3 d  GA by previous U/S 34 w + 0 d  CHARAN by previous Ultrasound: 2025  Assigned: based on ultrasound (GA), selected on 2024  Assigned GA 34 w + 0 d  Assigned CHARAN: 2025    General Evaluation  ==============    Cardiac activity present.  bpm. Fetal movements: visualized. Presentation: Cephalic  Placenta: Placental site: anterior, appropriate distance from the internal os  Umbilical cord: Cord vessels: 3 vessel cord    Fetal Anatomy  ===========    Stomach: normal  Kidneys: normal  Bladder: normal  Wants to know fetal sex: yes    Amniotic Fluid Assessment  =====================    Amount of AF: normal  MVP 5.7 cm. ROSE 17.9 cm. Q1 5.1 cm, Q2 5.7 cm, Q3 3.7 cm, Q4 3.4 cm    Non Stress Test  =============    NST interpretation: reactive. Test duration 20 min. Baseline  bpm. Baseline variability: moderate. Accelerations: present. Decelerations: absent. Uterine activity:  absent    Findings  =======    Intrauterine Degroot pregnancy at 34w 0d by clinical dates.  Amniotic fluid: normal.  Placenta is anterior, appropriate distance from the internal os.  Cephalic presentation.    NST is reactive. Modified BPP is normal.    The ultrasound findings as listed above and diagnostic limitations of ultrasound imaging, including inability to exclude all anomalies, have been reviewed with the patient. All  questions and concerns

## 2025-01-11 VITALS
HEART RATE: 76 BPM | RESPIRATION RATE: 16 BRPM | TEMPERATURE: 98.3 F | SYSTOLIC BLOOD PRESSURE: 123 MMHG | DIASTOLIC BLOOD PRESSURE: 73 MMHG | OXYGEN SATURATION: 97 %

## 2025-01-11 LAB
AMNISURE, POC: NEGATIVE
Lab: NORMAL
NEGATIVE QC PASS/FAIL: NORMAL
POSITIVE QC PASS/FAIL: NORMAL

## 2025-01-11 PROCEDURE — 99214 OFFICE O/P EST MOD 30 MIN: CPT

## 2025-01-11 PROCEDURE — G0378 HOSPITAL OBSERVATION PER HR: HCPCS

## 2025-01-11 NOTE — PROGRESS NOTES
2036: Patient presents to labor and delivery due to elevated blood pressures at home and being monitored closely by her OB and MFM.   2110: Dr. Esteves bedside for patient evaluation.  2345: Patient she felt a gush of fluid, Dr. Esteves notified, orders received to do an amnisure on pt.   0010: Amnisure negative, no fluid visualized to do nitrazine. Dr. Esteves updated.   0100: All lab results, BP's and FHR reviewed with Dr. Esteves.   0106: Dr. Rubi and Dr. Esteves bedside to discuss all labs and BP's with patient. Both physicians discussed BP's with patient and labs. Dr. Rubi and Dr. Esteves recommending pt to add procardia. Dr. Rubi and Dr. Esteves stated patient can be discharged home. Both MD's stated that patient can stay overnight and see someone from her physicians practice in the morning if she would like. Patient states she is comfortable going home and would prefer to go home.   0200: All of discharge instructions provided to patient. Patient aware for reasons to call physician/911 or when to return to hospital. Patient ambulated off unit, discharged, undelivered in stable condition.

## 2025-01-11 NOTE — DISCHARGE INSTRUCTIONS
Patient Discharge Instructions    Neeta Magdaleno / [id number] : 1990    Admitted 1/10/2025 Discharged: 2025       Please bring this form with you to show your care provider at your follow-up appointment.    Primary care provider:  Dr. Santillan     Discharging provider:  Jackeline Esteves DO  - Family Medicine/OB Attending     ACUTE DIAGNOSES:  No admission diagnoses are documented for this encounter.    Current Discharge Medication List        CONTINUE these medications which have NOT CHANGED    Details   NIFEdipine (PROCARDIA XL) 30 MG extended release tablet Take 1 tablet by mouth every 12 hours  Qty: 60 tablet, Refills: 3      ASPIRIN 81 PO Take by mouth      Cholecalciferol (VITAMIN D) 50 MCG ( UT) CAPS capsule Take by mouth      Prenatal Vit-Fe Fumarate-FA (PRENATAL VITAMIN PO) Take 1 tablet by mouth daily      buPROPion (WELLBUTRIN XL) 150 MG extended release tablet Take 1 tablet by mouth      labetalol (NORMODYNE) 200 MG tablet Take 1 tablet by mouth in the morning, at noon, and at bedtime ceived the following from [facility]: Outside name: labetaloL (NORMODYNE) 200 mg tablet             FOLLOW-UP CARE RECOMMENDATIONS:    Specific symptoms to watch for: chest pain, shortness of breath, fever, chills, nausea, vomiting, diarrhea, change in mentation, falling, weakness, bleeding, headache, vision changes, elevated blood pressure      DIET/what to eat:  Regular Diet    ACTIVITY:   Activity Instructions    As previously recommended by OB         I understand that if any problems occur once I am at home I am to contact my physician.    These instructions were explained to me and I had the opportunity to ask questions.    I understand and acknowledge receipt of the instructions indicated above.                                                                                                                                               Physician's or R.N.'s Signature

## 2025-01-11 NOTE — H&P
History & Physical    Name: Neeta Magdaleno MRN: [id number]  SSN: [id number]    YOB: 1990  Age: 34 y.o.  Sex: female        Subjective:     Estimated Date of Delivery: 25  OB History    Para Term  AB Living   2 1 1 0 0 1   SAB IAB Ectopic Molar Multiple Live Births             1      # Outcome Date GA Lbr Cruz/2nd Weight Sex Type Anes PTL Lv   2 Current            1 Term 21 37w0d  3.266 kg (7 lb 3.2 oz) F CS-LTranv EPI N MAEGAN     I am a second provider covering for [facility] today and have assumed care of the patient.  I have introduced myself to the patient and her family.    Ms. Magdaleno is a 34 y.o.  seen with pregnancy at 34w0d for PIH evaluation. Patient says her Bps have been higher than usual at home.  She has chronic and frequent headaches but none currently and denies change in vision or RUQ pain.  She has had midepigastric pain from reflux.  She has been compliant with her labetalol 200 tid, but has not been taking the Procardia 30mg BID prescribed by OB 3 days ago (says OB told her not to take it as to not mask preE.)  Patient reports good fetal movement. Patient denies ROM, VB, contractions, fever, CP, SOB, nausea/vomiting, RUQ pain, LE edema, and calf tenderness/pain. Prenatal course complicated by chronic hypertension, ulcertive colitis, preDM, psych disorder. Please see prenatal records for details.    Past Medical History:   Diagnosis Date    ADD (attention deficit disorder)     Auditory processing disorder     Breast lump in female     Endorses provider felt left breast lump in , pt denies ever feeling breast lump at anytime    Colitis, ulcerative (HCC)     Diabetes (HCC)     pre-diabetes, cleared by PCP     Heart abnormality     16 yo, heart skipped beats    Hypertension     Learning disability     Migraines     Motor skill disorder     Pap smear for cervical cancer screening 2019; 10/5/21    Negative, HPV negative; neg/hpv neg    Pap smear for  PO Take by mouth    Provider, MD Wilber   Cholecalciferol (VITAMIN D) 50 MCG ( UT) CAPS capsule Take by mouth    ProviderWilber MD   Prenatal Vit-Fe Fumarate-FA (PRENATAL VITAMIN PO) Take 1 tablet by mouth daily    ProviderWilber MD   buPROPion (WELLBUTRIN XL) 150 MG extended release tablet Take 1 tablet by mouth 20   Automatic Reconciliation, Ar   labetalol (NORMODYNE) 200 MG tablet Take 1 tablet by mouth in the morning, at noon, and at bedtime ceived the following from [facility]: Outside name: labetaloL (NORMODYNE) 200 mg tablet 8/3/21   Automatic Reconciliation, Ar          Objective:     Vitals:  There were no vitals filed for this visit.     Physical Exam:  Gen: NAD, A&O  HEENT: normocephalic, atraumatic  CV: RRR, no m/r/g  Lungs: CTAB, no w/r/r  Abd: gravid, NT, no rebound or guarding, no RUQ ttp   Ext: no pedal edema   Neuro: 2+ DTR, no clonus   Psych: normal mood and affect   Cervical Exam:  deferred   Fetal Heart Rate: 140s mod augustin, +accels, 2 variable decels   TOCO: no ctx     Prenatal Labs (notable): baseline UPC 0.1 2024, RH pos, passed glucola, NIPT low risk      Assessment/Plan:     Ms. Magdaleno is a  seen with pregnancy at 34w0d for worsening chronic HTN  Chronic HTN, rule out super imposed: Bps mild range here but apparently higher at home, she's asymptomatic, will send PIH labs now and cycle pressures.  Continue labetalol 200 tid.  If she rules out for superimposed then this is worsening chronic HTN and will start Procardia 30mg BID.    Hx CS: x1, scheduled for repeat on      Addendum 01:40  PIH labs all negative including UPC 0.1.  Bps all nonsevere.  This is worsening chronic HTN, common in 3rd trimester, but not superimposed at this time.  I recommend tighter blood pressure control of HTN - will add Procardia 30mg XL BID.  Has follow up within a week and will continue home monitoring.    Jackeline Esteves DO  (Covering for [facility])

## 2025-01-11 NOTE — RESULT ENCOUNTER NOTE
Pt s/p MFM US and consultation.  MFM report reviewed by Renee Santillan MD.   Plan [facility] follow up recommendations.  Pt went to LD and was discharged for outpt fu

## 2025-01-13 ENCOUNTER — ROUTINE PRENATAL (OUTPATIENT)
Age: 35
End: 2025-01-13

## 2025-01-13 ENCOUNTER — HOSPITAL ENCOUNTER (INPATIENT)
Facility: HOSPITAL | Age: 35
LOS: 7 days | Discharge: HOME | End: 2025-01-20
Attending: OBSTETRICS & GYNECOLOGY | Admitting: OBSTETRICS & GYNECOLOGY
Payer: COMMERCIAL

## 2025-01-13 VITALS
SYSTOLIC BLOOD PRESSURE: 142 MMHG | HEART RATE: 83 BPM | DIASTOLIC BLOOD PRESSURE: 93 MMHG | WEIGHT: 201.2 LBS | BODY MASS INDEX: 33.48 KG/M2

## 2025-01-13 DIAGNOSIS — O09.529 ANTEPARTUM MULTIGRAVIDA OF ADVANCED MATERNAL AGE: Primary | Chronic | ICD-10-CM

## 2025-01-13 DIAGNOSIS — O10.013 PRE-EXISTING ESSENTIAL HYPERTENSION DURING PREGNANCY IN THIRD TRIMESTER: ICD-10-CM

## 2025-01-13 DIAGNOSIS — Z3A.34 34 WEEKS GESTATION OF PREGNANCY: ICD-10-CM

## 2025-01-13 DIAGNOSIS — Z98.891 S/P C-SECTION: Primary | ICD-10-CM

## 2025-01-13 LAB
ABO + RH BLD: NORMAL
ALBUMIN SERPL-MCNC: 2.7 G/DL (ref 3.5–5)
ALBUMIN/GLOB SERPL: 0.7 (ref 1.1–2.2)
ALP SERPL-CCNC: 130 U/L (ref 45–117)
ALT SERPL-CCNC: 23 U/L (ref 12–78)
ANION GAP SERPL CALC-SCNC: 6 MMOL/L (ref 2–12)
AST SERPL-CCNC: 16 U/L (ref 15–37)
BASOPHILS # BLD: 0.03 K/UL (ref 0–0.1)
BASOPHILS NFR BLD: 0.3 % (ref 0–1)
BILIRUB SERPL-MCNC: 0.5 MG/DL (ref 0.2–1)
BLOOD GROUP ANTIBODIES SERPL: NORMAL
BUN SERPL-MCNC: 9 MG/DL (ref 6–20)
BUN/CREAT SERPL: 16 (ref 12–20)
CALCIUM SERPL-MCNC: 9.7 MG/DL (ref 8.5–10.1)
CHLORIDE SERPL-SCNC: 107 MMOL/L (ref 97–108)
CO2 SERPL-SCNC: 23 MMOL/L (ref 21–32)
COMMENT:: NORMAL
CREAT SERPL-MCNC: 0.57 MG/DL (ref 0.55–1.02)
CREAT UR-MCNC: 82 MG/DL
DIFFERENTIAL METHOD BLD: ABNORMAL
EOSINOPHIL # BLD: 0.2 K/UL (ref 0–0.4)
EOSINOPHIL NFR BLD: 2.1 % (ref 0–7)
ERYTHROCYTE [DISTWIDTH] IN BLOOD BY AUTOMATED COUNT: 13.4 % (ref 11.5–14.5)
GLOBULIN SER CALC-MCNC: 3.9 G/DL (ref 2–4)
GLUCOSE SERPL-MCNC: 83 MG/DL (ref 65–100)
HCT VFR BLD AUTO: 32.8 % (ref 35–47)
HGB BLD-MCNC: 11.2 G/DL (ref 11.5–16)
IMM GRANULOCYTES # BLD AUTO: 0.1 K/UL (ref 0–0.04)
IMM GRANULOCYTES NFR BLD AUTO: 1.1 % (ref 0–0.5)
LYMPHOCYTES # BLD: 1.4 K/UL (ref 0.8–3.5)
LYMPHOCYTES NFR BLD: 14.9 % (ref 12–49)
MCH RBC QN AUTO: 30 PG (ref 26–34)
MCHC RBC AUTO-ENTMCNC: 34.1 G/DL (ref 30–36.5)
MCV RBC AUTO: 87.9 FL (ref 80–99)
MONOCYTES # BLD: 0.79 K/UL (ref 0–1)
MONOCYTES NFR BLD: 8.4 % (ref 5–13)
NEUTS SEG # BLD: 6.86 K/UL (ref 1.8–8)
NEUTS SEG NFR BLD: 73.2 % (ref 32–75)
NRBC # BLD: 0 K/UL (ref 0–0.01)
NRBC BLD-RTO: 0 PER 100 WBC
PLATELET # BLD AUTO: 182 K/UL (ref 150–400)
POTASSIUM SERPL-SCNC: 4.2 MMOL/L (ref 3.5–5.1)
PROT SERPL-MCNC: 6.6 G/DL (ref 6.4–8.2)
PROT UR-MCNC: 19 MG/DL (ref 0–11.9)
PROT/CREAT UR-RTO: 0.2
RBC # BLD AUTO: 3.73 M/UL (ref 3.8–5.2)
SODIUM SERPL-SCNC: 136 MMOL/L (ref 136–145)
SPECIMEN EXP DATE BLD: NORMAL
SPECIMEN HOLD: NORMAL
SPECIMEN HOLD: NORMAL
URATE SERPL-MCNC: 4.7 MG/DL (ref 2.6–6)
WBC # BLD AUTO: 9.4 K/UL (ref 3.6–11)

## 2025-01-13 PROCEDURE — 36415 COLL VENOUS BLD VENIPUNCTURE: CPT

## 2025-01-13 PROCEDURE — 59025 FETAL NON-STRESS TEST: CPT | Performed by: OBSTETRICS & GYNECOLOGY

## 2025-01-13 PROCEDURE — 85025 COMPLETE CBC W/AUTO DIFF WBC: CPT

## 2025-01-13 PROCEDURE — 80053 COMPREHEN METABOLIC PANEL: CPT

## 2025-01-13 PROCEDURE — 86901 BLOOD TYPING SEROLOGIC RH(D): CPT

## 2025-01-13 PROCEDURE — 6360000002 HC RX W HCPCS: Performed by: OBSTETRICS & GYNECOLOGY

## 2025-01-13 PROCEDURE — 82570 ASSAY OF URINE CREATININE: CPT

## 2025-01-13 PROCEDURE — 87081 CULTURE SCREEN ONLY: CPT

## 2025-01-13 PROCEDURE — 0502F SUBSEQUENT PRENATAL CARE: CPT | Performed by: OBSTETRICS & GYNECOLOGY

## 2025-01-13 PROCEDURE — 99221 1ST HOSP IP/OBS SF/LOW 40: CPT | Performed by: OBSTETRICS & GYNECOLOGY

## 2025-01-13 PROCEDURE — 86780 TREPONEMA PALLIDUM: CPT

## 2025-01-13 PROCEDURE — 84550 ASSAY OF BLOOD/URIC ACID: CPT

## 2025-01-13 PROCEDURE — 1100000000 HC RM PRIVATE

## 2025-01-13 PROCEDURE — 84156 ASSAY OF PROTEIN URINE: CPT

## 2025-01-13 PROCEDURE — 86900 BLOOD TYPING SEROLOGIC ABO: CPT

## 2025-01-13 PROCEDURE — 6370000000 HC RX 637 (ALT 250 FOR IP): Performed by: OBSTETRICS & GYNECOLOGY

## 2025-01-13 PROCEDURE — 2580000003 HC RX 258: Performed by: OBSTETRICS & GYNECOLOGY

## 2025-01-13 PROCEDURE — 86850 RBC ANTIBODY SCREEN: CPT

## 2025-01-13 RX ORDER — CALCIUM CARBONATE 500 MG/1
500 TABLET, CHEWABLE ORAL 3 TIMES DAILY PRN
Status: DISCONTINUED | OUTPATIENT
Start: 2025-01-13 | End: 2025-01-15

## 2025-01-13 RX ORDER — LABETALOL 200 MG/1
200 TABLET, FILM COATED ORAL 3 TIMES DAILY
Qty: 60 TABLET | Refills: 0 | Status: CANCELLED | OUTPATIENT
Start: 2025-01-13

## 2025-01-13 RX ORDER — LABETALOL HYDROCHLORIDE 5 MG/ML
80 INJECTION, SOLUTION INTRAVENOUS
Status: ACTIVE | OUTPATIENT
Start: 2025-01-13 | End: 2025-01-14

## 2025-01-13 RX ORDER — BETAMETHASONE SODIUM PHOSPHATE AND BETAMETHASONE ACETATE 3; 3 MG/ML; MG/ML
12 INJECTION, SUSPENSION INTRA-ARTICULAR; INTRALESIONAL; INTRAMUSCULAR; SOFT TISSUE EVERY 24 HOURS
Status: COMPLETED | OUTPATIENT
Start: 2025-01-13 | End: 2025-01-14

## 2025-01-13 RX ORDER — SODIUM CHLORIDE 9 MG/ML
INJECTION, SOLUTION INTRAVENOUS PRN
Status: DISCONTINUED | OUTPATIENT
Start: 2025-01-13 | End: 2025-01-20 | Stop reason: HOSPADM

## 2025-01-13 RX ORDER — LABETALOL 200 MG/1
200 TABLET, FILM COATED ORAL 3 TIMES DAILY
Status: DISCONTINUED | OUTPATIENT
Start: 2025-01-13 | End: 2025-01-20 | Stop reason: HOSPADM

## 2025-01-13 RX ORDER — BUPROPION HYDROCHLORIDE 150 MG/1
150 TABLET ORAL DAILY
Status: DISCONTINUED | OUTPATIENT
Start: 2025-01-14 | End: 2025-01-20 | Stop reason: HOSPADM

## 2025-01-13 RX ORDER — SODIUM CHLORIDE 0.9 % (FLUSH) 0.9 %
5-40 SYRINGE (ML) INJECTION PRN
Status: DISCONTINUED | OUTPATIENT
Start: 2025-01-13 | End: 2025-01-20 | Stop reason: HOSPADM

## 2025-01-13 RX ORDER — BETAMETHASONE SODIUM PHOSPHATE AND BETAMETHASONE ACETATE 3; 3 MG/ML; MG/ML
12 INJECTION, SUSPENSION INTRA-ARTICULAR; INTRALESIONAL; INTRAMUSCULAR; SOFT TISSUE DAILY
Status: DISCONTINUED | OUTPATIENT
Start: 2025-01-13 | End: 2025-01-13

## 2025-01-13 RX ORDER — SODIUM CHLORIDE, SODIUM LACTATE, POTASSIUM CHLORIDE, CALCIUM CHLORIDE 600; 310; 30; 20 MG/100ML; MG/100ML; MG/100ML; MG/100ML
INJECTION, SOLUTION INTRAVENOUS CONTINUOUS
Status: DISCONTINUED | OUTPATIENT
Start: 2025-01-13 | End: 2025-01-17

## 2025-01-13 RX ORDER — SODIUM CHLORIDE 0.9 % (FLUSH) 0.9 %
5-40 SYRINGE (ML) INJECTION EVERY 12 HOURS SCHEDULED
Status: DISCONTINUED | OUTPATIENT
Start: 2025-01-13 | End: 2025-01-20 | Stop reason: HOSPADM

## 2025-01-13 RX ORDER — ASPIRIN 81 MG/1
81 TABLET ORAL DAILY
Status: DISCONTINUED | OUTPATIENT
Start: 2025-01-14 | End: 2025-01-20 | Stop reason: HOSPADM

## 2025-01-13 RX ORDER — ACETAMINOPHEN 325 MG/1
650 TABLET ORAL EVERY 4 HOURS PRN
Status: DISCONTINUED | OUTPATIENT
Start: 2025-01-13 | End: 2025-01-20 | Stop reason: HOSPADM

## 2025-01-13 RX ORDER — SODIUM CHLORIDE, SODIUM LACTATE, POTASSIUM CHLORIDE, AND CALCIUM CHLORIDE .6; .31; .03; .02 G/100ML; G/100ML; G/100ML; G/100ML
500 INJECTION, SOLUTION INTRAVENOUS ONCE
Status: COMPLETED | OUTPATIENT
Start: 2025-01-13 | End: 2025-01-17

## 2025-01-13 RX ORDER — VITAMIN B COMPLEX
2000 TABLET ORAL DAILY
Status: DISCONTINUED | OUTPATIENT
Start: 2025-01-14 | End: 2025-01-20 | Stop reason: HOSPADM

## 2025-01-13 RX ORDER — HYDRALAZINE HYDROCHLORIDE 20 MG/ML
10 INJECTION INTRAMUSCULAR; INTRAVENOUS
Status: ACTIVE | OUTPATIENT
Start: 2025-01-13 | End: 2025-01-14

## 2025-01-13 RX ORDER — METOCLOPRAMIDE HYDROCHLORIDE 5 MG/ML
10 INJECTION INTRAMUSCULAR; INTRAVENOUS EVERY 6 HOURS
Status: DISCONTINUED | OUTPATIENT
Start: 2025-01-13 | End: 2025-01-20 | Stop reason: HOSPADM

## 2025-01-13 RX ORDER — LABETALOL 200 MG/1
200 TABLET, FILM COATED ORAL 3 TIMES DAILY
Status: DISCONTINUED | OUTPATIENT
Start: 2025-01-13 | End: 2025-01-13

## 2025-01-13 RX ORDER — LABETALOL HYDROCHLORIDE 5 MG/ML
40 INJECTION, SOLUTION INTRAVENOUS
Status: ACTIVE | OUTPATIENT
Start: 2025-01-13 | End: 2025-01-14

## 2025-01-13 RX ORDER — LABETALOL HYDROCHLORIDE 5 MG/ML
20 INJECTION, SOLUTION INTRAVENOUS
Status: ACTIVE | OUTPATIENT
Start: 2025-01-13 | End: 2025-01-14

## 2025-01-13 RX ADMIN — LABETALOL HYDROCHLORIDE 200 MG: 200 TABLET, FILM COATED ORAL at 20:51

## 2025-01-13 RX ADMIN — ACETAMINOPHEN 650 MG: 325 TABLET ORAL at 17:46

## 2025-01-13 RX ADMIN — ACETAMINOPHEN 650 MG: 325 TABLET ORAL at 13:39

## 2025-01-13 RX ADMIN — LABETALOL HYDROCHLORIDE 200 MG: 200 TABLET, FILM COATED ORAL at 13:09

## 2025-01-13 RX ADMIN — ANTACID TABLETS 500 MG: 500 TABLET, CHEWABLE ORAL at 20:04

## 2025-01-13 RX ADMIN — BETAMETHASONE ACETATE AND BETAMETHASONE SODIUM PHOSPHATE 12 MG: 3; 3 INJECTION, SUSPENSION INTRA-ARTICULAR; INTRALESIONAL; INTRAMUSCULAR; SOFT TISSUE at 13:10

## 2025-01-13 RX ADMIN — SODIUM CHLORIDE, POTASSIUM CHLORIDE, SODIUM LACTATE AND CALCIUM CHLORIDE: 600; 310; 30; 20 INJECTION, SOLUTION INTRAVENOUS at 13:16

## 2025-01-13 RX ADMIN — METOCLOPRAMIDE 10 MG: 5 INJECTION, SOLUTION INTRAMUSCULAR; INTRAVENOUS at 20:53

## 2025-01-13 ASSESSMENT — PAIN DESCRIPTION - LOCATION
LOCATION: HEAD
LOCATION: HEAD

## 2025-01-13 ASSESSMENT — PAIN DESCRIPTION - DESCRIPTORS
DESCRIPTORS: ACHING
DESCRIPTORS: ACHING

## 2025-01-13 ASSESSMENT — PAIN SCALES - GENERAL: PAINLEVEL_OUTOF10: 4

## 2025-01-13 ASSESSMENT — PAIN - FUNCTIONAL ASSESSMENT: PAIN_FUNCTIONAL_ASSESSMENT: ACTIVITIES ARE NOT PREVENTED

## 2025-01-13 NOTE — H&P
[facility]  [facility]  [email or web address]  [phone]    Renee Santillan MD, FACOG     Labor and Delivery History & Physical  Template updated 2023    Name: Neeta Magdaleno MRN: [id number]  SSN: [id number]    YOB: 1990  Age: 34 y.o.  Sex: female        Subjective:     Estimated Date of Delivery: 25  OB History    Para Term  AB Living   2 1 1 0 0 1   SAB IAB Ectopic Molar Multiple Live Births             1      # Outcome Date GA Lbr Cruz/2nd Weight Sex Type Anes PTL Lv   2 Current            1 Term 21 37w0d  3.266 kg (7 lb 3.2 oz) F CS-LTranv EPI N MAEGAN         Ms. Magdaleno is arrived to L and D from the office with a pregnancy at 34w3d for  concerns for SIPIH .   Prenatal course was complicated by  CHTN requiring increasing antihypertensive medication .   Pt with severe range BP  on home BP log.    She also co swelling under left arm for several days.    Please see prenatal records for details.   Patient Active Problem List    Diagnosis Date Noted    Pre-existing essential hypertension complicating pregnancy in third trimester 2025    33 weeks gestation of pregnancy 2025    Hypertension affecting pregnancy in third trimester 2024    Pre-existing essential hypertension during pregnancy in third trimester 2024    Antepartum multigravida of advanced maternal age 07/15/2024     section wound seroma, postpartum 06/10/2021    Essential hypertension 2021    Sepsis (HCC) 2016    Depression 2014    ADD (attention deficit disorder) 2014         Patient reports mild HA that is improving, irregular UC, no vaginal bleeding, vaginal discharge, loss of fluid.  Good FM    Patient denies chest pain, SOB, RUQ pain    Past Medical History:   Diagnosis Date    ADD (attention deficit disorder)     Auditory processing disorder     Breast lump in female     Endorses provider felt left breast lump in

## 2025-01-13 NOTE — PROGRESS NOTES
Rooming note, OB problem visit    Chief Complaint   Patient presents with    Discuss BP Meds    Pregnancy Problem     Neeta Magdaleno is a 34 y.o. female presents for a problem visit.    34w3d    The patient is reporting having: wanting to discuss BP meds, see  message attachments for BP readings.     Patient MC message from this morning:   \"Good morning,     As requested by Dr. Santillan and the prenatal specialist I went down to L&D on Friday evening after making childcare arrangements for my daughter.  I had a similar experience to last Tuesday. I met with oncall doctors Dr. Esteves and Dr. Bauman both who said my blood work did not reflect a diagnosis of super imposed pre-eclampsia and encouraged me again to take the prn prescription of Nifedipine ER osmotic relea 30 in between dosages of my 3 times a day labetalol  mg 3 up to twice a day. Dr. Santillan on friday made it clear to me she did not want me to take the prn concerned it would cover up symptoms if I were to develop super imposed pre-eclampsia; I have not taken the medication.  Instead upon being discharged Saturday morning at around 2:00 am during the falling snow I made my slow way home ( I drove myself to and from [facility]) to discuss with my  our options. Having been taken out of work and informed I'd likely be admitted until our son is born we were both taken by surprise that I had been discharged. As a couple we chose that not taking the PRN over the weekend would be best and instead record my BP readings. My BP has been up amd down over the weekend despite taking my labetalol ever 8 hours and remaining off my feet the majority of the day. Pictures of my readings have been included via attachments.     Please advise what my next steps are. I'd like to come in to meet with Dr. Santillan ideally today if possible.     Thank you,  Neeta\"    453am took BP meds    This is not a new problem.  This is not a routinely scheduled follow up OB visit.  She has

## 2025-01-13 NOTE — PROGRESS NOTES
[facility]  [phone]  Renee Santillan MD, FACOG  [email or web address]      Routine follow-up OB visit     Chief Complaint   Patient presents with    Discuss BP Meds    Pregnancy Problem       Patient Active Problem List    Diagnosis Date Noted    Pre-existing essential hypertension complicating pregnancy in third trimester 2025    33 weeks gestation of pregnancy 2025    Hypertension affecting pregnancy in third trimester 2024    Pre-existing essential hypertension during pregnancy in third trimester 2024    Antepartum multigravida of advanced maternal age 07/15/2024     section wound seroma, postpartum 06/10/2021    Essential hypertension 2021    Sepsis (HCC) 2016    Depression 2014    ADD (attention deficit disorder) 2014       SUBJECTIVE:  Pt reports elevated bp at home see log  Was sent home from LD early Saturday am.       OBJECTIVE:  Vitals:    25 1036   BP: (!) 142/93   Pulse: 83     See prenatal flowsheet    Physical Exam:  Gen: no acute distress, normal speech  Cardiac: appears warm and well perfused  Pulm: breathing comfortably on room air  Abd: soft, gravid, non-tender  Fundal height: see prenatal flowsheet  Ext: symmetric; moves all 4 extremities equally    Assessment:  34 y.o.  34w3d   Routine prenatal visit  Encounter Diagnoses   Name Primary?    Antepartum multigravida of advanced maternal age Yes    Pre-existing essential hypertension during pregnancy in third trimester     34 weeks gestation of pregnancy      Chtn concerns for SIPIH requiring inc antihypertensives    Plan:   Routine follow up OB appointment  Continue routine prenatal care   Routine OB instructions given appropriate to gestational age, see AVS  Disc w MFM:  Dr. Snyder who saw pt on Friday  He agrees heading towards delivery and at least inpatient admission with steroids.  Disc rba of PTD ,

## 2025-01-14 ENCOUNTER — CARE COORDINATION (OUTPATIENT)
Dept: OTHER | Facility: CLINIC | Age: 35
End: 2025-01-14

## 2025-01-14 LAB
COLLECT DURATION TIME UR: 24 HR
CREAT 24H UR-MRATE: 1701 MG/24HR (ref 600–1800)
PROT 24H UR-MRATE: 296 MG/24HR
PROT/CREAT 24H UR-RTO: 0.17 RATIO
SPECIMEN VOL ?TM UR: 2280 ML

## 2025-01-14 PROCEDURE — 6370000000 HC RX 637 (ALT 250 FOR IP): Performed by: STUDENT IN AN ORGANIZED HEALTH CARE EDUCATION/TRAINING PROGRAM

## 2025-01-14 PROCEDURE — 6360000002 HC RX W HCPCS: Performed by: OBSTETRICS & GYNECOLOGY

## 2025-01-14 PROCEDURE — 76819 FETAL BIOPHYS PROFIL W/O NST: CPT | Performed by: OBSTETRICS & GYNECOLOGY

## 2025-01-14 PROCEDURE — 1100000000 HC RM PRIVATE

## 2025-01-14 PROCEDURE — 99221 1ST HOSP IP/OBS SF/LOW 40: CPT | Performed by: OBSTETRICS & GYNECOLOGY

## 2025-01-14 PROCEDURE — 6370000000 HC RX 637 (ALT 250 FOR IP): Performed by: OBSTETRICS & GYNECOLOGY

## 2025-01-14 RX ORDER — SWAB
1 SWAB, NON-MEDICATED MISCELLANEOUS DAILY
Status: DISCONTINUED | OUTPATIENT
Start: 2025-01-14 | End: 2025-01-20 | Stop reason: HOSPADM

## 2025-01-14 RX ADMIN — LABETALOL HYDROCHLORIDE 200 MG: 200 TABLET, FILM COATED ORAL at 20:56

## 2025-01-14 RX ADMIN — Medication 1 TABLET: at 13:09

## 2025-01-14 RX ADMIN — ASPIRIN 81 MG: 81 TABLET, COATED ORAL at 08:18

## 2025-01-14 RX ADMIN — LABETALOL HYDROCHLORIDE 200 MG: 200 TABLET, FILM COATED ORAL at 08:18

## 2025-01-14 RX ADMIN — BUPROPION HYDROCHLORIDE 150 MG: 150 TABLET, EXTENDED RELEASE ORAL at 08:18

## 2025-01-14 RX ADMIN — Medication 2000 UNITS: at 08:18

## 2025-01-14 RX ADMIN — ANTACID TABLETS 500 MG: 500 TABLET, CHEWABLE ORAL at 20:56

## 2025-01-14 RX ADMIN — LABETALOL HYDROCHLORIDE 200 MG: 200 TABLET, FILM COATED ORAL at 16:10

## 2025-01-14 RX ADMIN — BETAMETHASONE ACETATE AND BETAMETHASONE SODIUM PHOSPHATE 12 MG: 3; 3 INJECTION, SUSPENSION INTRA-ARTICULAR; INTRALESIONAL; INTRAMUSCULAR; SOFT TISSUE at 16:05

## 2025-01-14 NOTE — PROCEDURES
PATIENT: ABUNDIO CONTRERAS   -  : 1990   -  DOS:2025   -  INTERPRETING PROVIDER:Cezar Snyder,   Indication  ========    Chronic hypertension, History of pre-term delivery, Advanced Maternal Age, Obesity in pregnancy    Method  ======    Transabdominal ultrasound examination. View: Sufficient    Pregnancy  =========    Degroot pregnancy. Number of fetuses: 1    Dating  ======    LMP on: 2024  GA by LMP 34 w + 0 d  CHARAN by LMP: 2025  Previous Ultrasound on: 7/15/2024  Type of prior assessment: GA  GA at prior assessment date 8 w + 3 d  GA by previous U/S 34 w + 4 d  CHARAN by previous Ultrasound: 2025  Assigned: based on ultrasound (GA), selected on 2024  Assigned GA 34 w + 4 d  Assigned CHARAN: 2025    General Evaluation  ==============    Cardiac activity present.  bpm. Fetal movements: visualized. Presentation: Cephalic  Placenta: Placental site: anterior, appropriate distance from the internal os  Umbilical cord: Cord vessels: 3 vessel cord    Fetal Anatomy  ===========    Stomach: normal  Kidneys: normal  Bladder: normal  Wants to know fetal sex: yes    Amniotic Fluid Assessment  =====================    Amount of AF: normal  MVP 3.1 cm. ROSE 8.5 cm. Q1 2.7 cm, Q2 2.6 cm, Q3 3.1 cm, Q4 0.0 cm    Biophysical Profile  ==============    2: Fetal breathing movements  2: Gross body movements  2: Fetal tone  2: Amniotic fluid volume  8/8 Biophysical profile score    Findings  =======    Intrauterine Degroot pregnancy at 34w 4d by clinical dates.  Amniotic fluid: normal.  Placenta is anterior, appropriate distance from the internal os.  Cephalic presentation.  Biophysical profile score is 8/8.    The ultrasound findings as listed above and diagnostic limitations of ultrasound imaging, including inability to exclude all anomalies, have been reviewed with the patient. All  questions and concerns addressed.    Consultation  ==========    Please see EPIC EMR

## 2025-01-14 NOTE — CARE COORDINATION
2025  2:08 PM    Care Management Progress Note    Reason for Admission:   Hypertension affecting pregnancy in third trimester [O16.3]    Patient Admission Status: Inpatient    Transition of care plan:    MAN is , admitted for chronic hypertension with superimposed severe preeclampsia 34w4d     CM met with MAN to complete initial assessment and begin discharge planning.  MOB verified and confirmed demographics.  MAN lives with FOB and her 3yr old, at the address on file. MAN reports she has good family support, and feels like she has the support she needs when she returns home.  MAN plans to breast feed baby and has pump to use at home.  [facility], will provide follow up care for infant. MAN has car seat, bassinet/crib, clothing, bottles and all necessary supplies for baby. MAN has [place], and will be adding baby to this policy.    Plan for c/s 1/15. CM discussed foreseen infant admission to NICU. CM provided time for questions.     CM monitoring for needs.     25 8043   Service Assessment   Patient Orientation Alert and Oriented   Cognition Alert   History Provided By Patient   Primary Caregiver Self   Support Systems Spouse/Significant Other   PCP Verified by CM Yes   Last Visit to PCP Within last 3 months   Prior Functional Level Independent in ADLs/IADLs   Current Functional Level Independent in ADLs/IADLs   Can patient return to prior living arrangement Yes   Ability to make needs known: Good   Family able to assist with home care needs: Yes   Would you like for me to discuss the discharge plan with any other family members/significant others, and if so, who? No   Financial Resources Other (Comment)     Justice Vital CM

## 2025-01-15 LAB
ALBUMIN SERPL-MCNC: 2.6 G/DL (ref 3.5–5)
ALBUMIN/GLOB SERPL: 0.8 (ref 1.1–2.2)
ALP SERPL-CCNC: 123 U/L (ref 45–117)
ALT SERPL-CCNC: 22 U/L (ref 12–78)
ANION GAP SERPL CALC-SCNC: 6 MMOL/L (ref 2–12)
AST SERPL-CCNC: 32 U/L (ref 15–37)
BASOPHILS # BLD: 0.03 K/UL (ref 0–0.1)
BASOPHILS NFR BLD: 0.2 % (ref 0–1)
BILIRUB SERPL-MCNC: 0.4 MG/DL (ref 0.2–1)
BUN SERPL-MCNC: 10 MG/DL (ref 6–20)
BUN/CREAT SERPL: 21 (ref 12–20)
CALCIUM SERPL-MCNC: 8.3 MG/DL (ref 8.5–10.1)
CHLORIDE SERPL-SCNC: 107 MMOL/L (ref 97–108)
CO2 SERPL-SCNC: 22 MMOL/L (ref 21–32)
CREAT SERPL-MCNC: 0.48 MG/DL (ref 0.55–1.02)
DIFFERENTIAL METHOD BLD: ABNORMAL
EOSINOPHIL # BLD: 0.02 K/UL (ref 0–0.4)
EOSINOPHIL NFR BLD: 0.1 % (ref 0–7)
ERYTHROCYTE [DISTWIDTH] IN BLOOD BY AUTOMATED COUNT: 13.5 % (ref 11.5–14.5)
GLOBULIN SER CALC-MCNC: 3.3 G/DL (ref 2–4)
GLUCOSE SERPL-MCNC: 105 MG/DL (ref 65–100)
HCT VFR BLD AUTO: 31.6 % (ref 35–47)
HGB BLD-MCNC: 10.8 G/DL (ref 11.5–16)
IMM GRANULOCYTES # BLD AUTO: 0.4 K/UL (ref 0–0.04)
IMM GRANULOCYTES NFR BLD AUTO: 2.5 % (ref 0–0.5)
LYMPHOCYTES # BLD: 1.57 K/UL (ref 0.8–3.5)
LYMPHOCYTES NFR BLD: 9.9 % (ref 12–49)
MCH RBC QN AUTO: 30 PG (ref 26–34)
MCHC RBC AUTO-ENTMCNC: 34.2 G/DL (ref 30–36.5)
MCV RBC AUTO: 87.8 FL (ref 80–99)
MONOCYTES # BLD: 0.75 K/UL (ref 0–1)
MONOCYTES NFR BLD: 4.7 % (ref 5–13)
NEUTS SEG # BLD: 13.13 K/UL (ref 1.8–8)
NEUTS SEG NFR BLD: 82.6 % (ref 32–75)
NRBC # BLD: 0 K/UL (ref 0–0.01)
NRBC BLD-RTO: 0 PER 100 WBC
PLATELET # BLD AUTO: 176 K/UL (ref 150–400)
PMV BLD AUTO: 13 FL (ref 8.9–12.9)
POTASSIUM SERPL-SCNC: 4.8 MMOL/L (ref 3.5–5.1)
PROT SERPL-MCNC: 5.9 G/DL (ref 6.4–8.2)
RBC # BLD AUTO: 3.6 M/UL (ref 3.8–5.2)
RBC MORPH BLD: ABNORMAL
SODIUM SERPL-SCNC: 135 MMOL/L (ref 136–145)
T PALLIDUM AB SER QL IA: NON REACTIVE
WBC # BLD AUTO: 15.9 K/UL (ref 3.6–11)

## 2025-01-15 PROCEDURE — 6360000002 HC RX W HCPCS: Performed by: OBSTETRICS & GYNECOLOGY

## 2025-01-15 PROCEDURE — 6370000000 HC RX 637 (ALT 250 FOR IP): Performed by: STUDENT IN AN ORGANIZED HEALTH CARE EDUCATION/TRAINING PROGRAM

## 2025-01-15 PROCEDURE — 6370000000 HC RX 637 (ALT 250 FOR IP): Performed by: OBSTETRICS & GYNECOLOGY

## 2025-01-15 PROCEDURE — 99231 SBSQ HOSP IP/OBS SF/LOW 25: CPT | Performed by: OBSTETRICS & GYNECOLOGY

## 2025-01-15 PROCEDURE — 85025 COMPLETE CBC W/AUTO DIFF WBC: CPT

## 2025-01-15 PROCEDURE — 1100000000 HC RM PRIVATE

## 2025-01-15 PROCEDURE — 80053 COMPREHEN METABOLIC PANEL: CPT

## 2025-01-15 PROCEDURE — 36415 COLL VENOUS BLD VENIPUNCTURE: CPT

## 2025-01-15 PROCEDURE — 59025 FETAL NON-STRESS TEST: CPT | Performed by: OBSTETRICS & GYNECOLOGY

## 2025-01-15 RX ORDER — CALCIUM CARBONATE 500 MG/1
1000 TABLET, CHEWABLE ORAL 3 TIMES DAILY PRN
Status: DISCONTINUED | OUTPATIENT
Start: 2025-01-15 | End: 2025-01-20 | Stop reason: HOSPADM

## 2025-01-15 RX ORDER — FERROUS SULFATE 325(65) MG
325 TABLET ORAL
Status: DISCONTINUED | OUTPATIENT
Start: 2025-01-15 | End: 2025-01-20 | Stop reason: HOSPADM

## 2025-01-15 RX ORDER — MAGNESIUM HYDROXIDE/ALUMINUM HYDROXICE/SIMETHICONE 120; 1200; 1200 MG/30ML; MG/30ML; MG/30ML
30 SUSPENSION ORAL EVERY 6 HOURS PRN
Status: DISCONTINUED | OUTPATIENT
Start: 2025-01-15 | End: 2025-01-20 | Stop reason: HOSPADM

## 2025-01-15 RX ORDER — DOCUSATE SODIUM 100 MG/1
100 CAPSULE, LIQUID FILLED ORAL DAILY
Status: DISCONTINUED | OUTPATIENT
Start: 2025-01-15 | End: 2025-01-20 | Stop reason: HOSPADM

## 2025-01-15 RX ORDER — BUTALBITAL, ACETAMINOPHEN AND CAFFEINE 50; 325; 40 MG/1; MG/1; MG/1
1 TABLET ORAL EVERY 4 HOURS PRN
Status: DISCONTINUED | OUTPATIENT
Start: 2025-01-15 | End: 2025-01-20 | Stop reason: HOSPADM

## 2025-01-15 RX ADMIN — DOCUSATE SODIUM 100 MG: 100 CAPSULE, LIQUID FILLED ORAL at 14:28

## 2025-01-15 RX ADMIN — LABETALOL HYDROCHLORIDE 200 MG: 200 TABLET, FILM COATED ORAL at 08:42

## 2025-01-15 RX ADMIN — LABETALOL HYDROCHLORIDE 200 MG: 200 TABLET, FILM COATED ORAL at 21:25

## 2025-01-15 RX ADMIN — FERROUS SULFATE TAB 325 MG (65 MG ELEMENTAL FE) 325 MG: 325 (65 FE) TAB at 16:55

## 2025-01-15 RX ADMIN — Medication 2000 UNITS: at 08:55

## 2025-01-15 RX ADMIN — ALUMINUM HYDROXIDE, MAGNESIUM HYDROXIDE, AND SIMETHICONE 30 ML: 200; 200; 20 SUSPENSION ORAL at 21:12

## 2025-01-15 RX ADMIN — ASPIRIN 81 MG: 81 TABLET, COATED ORAL at 08:42

## 2025-01-15 RX ADMIN — LABETALOL HYDROCHLORIDE 200 MG: 200 TABLET, FILM COATED ORAL at 14:27

## 2025-01-15 RX ADMIN — ANTACID TABLETS 1000 MG: 500 TABLET, CHEWABLE ORAL at 22:59

## 2025-01-15 RX ADMIN — BUTALBITAL, ACETAMINOPHEN, AND CAFFEINE 1 TABLET: 50; 325; 40 TABLET ORAL at 16:37

## 2025-01-15 RX ADMIN — BUPROPION HYDROCHLORIDE 150 MG: 150 TABLET, EXTENDED RELEASE ORAL at 08:42

## 2025-01-15 RX ADMIN — ANTACID TABLETS 500 MG: 500 TABLET, CHEWABLE ORAL at 14:26

## 2025-01-15 RX ADMIN — Medication 1 TABLET: at 08:43

## 2025-01-15 RX ADMIN — METOCLOPRAMIDE 10 MG: 5 INJECTION, SOLUTION INTRAMUSCULAR; INTRAVENOUS at 16:54

## 2025-01-15 RX ADMIN — ACETAMINOPHEN 650 MG: 325 TABLET ORAL at 14:28

## 2025-01-15 ASSESSMENT — PAIN SCALES - GENERAL
PAINLEVEL_OUTOF10: 3
PAINLEVEL_OUTOF10: 3

## 2025-01-15 ASSESSMENT — PAIN DESCRIPTION - LOCATION
LOCATION: HAND
LOCATION: HEAD

## 2025-01-16 LAB
ALBUMIN SERPL-MCNC: 2.6 G/DL (ref 3.5–5)
ALBUMIN/GLOB SERPL: 0.9 (ref 1.1–2.2)
ALP SERPL-CCNC: 116 U/L (ref 45–117)
ALT SERPL-CCNC: 21 U/L (ref 12–78)
ANION GAP SERPL CALC-SCNC: 3 MMOL/L (ref 2–12)
AST SERPL-CCNC: 14 U/L (ref 15–37)
BACTERIA SPEC CULT: NORMAL
BILIRUB SERPL-MCNC: 0.2 MG/DL (ref 0.2–1)
BUN SERPL-MCNC: 10 MG/DL (ref 6–20)
BUN/CREAT SERPL: 16 (ref 12–20)
CALCIUM SERPL-MCNC: 8.2 MG/DL (ref 8.5–10.1)
CHLORIDE SERPL-SCNC: 110 MMOL/L (ref 97–108)
CO2 SERPL-SCNC: 26 MMOL/L (ref 21–32)
CREAT SERPL-MCNC: 0.61 MG/DL (ref 0.55–1.02)
ERYTHROCYTE [DISTWIDTH] IN BLOOD BY AUTOMATED COUNT: 13.9 % (ref 11.5–14.5)
GLOBULIN SER CALC-MCNC: 2.8 G/DL (ref 2–4)
GLUCOSE SERPL-MCNC: 86 MG/DL (ref 65–100)
HCT VFR BLD AUTO: 30.3 % (ref 35–47)
HGB BLD-MCNC: 10.4 G/DL (ref 11.5–16)
MCH RBC QN AUTO: 30.2 PG (ref 26–34)
MCHC RBC AUTO-ENTMCNC: 34.3 G/DL (ref 30–36.5)
MCV RBC AUTO: 88.1 FL (ref 80–99)
NRBC # BLD: 0 K/UL (ref 0–0.01)
NRBC BLD-RTO: 0 PER 100 WBC
PLATELET # BLD AUTO: 158 K/UL (ref 150–400)
PMV BLD AUTO: 12.9 FL (ref 8.9–12.9)
POTASSIUM SERPL-SCNC: 3.8 MMOL/L (ref 3.5–5.1)
PROT SERPL-MCNC: 5.4 G/DL (ref 6.4–8.2)
RBC # BLD AUTO: 3.44 M/UL (ref 3.8–5.2)
SERVICE CMNT-IMP: NORMAL
SODIUM SERPL-SCNC: 139 MMOL/L (ref 136–145)
WBC # BLD AUTO: 11.5 K/UL (ref 3.6–11)

## 2025-01-16 PROCEDURE — 6360000002 HC RX W HCPCS: Performed by: OBSTETRICS & GYNECOLOGY

## 2025-01-16 PROCEDURE — 99231 SBSQ HOSP IP/OBS SF/LOW 25: CPT | Performed by: OBSTETRICS & GYNECOLOGY

## 2025-01-16 PROCEDURE — 59025 FETAL NON-STRESS TEST: CPT

## 2025-01-16 PROCEDURE — 80053 COMPREHEN METABOLIC PANEL: CPT

## 2025-01-16 PROCEDURE — 1100000000 HC RM PRIVATE

## 2025-01-16 PROCEDURE — 6370000000 HC RX 637 (ALT 250 FOR IP): Performed by: OBSTETRICS & GYNECOLOGY

## 2025-01-16 PROCEDURE — 6370000000 HC RX 637 (ALT 250 FOR IP): Performed by: STUDENT IN AN ORGANIZED HEALTH CARE EDUCATION/TRAINING PROGRAM

## 2025-01-16 PROCEDURE — 36415 COLL VENOUS BLD VENIPUNCTURE: CPT

## 2025-01-16 PROCEDURE — 94761 N-INVAS EAR/PLS OXIMETRY MLT: CPT

## 2025-01-16 PROCEDURE — 85027 COMPLETE CBC AUTOMATED: CPT

## 2025-01-16 PROCEDURE — 59025 FETAL NON-STRESS TEST: CPT | Performed by: OBSTETRICS & GYNECOLOGY

## 2025-01-16 PROCEDURE — 2500000003 HC RX 250 WO HCPCS: Performed by: OBSTETRICS & GYNECOLOGY

## 2025-01-16 RX ADMIN — METOCLOPRAMIDE 10 MG: 5 INJECTION, SOLUTION INTRAMUSCULAR; INTRAVENOUS at 08:27

## 2025-01-16 RX ADMIN — ASPIRIN 81 MG: 81 TABLET, COATED ORAL at 08:27

## 2025-01-16 RX ADMIN — METOCLOPRAMIDE 10 MG: 5 INJECTION, SOLUTION INTRAMUSCULAR; INTRAVENOUS at 15:44

## 2025-01-16 RX ADMIN — METOCLOPRAMIDE 10 MG: 5 INJECTION, SOLUTION INTRAMUSCULAR; INTRAVENOUS at 21:46

## 2025-01-16 RX ADMIN — BUPROPION HYDROCHLORIDE 150 MG: 150 TABLET, EXTENDED RELEASE ORAL at 08:27

## 2025-01-16 RX ADMIN — FERROUS SULFATE TAB 325 MG (65 MG ELEMENTAL FE) 325 MG: 325 (65 FE) TAB at 18:18

## 2025-01-16 RX ADMIN — Medication 2000 UNITS: at 08:27

## 2025-01-16 RX ADMIN — LABETALOL HYDROCHLORIDE 200 MG: 200 TABLET, FILM COATED ORAL at 08:27

## 2025-01-16 RX ADMIN — LABETALOL HYDROCHLORIDE 200 MG: 200 TABLET, FILM COATED ORAL at 15:44

## 2025-01-16 RX ADMIN — ANTACID TABLETS 1000 MG: 500 TABLET, CHEWABLE ORAL at 12:55

## 2025-01-16 RX ADMIN — SODIUM CHLORIDE, PRESERVATIVE FREE 10 ML: 5 INJECTION INTRAVENOUS at 21:49

## 2025-01-16 RX ADMIN — Medication 1 TABLET: at 08:27

## 2025-01-16 RX ADMIN — LABETALOL HYDROCHLORIDE 200 MG: 200 TABLET, FILM COATED ORAL at 21:46

## 2025-01-17 ENCOUNTER — ANESTHESIA (OUTPATIENT)
Facility: HOSPITAL | Age: 35
End: 2025-01-17
Payer: COMMERCIAL

## 2025-01-17 ENCOUNTER — ANESTHESIA EVENT (OUTPATIENT)
Facility: HOSPITAL | Age: 35
End: 2025-01-17
Payer: COMMERCIAL

## 2025-01-17 PROBLEM — Z3A.35 35 WEEKS GESTATION OF PREGNANCY: Status: ACTIVE | Noted: 2025-01-17

## 2025-01-17 LAB
ALBUMIN SERPL-MCNC: 2.3 G/DL (ref 3.5–5)
ALBUMIN/GLOB SERPL: 0.8 (ref 1.1–2.2)
ALP SERPL-CCNC: 114 U/L (ref 45–117)
ALT SERPL-CCNC: 19 U/L (ref 12–78)
ANION GAP SERPL CALC-SCNC: 6 MMOL/L (ref 2–12)
AST SERPL-CCNC: 18 U/L (ref 15–37)
BILIRUB SERPL-MCNC: 0.3 MG/DL (ref 0.2–1)
BUN SERPL-MCNC: 11 MG/DL (ref 6–20)
BUN/CREAT SERPL: 21 (ref 12–20)
CALCIUM SERPL-MCNC: 8 MG/DL (ref 8.5–10.1)
CHLORIDE SERPL-SCNC: 110 MMOL/L (ref 97–108)
CO2 SERPL-SCNC: 23 MMOL/L (ref 21–32)
CREAT SERPL-MCNC: 0.53 MG/DL (ref 0.55–1.02)
ERYTHROCYTE [DISTWIDTH] IN BLOOD BY AUTOMATED COUNT: 13.8 % (ref 11.5–14.5)
GLOBULIN SER CALC-MCNC: 3 G/DL (ref 2–4)
GLUCOSE SERPL-MCNC: 84 MG/DL (ref 65–100)
HCT VFR BLD AUTO: 29.5 % (ref 35–47)
HGB BLD-MCNC: 10.1 G/DL (ref 11.5–16)
MCH RBC QN AUTO: 30.1 PG (ref 26–34)
MCHC RBC AUTO-ENTMCNC: 34.2 G/DL (ref 30–36.5)
MCV RBC AUTO: 87.8 FL (ref 80–99)
NRBC # BLD: 0 K/UL (ref 0–0.01)
NRBC BLD-RTO: 0 PER 100 WBC
PLATELET # BLD AUTO: 154 K/UL (ref 150–400)
PMV BLD AUTO: 13 FL (ref 8.9–12.9)
POTASSIUM SERPL-SCNC: 4.2 MMOL/L (ref 3.5–5.1)
PROT SERPL-MCNC: 5.3 G/DL (ref 6.4–8.2)
RBC # BLD AUTO: 3.36 M/UL (ref 3.8–5.2)
SODIUM SERPL-SCNC: 139 MMOL/L (ref 136–145)
WBC # BLD AUTO: 11.2 K/UL (ref 3.6–11)

## 2025-01-17 PROCEDURE — 88302 TISSUE EXAM BY PATHOLOGIST: CPT

## 2025-01-17 PROCEDURE — 6370000000 HC RX 637 (ALT 250 FOR IP): Performed by: OBSTETRICS & GYNECOLOGY

## 2025-01-17 PROCEDURE — 2500000003 HC RX 250 WO HCPCS: Performed by: NURSE ANESTHETIST, CERTIFIED REGISTERED

## 2025-01-17 PROCEDURE — 2709999900 HC NON-CHARGEABLE SUPPLY: Performed by: OBSTETRICS & GYNECOLOGY

## 2025-01-17 PROCEDURE — 6360000002 HC RX W HCPCS: Performed by: OBSTETRICS & GYNECOLOGY

## 2025-01-17 PROCEDURE — 80053 COMPREHEN METABOLIC PANEL: CPT

## 2025-01-17 PROCEDURE — 3609079900 HC CESAREAN SECTION: Performed by: OBSTETRICS & GYNECOLOGY

## 2025-01-17 PROCEDURE — 99465 NB RESUSCITATION: CPT

## 2025-01-17 PROCEDURE — 6360000002 HC RX W HCPCS: Performed by: NURSE ANESTHETIST, CERTIFIED REGISTERED

## 2025-01-17 PROCEDURE — 7100000000 HC PACU RECOVERY - FIRST 15 MIN: Performed by: OBSTETRICS & GYNECOLOGY

## 2025-01-17 PROCEDURE — 3700000001 HC ADD 15 MINUTES (ANESTHESIA): Performed by: OBSTETRICS & GYNECOLOGY

## 2025-01-17 PROCEDURE — 2580000003 HC RX 258: Performed by: OBSTETRICS & GYNECOLOGY

## 2025-01-17 PROCEDURE — 7100000001 HC PACU RECOVERY - ADDTL 15 MIN: Performed by: OBSTETRICS & GYNECOLOGY

## 2025-01-17 PROCEDURE — 0UB70ZZ EXCISION OF BILATERAL FALLOPIAN TUBES, OPEN APPROACH: ICD-10-PCS | Performed by: OBSTETRICS & GYNECOLOGY

## 2025-01-17 PROCEDURE — 6370000000 HC RX 637 (ALT 250 FOR IP): Performed by: STUDENT IN AN ORGANIZED HEALTH CARE EDUCATION/TRAINING PROGRAM

## 2025-01-17 PROCEDURE — 2500000003 HC RX 250 WO HCPCS: Performed by: OBSTETRICS & GYNECOLOGY

## 2025-01-17 PROCEDURE — 86762 RUBELLA ANTIBODY: CPT

## 2025-01-17 PROCEDURE — 1120000000 HC RM PRIVATE OB

## 2025-01-17 PROCEDURE — 3700000000 HC ANESTHESIA ATTENDED CARE: Performed by: OBSTETRICS & GYNECOLOGY

## 2025-01-17 PROCEDURE — 59515 CESAREAN DELIVERY: CPT | Performed by: OBSTETRICS & GYNECOLOGY

## 2025-01-17 PROCEDURE — 58700 REMOVAL OF FALLOPIAN TUBE: CPT | Performed by: OBSTETRICS & GYNECOLOGY

## 2025-01-17 PROCEDURE — 85027 COMPLETE CBC AUTOMATED: CPT

## 2025-01-17 PROCEDURE — 6360000002 HC RX W HCPCS: Performed by: ANESTHESIOLOGY

## 2025-01-17 PROCEDURE — 36415 COLL VENOUS BLD VENIPUNCTURE: CPT

## 2025-01-17 PROCEDURE — 87340 HEPATITIS B SURFACE AG IA: CPT

## 2025-01-17 RX ORDER — ONDANSETRON 2 MG/ML
INJECTION INTRAMUSCULAR; INTRAVENOUS
Status: DISCONTINUED | OUTPATIENT
Start: 2025-01-17 | End: 2025-01-17 | Stop reason: SDUPTHER

## 2025-01-17 RX ORDER — SODIUM CHLORIDE 0.9 % (FLUSH) 0.9 %
10 SYRINGE (ML) INJECTION PRN
Status: DISCONTINUED | OUTPATIENT
Start: 2025-01-17 | End: 2025-01-20 | Stop reason: HOSPADM

## 2025-01-17 RX ORDER — HYDROCODONE BITARTRATE AND ACETAMINOPHEN 5; 325 MG/1; MG/1
1 TABLET ORAL EVERY 4 HOURS PRN
Status: DISCONTINUED | OUTPATIENT
Start: 2025-01-18 | End: 2025-01-20 | Stop reason: HOSPADM

## 2025-01-17 RX ORDER — SODIUM CHLORIDE, SODIUM LACTATE, POTASSIUM CHLORIDE, CALCIUM CHLORIDE 600; 310; 30; 20 MG/100ML; MG/100ML; MG/100ML; MG/100ML
INJECTION, SOLUTION INTRAVENOUS CONTINUOUS
Status: DISCONTINUED | OUTPATIENT
Start: 2025-01-17 | End: 2025-01-20 | Stop reason: HOSPADM

## 2025-01-17 RX ORDER — ACETAMINOPHEN 500 MG
1000 TABLET ORAL EVERY 8 HOURS SCHEDULED
Status: DISCONTINUED | OUTPATIENT
Start: 2025-01-17 | End: 2025-01-20 | Stop reason: HOSPADM

## 2025-01-17 RX ORDER — FENTANYL CITRATE 50 UG/ML
INJECTION, SOLUTION INTRAMUSCULAR; INTRAVENOUS
Status: COMPLETED | OUTPATIENT
Start: 2025-01-17 | End: 2025-01-17

## 2025-01-17 RX ORDER — FAMOTIDINE 10 MG/ML
INJECTION, SOLUTION INTRAVENOUS
Status: DISCONTINUED | OUTPATIENT
Start: 2025-01-17 | End: 2025-01-17 | Stop reason: SDUPTHER

## 2025-01-17 RX ORDER — SODIUM CHLORIDE 0.9 % (FLUSH) 0.9 %
5-40 SYRINGE (ML) INJECTION EVERY 12 HOURS SCHEDULED
Status: DISCONTINUED | OUTPATIENT
Start: 2025-01-17 | End: 2025-01-20 | Stop reason: HOSPADM

## 2025-01-17 RX ORDER — DOCUSATE SODIUM 100 MG/1
100 CAPSULE, LIQUID FILLED ORAL 2 TIMES DAILY
Status: DISCONTINUED | OUTPATIENT
Start: 2025-01-17 | End: 2025-01-20 | Stop reason: HOSPADM

## 2025-01-17 RX ORDER — ACETAMINOPHEN 325 MG/1
975 TABLET ORAL ONCE
Status: DISCONTINUED | OUTPATIENT
Start: 2025-01-17 | End: 2025-01-20 | Stop reason: HOSPADM

## 2025-01-17 RX ORDER — FAMOTIDINE 20 MG/1
20 TABLET, FILM COATED ORAL 2 TIMES DAILY PRN
Status: DISCONTINUED | OUTPATIENT
Start: 2025-01-17 | End: 2025-01-20 | Stop reason: HOSPADM

## 2025-01-17 RX ORDER — KETOROLAC TROMETHAMINE 30 MG/ML
30 INJECTION, SOLUTION INTRAMUSCULAR; INTRAVENOUS EVERY 6 HOURS
Status: COMPLETED | OUTPATIENT
Start: 2025-01-17 | End: 2025-01-18

## 2025-01-17 RX ORDER — MORPHINE SULFATE 1 MG/ML
INJECTION, SOLUTION EPIDURAL; INTRATHECAL; INTRAVENOUS
Status: COMPLETED | OUTPATIENT
Start: 2025-01-17 | End: 2025-01-17

## 2025-01-17 RX ORDER — SODIUM CHLORIDE, SODIUM LACTATE, POTASSIUM CHLORIDE, AND CALCIUM CHLORIDE .6; .31; .03; .02 G/100ML; G/100ML; G/100ML; G/100ML
1000 INJECTION, SOLUTION INTRAVENOUS ONCE
Status: DISCONTINUED | OUTPATIENT
Start: 2025-01-17 | End: 2025-01-20 | Stop reason: HOSPADM

## 2025-01-17 RX ORDER — OXYTOCIN 10 [USP'U]/ML
INJECTION, SOLUTION INTRAMUSCULAR; INTRAVENOUS
Status: DISCONTINUED | OUTPATIENT
Start: 2025-01-17 | End: 2025-01-17 | Stop reason: SDUPTHER

## 2025-01-17 RX ORDER — ONDANSETRON 4 MG/1
4 TABLET, ORALLY DISINTEGRATING ORAL EVERY 8 HOURS PRN
Status: DISCONTINUED | OUTPATIENT
Start: 2025-01-17 | End: 2025-01-20 | Stop reason: HOSPADM

## 2025-01-17 RX ORDER — IBUPROFEN 800 MG/1
800 TABLET, FILM COATED ORAL EVERY 8 HOURS
Status: DISCONTINUED | OUTPATIENT
Start: 2025-01-18 | End: 2025-01-20 | Stop reason: HOSPADM

## 2025-01-17 RX ORDER — BISACODYL 10 MG
10 SUPPOSITORY, RECTAL RECTAL DAILY PRN
Status: DISCONTINUED | OUTPATIENT
Start: 2025-01-17 | End: 2025-01-20 | Stop reason: HOSPADM

## 2025-01-17 RX ORDER — TRANEXAMIC ACID 100 MG/ML
INJECTION, SOLUTION INTRAVENOUS
Status: DISCONTINUED | OUTPATIENT
Start: 2025-01-17 | End: 2025-01-17 | Stop reason: SDUPTHER

## 2025-01-17 RX ORDER — SODIUM CHLORIDE, SODIUM LACTATE, POTASSIUM CHLORIDE, CALCIUM CHLORIDE 600; 310; 30; 20 MG/100ML; MG/100ML; MG/100ML; MG/100ML
INJECTION, SOLUTION INTRAVENOUS CONTINUOUS
Status: DISCONTINUED | OUTPATIENT
Start: 2025-01-17 | End: 2025-01-17

## 2025-01-17 RX ORDER — SODIUM CHLORIDE 0.9 % (FLUSH) 0.9 %
5-40 SYRINGE (ML) INJECTION PRN
Status: DISCONTINUED | OUTPATIENT
Start: 2025-01-17 | End: 2025-01-20 | Stop reason: HOSPADM

## 2025-01-17 RX ORDER — ONDANSETRON 2 MG/ML
4 INJECTION INTRAMUSCULAR; INTRAVENOUS EVERY 6 HOURS PRN
Status: DISCONTINUED | OUTPATIENT
Start: 2025-01-17 | End: 2025-01-20 | Stop reason: HOSPADM

## 2025-01-17 RX ORDER — SODIUM CHLORIDE 9 MG/ML
INJECTION, SOLUTION INTRAVENOUS PRN
Status: DISCONTINUED | OUTPATIENT
Start: 2025-01-17 | End: 2025-01-20 | Stop reason: HOSPADM

## 2025-01-17 RX ORDER — BUPIVACAINE HYDROCHLORIDE 7.5 MG/ML
INJECTION, SOLUTION INTRASPINAL
Status: COMPLETED | OUTPATIENT
Start: 2025-01-17 | End: 2025-01-17

## 2025-01-17 RX ORDER — PHENYLEPHRINE HCL IN 0.9% NACL 0.4MG/10ML
SYRINGE (ML) INTRAVENOUS
Status: DISCONTINUED | OUTPATIENT
Start: 2025-01-17 | End: 2025-01-17 | Stop reason: SDUPTHER

## 2025-01-17 RX ORDER — HYDROCODONE BITARTRATE AND ACETAMINOPHEN 5; 325 MG/1; MG/1
2 TABLET ORAL EVERY 4 HOURS PRN
Status: DISCONTINUED | OUTPATIENT
Start: 2025-01-18 | End: 2025-01-20 | Stop reason: HOSPADM

## 2025-01-17 RX ORDER — MISOPROSTOL 200 UG/1
800 TABLET ORAL PRN
Status: DISCONTINUED | OUTPATIENT
Start: 2025-01-17 | End: 2025-01-20 | Stop reason: HOSPADM

## 2025-01-17 RX ADMIN — ONDANSETRON 4 MG: 2 INJECTION, SOLUTION INTRAMUSCULAR; INTRAVENOUS at 11:40

## 2025-01-17 RX ADMIN — LABETALOL HYDROCHLORIDE 200 MG: 200 TABLET, FILM COATED ORAL at 09:24

## 2025-01-17 RX ADMIN — Medication 120 MCG: at 11:44

## 2025-01-17 RX ADMIN — KETOROLAC TROMETHAMINE 30 MG: 30 INJECTION, SOLUTION INTRAMUSCULAR at 20:55

## 2025-01-17 RX ADMIN — OXYTOCIN 40 ML: 10 INJECTION, SOLUTION INTRAMUSCULAR; INTRAVENOUS at 12:10

## 2025-01-17 RX ADMIN — FAMOTIDINE 20 MG: 10 INJECTION, SOLUTION INTRAVENOUS at 11:40

## 2025-01-17 RX ADMIN — TRANEXAMIC ACID 1000 MG: 100 INJECTION, SOLUTION INTRAVENOUS at 12:12

## 2025-01-17 RX ADMIN — ONDANSETRON 4 MG: 2 INJECTION, SOLUTION INTRAMUSCULAR; INTRAVENOUS at 16:55

## 2025-01-17 RX ADMIN — Medication 1 TABLET: at 09:25

## 2025-01-17 RX ADMIN — KETOROLAC TROMETHAMINE 30 MG: 30 INJECTION, SOLUTION INTRAMUSCULAR at 14:03

## 2025-01-17 RX ADMIN — FENTANYL CITRATE 10 MCG: 50 INJECTION, SOLUTION INTRAMUSCULAR; INTRAVENOUS at 11:38

## 2025-01-17 RX ADMIN — MORPHINE SULFATE 0.2 MG: 1 INJECTION, SOLUTION EPIDURAL; INTRATHECAL; INTRAVENOUS at 11:38

## 2025-01-17 RX ADMIN — BUPROPION HYDROCHLORIDE 150 MG: 150 TABLET, EXTENDED RELEASE ORAL at 09:23

## 2025-01-17 RX ADMIN — BUPIVACAINE HYDROCHLORIDE IN DEXTROSE 13.5 MG: 7.5 INJECTION, SOLUTION SUBARACHNOID at 11:38

## 2025-01-17 RX ADMIN — SODIUM CHLORIDE, PRESERVATIVE FREE 10 ML: 5 INJECTION INTRAVENOUS at 04:52

## 2025-01-17 RX ADMIN — LABETALOL HYDROCHLORIDE 200 MG: 200 TABLET, FILM COATED ORAL at 20:55

## 2025-01-17 RX ADMIN — SODIUM CHLORIDE, POTASSIUM CHLORIDE, SODIUM LACTATE AND CALCIUM CHLORIDE: 600; 310; 30; 20 INJECTION, SOLUTION INTRAVENOUS at 11:33

## 2025-01-17 RX ADMIN — Medication 2000 UNITS: at 09:23

## 2025-01-17 RX ADMIN — SODIUM CHLORIDE, POTASSIUM CHLORIDE, SODIUM LACTATE AND CALCIUM CHLORIDE 500 ML: 600; 310; 30; 20 INJECTION, SOLUTION INTRAVENOUS at 10:19

## 2025-01-17 RX ADMIN — SODIUM CHLORIDE, POTASSIUM CHLORIDE, SODIUM LACTATE AND CALCIUM CHLORIDE: 600; 310; 30; 20 INJECTION, SOLUTION INTRAVENOUS at 13:22

## 2025-01-17 RX ADMIN — LABETALOL HYDROCHLORIDE 200 MG: 200 TABLET, FILM COATED ORAL at 14:15

## 2025-01-17 ASSESSMENT — PAIN DESCRIPTION - LOCATION
LOCATION: ABDOMEN
LOCATION: ABDOMEN

## 2025-01-17 ASSESSMENT — PAIN DESCRIPTION - ORIENTATION
ORIENTATION: LOWER
ORIENTATION: MID

## 2025-01-17 ASSESSMENT — PAIN SCALES - GENERAL
PAINLEVEL_OUTOF10: 2
PAINLEVEL_OUTOF10: 2

## 2025-01-17 ASSESSMENT — PAIN DESCRIPTION - DESCRIPTORS
DESCRIPTORS: ACHING;CRAMPING
DESCRIPTORS: CRAMPING

## 2025-01-17 NOTE — BRIEF OP NOTE
Brief Postoperative Note      Patient: Neeta Magdaleno  YOB: 1990  MRN: [id number]    Date of Procedure: 2025    Pre-Op Diagnosis Codes:      * Previous  delivery affecting pregnancy [O34.219]    Post-Op Diagnosis:  same, SIPIH       Procedure(s):   SECTION    Surgeon(s):  Renee Santillan MD    Assistant:  Surgical Assistant: Aster Mcknight    Anesthesia: Spinal    Estimated Blood Loss (mL): 1000    Complications: None    Specimens:   * No specimens in log *    Implants:  * No implants in log *      Drains:   Urinary Catheter 25 Cotter (Active)       Findings:  Infection Present At Time Of Surgery (PATOS) (choose all levels that have infection present):  No infection present  Other Findings: LBI    Electronically signed by Renee Santillan MD on 2025 at 1:14 PM

## 2025-01-17 NOTE — ANESTHESIA PROCEDURE NOTES
Spinal Block    Patient location during procedure: OR  End time: 1/17/2025 11:40 AM  Reason for block: primary anesthetic  Staffing  Resident/CRNA: Ymail Eli APRN - CRNA  Performed by: Yamil Eli APRN - CRNA  Authorized by: Douglas Xiao MD    Spinal Block  Patient position: sitting  Prep: DuraPrep  Patient monitoring: continuous pulse ox, continuous capnometry, frequent blood pressure checks and oxygen  Approach: midline  Location: L4/L5  Guidance: paresthesia technique  Provider prep: mask and sterile gloves  Local infiltration: lidocaine  Needle  Needle type: Pencan   Needle gauge: 24 G  Needle length: 3.5 in  Assessment  Sensory level: T6  Swirl obtained: Yes  CSF: clear  Attempts: 2  Hemodynamics: stable  Preanesthetic Checklist  Completed: patient identified, IV checked, site marked, risks and benefits discussed, surgical/procedural consents, equipment checked, pre-op evaluation, timeout performed, anesthesia consent given, oxygen available, monitors applied/VS acknowledged, fire risk safety assessment completed and verbalized and blood product R/B/A discussed and consented

## 2025-01-17 NOTE — L&D DELIVERY NOTE
Angelika Magdaleno [[id number]]      Labor Events     Labor: No   Steroids: Full Course  Cervical Ripening Date/Time:      Antibiotics Received during Labor: Yes  Rupture Identifier: Sac 1  Rupture Date/Time:  25 12:09:00   Rupture Type: AROM  Fluid Color: Clear  Fluid Odor: None  Fluid Volume: Moderate  Induction: None  Augmentation: None  Labor Complications: None       Anesthesia    Method: Spinal       Delivery Details      Delivery Date: 25 Delivery Time: 12:09:00   Delivery Type: , Low Transverse  Trial of Labor?: No   Categorization: Repeat   Priority: scheduled  Indications for : Prior Uterine Surgery       Skin Incision Type: Pfannenstiel  Uterine Incision: Low Transverse       [place] Presentation    Presentation: Vertex  Position: Right  _: Occiput  _: Anterior       Shoulder Dystocia    Shoulder Dystocia Present?: No       Assisted Delivery Details    Forceps Attempted?: No  Vacuum Extractor Attempted?: No                           Cord    Vessels: 3 Vessels  Complications: Nuchal Loose  Cord Around: Trunk  Delayed Cord Clamping?: Yes  Cord Clamped Date/Time: 2025 12:11:04  Cord Blood Disposition: Discard  Gases Sent?: No   Cord Comments:  PROCEDURAL - OBSTETRIC - CORD OBSERVATION   Loose body cord             Placenta    Date/Time: 2025 12:12:00  Removal: Manual Removal  Appearance: Intact  Disposition: Discarded       Lacerations    Episiotomy: None  Perineal Lacerations: None  Other Lacerations: no non-perineal laceration       Vaginal Counts    Initial Count Personnel: NA  Initial Count Verified By: NA  Final Count Personnel: NA  Final Count Verified By: NA       Blood Loss  Mother: Neeta Magdaleno #[id number]     Start of Mother's Information      Delivery Blood Loss   Intrapartum & Postpartum: 25 - 25 1743    Delivery Admission: 25 1743         Intrapartum & Postpartum Delivery Admission

## 2025-01-17 NOTE — PLAN OF CARE
Problem: Chronic Conditions and Co-morbidities  Goal: Patient's chronic conditions and co-morbidity symptoms are monitored and maintained or improved  1/17/2025 0437 by Amanda Boothe RN  Outcome: Progressing  1/16/2025 1607 by Ginette Choi RN  Outcome: Progressing     Problem: Pain  Goal: Verbalizes/displays adequate comfort level or baseline comfort level  1/17/2025 0437 by Amanda Boothe RN  Outcome: Progressing  1/16/2025 1607 by Ginette Choi RN  Outcome: Progressing     Problem: Safety - Adult  Goal: Free from fall injury  1/17/2025 0437 by Amanda Boothe RN  Outcome: Progressing  1/16/2025 1607 by Ginette Choi RN  Outcome: Progressing

## 2025-01-17 NOTE — OP NOTE
[facility]  [phone]  Renee Santillan MD, FACOG       FULL OPERATIVE NOTE:  SECTION    Pre-operative Diagnosis: Previous  delivery affecting pregnancy [O34.219]  SIPIH  Undesired fertility  Increased future pregnancy risk    Post-operative Diagnosis: same    Procedure: Low transverse  section, repeat, bilateral salpingectomy    Surgeon: Renee Santillan MD    Assistant: labor and delivery staff,   Surgical Assistant: Aster Mcknight  Scrub Person First: Anastasia Vitale    EBL: 1000 cc    Urine output: per anesthesia records    Anesthesia: Spinal    Prophylactic Antibiotics: Ancef    DVT Prophylaxis: Sequential Compression Devices    Complications: none    Implants: none    Procedure Detail:      After proper patient identification and consent, the patient was taken to the operating room, where spinal anesthesia was administered and found to be adequate. A cifuentes catheter had been previously placed using sterile technique.  The patient was prepped and draped in the normal sterile fashion for abdominopelvic surgery.      A Pfannenstiel skin incision was made with a scalpel and carried down to the rectus fascia with blunt and sharp dissection.  The rectus fascia was opened in the midline with the scalpel and extended laterally with the Arnold scissors.  The superior aspect of the fascial incision was then grasped with two Kocher clamps, elevated and the underlying rectus muscles were dissected off with blunt and sharp dissection.  In a similar fashion, the inferior aspect of the fascial incision was grasped with two Kocher clamps and the underlying rectus muscle were dissected off with blunt and sharp dissection.  The rectus muscles were  bluntly in the midline.  The peritoneum was elevated and entered bluntly well superior to the bladder without any apparent injury.  A bladder blade was inserted and the vesicouterine peritoneum was identified.  The bladder

## 2025-01-17 NOTE — ANESTHESIA PRE PROCEDURE
Department of Anesthesiology  Preprocedure Note       Name:  Neeta Magdaleno   Age:  34 y.o.  :  1990                                          MRN:  [id number]         Date:  2025      Surgeon: Surgeon(s):  Renee Santillan MD    Procedure: Procedure(s):   SECTION    Medications prior to admission:   Prior to Admission medications    Medication Sig Start Date End Date Taking? Authorizing Provider   ASPIRIN 81 PO Take by mouth   Yes Wilber Valadez MD   Cholecalciferol (VITAMIN D) 50 MCG (2000) CAPS capsule Take by mouth   Yes Wilber Valadez MD   Prenatal Vit-Fe Fumarate-FA (PRENATAL VITAMIN PO) Take 1 tablet by mouth daily   Yes Wilber Valadez MD   buPROPion (WELLBUTRIN XL) 150 MG extended release tablet Take 1 tablet by mouth 20  Yes Automatic Reconciliation, Ar   labetalol (NORMODYNE) 200 MG tablet Take 1 tablet by mouth in the morning, at noon, and at bedtime ceived the following from [facility]: Outside name: labetaloL (NORMODYNE) 200 mg tablet 8/3/21  Yes Automatic Reconciliation, Ar   NIFEdipine (PROCARDIA XL) 30 MG extended release tablet Take 1 tablet by mouth every 12 hours  Patient not taking: Reported on 2025   Cezar Rubi MD       Current medications:    Current Facility-Administered Medications   Medication Dose Route Frequency Provider Last Rate Last Admin   • ferrous sulfate (IRON 325) tablet 325 mg  325 mg Oral Dinner Renee Santillan MD   325 mg at 25 1818   • docusate sodium (COLACE) capsule 100 mg  100 mg Oral Daily Renee Santillan MD   100 mg at 01/15/25 1428   • butalbital-acetaminophen-caffeine (FIORICET, ESGIC) per tablet 1 tablet  1 tablet Oral Q4H PRN Renee Santillan MD   1 tablet at 01/15/25 1637   • aluminum & magnesium hydroxide-simethicone (MAALOX) 200-200-20 MG/5ML suspension 30 mL  30 mL Oral Q6H PRN Cezar Rubi MD   30 mL at 01/15/25 2112   • calcium carbonate (TUMS) chewable

## 2025-01-18 LAB
ERYTHROCYTE [DISTWIDTH] IN BLOOD BY AUTOMATED COUNT: 13.5 % (ref 11.5–14.5)
HBV SURFACE AG SER QL: <0.1 INDEX
HBV SURFACE AG SER QL: NEGATIVE
HCT VFR BLD AUTO: 27.6 % (ref 35–47)
HGB BLD-MCNC: 9.4 G/DL (ref 11.5–16)
MCH RBC QN AUTO: 29.9 PG (ref 26–34)
MCHC RBC AUTO-ENTMCNC: 34.1 G/DL (ref 30–36.5)
MCV RBC AUTO: 87.9 FL (ref 80–99)
NRBC # BLD: 0 K/UL (ref 0–0.01)
NRBC BLD-RTO: 0 PER 100 WBC
PLATELET # BLD AUTO: 158 K/UL (ref 150–400)
RBC # BLD AUTO: 3.14 M/UL (ref 3.8–5.2)
RUBV IGG SERPL IA-ACNC: NORMAL IU/ML
WBC # BLD AUTO: 16.1 K/UL (ref 3.6–11)

## 2025-01-18 PROCEDURE — 6360000002 HC RX W HCPCS: Performed by: OBSTETRICS & GYNECOLOGY

## 2025-01-18 PROCEDURE — 6370000000 HC RX 637 (ALT 250 FOR IP): Performed by: OBSTETRICS & GYNECOLOGY

## 2025-01-18 PROCEDURE — 6370000000 HC RX 637 (ALT 250 FOR IP): Performed by: STUDENT IN AN ORGANIZED HEALTH CARE EDUCATION/TRAINING PROGRAM

## 2025-01-18 PROCEDURE — 1120000000 HC RM PRIVATE OB

## 2025-01-18 PROCEDURE — 36415 COLL VENOUS BLD VENIPUNCTURE: CPT

## 2025-01-18 PROCEDURE — 85027 COMPLETE CBC AUTOMATED: CPT

## 2025-01-18 RX ORDER — IBUPROFEN 800 MG/1
800 TABLET, FILM COATED ORAL EVERY 8 HOURS PRN
Qty: 30 TABLET | Refills: 0 | Status: SHIPPED | OUTPATIENT
Start: 2025-01-18 | End: 2025-01-25

## 2025-01-18 RX ORDER — LABETALOL 200 MG/1
200 TABLET, FILM COATED ORAL 2 TIMES DAILY
Qty: 60 TABLET | Refills: 3 | Status: SHIPPED | OUTPATIENT
Start: 2025-01-18

## 2025-01-18 RX ORDER — HYDROCODONE BITARTRATE AND ACETAMINOPHEN 5; 325 MG/1; MG/1
1 TABLET ORAL EVERY 6 HOURS PRN
Qty: 12 TABLET | Refills: 0 | Status: SHIPPED | OUTPATIENT
Start: 2025-01-18 | End: 2025-01-21

## 2025-01-18 RX ADMIN — LABETALOL HYDROCHLORIDE 200 MG: 200 TABLET, FILM COATED ORAL at 16:53

## 2025-01-18 RX ADMIN — KETOROLAC TROMETHAMINE 30 MG: 30 INJECTION, SOLUTION INTRAMUSCULAR at 10:15

## 2025-01-18 RX ADMIN — HYDROCODONE BITARTRATE AND ACETAMINOPHEN 2 TABLET: 5; 325 TABLET ORAL at 21:46

## 2025-01-18 RX ADMIN — BUPROPION HYDROCHLORIDE 150 MG: 150 TABLET, EXTENDED RELEASE ORAL at 10:15

## 2025-01-18 RX ADMIN — LABETALOL HYDROCHLORIDE 200 MG: 200 TABLET, FILM COATED ORAL at 10:15

## 2025-01-18 RX ADMIN — ACETAMINOPHEN 1000 MG: 500 TABLET ORAL at 13:24

## 2025-01-18 RX ADMIN — FERROUS SULFATE TAB 325 MG (65 MG ELEMENTAL FE) 325 MG: 325 (65 FE) TAB at 17:42

## 2025-01-18 RX ADMIN — KETOROLAC TROMETHAMINE 30 MG: 30 INJECTION, SOLUTION INTRAMUSCULAR at 02:38

## 2025-01-18 RX ADMIN — Medication 2000 UNITS: at 10:14

## 2025-01-18 RX ADMIN — IBUPROFEN 800 MG: 800 TABLET, FILM COATED ORAL at 16:53

## 2025-01-18 RX ADMIN — ASPIRIN 81 MG: 81 TABLET, COATED ORAL at 10:14

## 2025-01-18 RX ADMIN — DOCUSATE SODIUM 100 MG: 100 CAPSULE, LIQUID FILLED ORAL at 10:15

## 2025-01-18 RX ADMIN — LABETALOL HYDROCHLORIDE 200 MG: 200 TABLET, FILM COATED ORAL at 21:48

## 2025-01-18 RX ADMIN — DOCUSATE SODIUM 100 MG: 100 CAPSULE, LIQUID FILLED ORAL at 21:48

## 2025-01-18 RX ADMIN — Medication 1 TABLET: at 10:14

## 2025-01-18 ASSESSMENT — PAIN DESCRIPTION - DESCRIPTORS
DESCRIPTORS: SORE

## 2025-01-18 ASSESSMENT — PAIN DESCRIPTION - LOCATION
LOCATION: ABDOMEN

## 2025-01-18 ASSESSMENT — PAIN DESCRIPTION - ORIENTATION
ORIENTATION: LOWER
ORIENTATION: RIGHT;LOWER
ORIENTATION: LOWER
ORIENTATION: LOWER

## 2025-01-18 ASSESSMENT — PAIN SCALES - GENERAL
PAINLEVEL_OUTOF10: 4
PAINLEVEL_OUTOF10: 5
PAINLEVEL_OUTOF10: 4
PAINLEVEL_OUTOF10: 7

## 2025-01-18 ASSESSMENT — PAIN - FUNCTIONAL ASSESSMENT: PAIN_FUNCTIONAL_ASSESSMENT: ACTIVITIES ARE NOT PREVENTED

## 2025-01-18 NOTE — DISCHARGE INSTRUCTIONS
[facility]  [email or web address]  [phone]    Renee Santillan MD, FACOG     POST DELIVERY DISCHARGE INSTRUCTIONS  FROM YOUR PHYSICIAN    Name: Neeta Magdaleno  YOB: 1990    General:     Read all discharge information provided by the hospital    Diet/Diet Restrictions:  Eat healthy meals and snacks as desired.    Eat foods that are high in fiber and low in fat and cholesterol.  Drink eight 8-ounce glasses of water daily; avoid excessive caffeine intake.  http://www.[employer].gov/healthy-eating-tips.html  http://www.[employer].gov/pregnancy-breastfeeding.html    Medications:   See discharge medication list and read instructions carefully.    For Anemia:   Your results show some anemia, or low blood count.   You should start, or add, an over the counter elemental iron supplement of 45-65 mg.  Consider 'Slow FE' or ferrous sulfate 325 mg once a day for at least three months.  Also take vitamin C 250 mg and a daily prenatal vitamin to help anemia.  Add a stool softener, like docusate or colace 100 mg (2x/day) to avoid constipation.  If you do not tolerate supplements you can try blackstrap molasses (1 tbsp=27mg elemental iron).    Breast Feeding:  See instructions from your lactation consult.  Call [phone] [[phone]] for more information or to locate a lactation consultant.  http://www.cdc.gov/breastfeeding/index.htm    Vaccines:  If you received the MMR vaccine postpartum you should wait three months until you get pregnant again.    You, and close contacts, should make sure that the Tdap vaccine is up to date.    This vaccine can decrease the risk of your baby getting pertussis or \"whooping cough.\"    You, and close contacts, should receive the influenza vaccine during flu season when appropriate.  http://www.cdc.gov/vaccines/hcp/acip-recs/index.html    Tobacco Use:  If you (or other people around the baby) smoke or use tobacco products, please try to  use and

## 2025-01-18 NOTE — DISCHARGE SUMMARY
Obstetrical Discharge Summary     Name: Neeta Magdaleno MRN: [id number]  SSN: [id number]    YOB: 1990  Age: 34 y.o.  Sex: female      Admit Date: 2025    Discharge Date:  2025  4:02 PM    Admitting Physician: Haroon Santillan MD     Attending Physician:  Haroon Santillan MD, FACOG    Admission Diagnoses: Hypertension affecting pregnancy in third trimester [O16.3]  35 weeks gestation of pregnancy [Z3A.35]    Condition on Discharge: Stable    Procedures: LTCS, repeat, bilateral salpingectomy    Disposition: to home    Follow up:    Call  as soon as possible to schedule for your six week postpartum visit.    Follow-up Information    None          Discharge Diagnoses:   Angelika Magdaleno [[id number]]      [place] Information    Head delivery date/time: 2025 12:09:00   Changing the 's delivery date/time could affect patient care.:      Delivery date/time:  25 1209   Delivery type: , Low Transverse    Details:  Trial of labor?: No    categorization: Repeat    priority: scheduled   Indications for : Prior Uterine Surgery   Skin Incision Type: Pfannenstiel     Uterine Incision: Low Transverse                    Delivery Type: , Low Transverse   By Delivering Clinician:HAROON SANTILLAN   Delivery Date /Time: 2025 12:09 PM       Additional Diagnoses:   Patient Active Problem List   Diagnosis    Sepsis (HCC)    Depression    Essential hypertension     section wound seroma, postpartum    ADD (attention deficit disorder)    Antepartum multigravida of advanced maternal age    Hypertension affecting pregnancy in third trimester    Pre-existing essential hypertension during pregnancy in third trimester    Pre-existing essential hypertension complicating pregnancy in third trimester    33 weeks gestation of pregnancy    35 weeks gestation of pregnancy        Prenatal Labs:    No components found for: \"OBEXTABORH\",

## 2025-01-18 NOTE — LACTATION NOTE
This note was copied from a baby's chart.  This is mother's second baby. Mother states she tried to breast feed her first baby but did not make enough breast milk. She did not feel any breast changes in pregnancy. Mother desires to pump with her new baby. She has a pump at home. Symphony pump set up at bedside.Instructed mother to pump Q 2-3 hours for 20 minutes.Skin to skin emphasized. Reviewed how to store and prepare expressed breast milk for baby.    Discussed with mother her plan for feeding.  Reviewed the benefits of exclusive breast milk feeding during the hospital stay.   Informed her of the risks of using formula to supplement in the first few days of life as well as the benefits of successful breast milk feeding; referred her to the Breastfeeding booklet about this information.   She acknowledges understanding of information reviewed and states that it is her plan to pump and bottle feed her breast milk for her infant.  Will support her choice and offer additional information as needed.     Pumping:  Guidelines for pumping, milk collection and storage, proper cleaning of pump parts all reviewed.  How to establish and maintain breast milk supply through pumping reviewed.  Differences between hospital grade rental pumps vs store bought double electric/hand pumps discussed.  Set up pumping with double electric set up.  Assisted with pump session.   List of area pump rental locations and lactation support services provided.    Discussed eating a healthy diet. Instructed mother to eat a variety of foods in order to get a well balanced diet. She should consume an extra 500 calories per day (more than her non-pregnant requirement.) These extra calories will help provide energy needed for optimal breast milk production. Mother also encouraged to \"drink to thirst\" and it is recommended that she drink fluids such as water, fruit/vegetable juice. Nutritious snacks should be available so that she can eat throughout

## 2025-01-19 PROCEDURE — 1120000000 HC RM PRIVATE OB

## 2025-01-19 PROCEDURE — 6370000000 HC RX 637 (ALT 250 FOR IP): Performed by: STUDENT IN AN ORGANIZED HEALTH CARE EDUCATION/TRAINING PROGRAM

## 2025-01-19 PROCEDURE — 6370000000 HC RX 637 (ALT 250 FOR IP): Performed by: OBSTETRICS & GYNECOLOGY

## 2025-01-19 PROCEDURE — 94761 N-INVAS EAR/PLS OXIMETRY MLT: CPT

## 2025-01-19 RX ADMIN — HYDROCODONE BITARTRATE AND ACETAMINOPHEN 2 TABLET: 5; 325 TABLET ORAL at 18:11

## 2025-01-19 RX ADMIN — HYDROCODONE BITARTRATE AND ACETAMINOPHEN 2 TABLET: 5; 325 TABLET ORAL at 22:21

## 2025-01-19 RX ADMIN — IBUPROFEN 800 MG: 800 TABLET, FILM COATED ORAL at 09:02

## 2025-01-19 RX ADMIN — LABETALOL HYDROCHLORIDE 200 MG: 200 TABLET, FILM COATED ORAL at 22:21

## 2025-01-19 RX ADMIN — ASPIRIN 81 MG: 81 TABLET, COATED ORAL at 09:00

## 2025-01-19 RX ADMIN — IBUPROFEN 800 MG: 800 TABLET, FILM COATED ORAL at 01:15

## 2025-01-19 RX ADMIN — HYDROCODONE BITARTRATE AND ACETAMINOPHEN 2 TABLET: 5; 325 TABLET ORAL at 04:09

## 2025-01-19 RX ADMIN — BUPROPION HYDROCHLORIDE 150 MG: 150 TABLET, EXTENDED RELEASE ORAL at 09:00

## 2025-01-19 RX ADMIN — LABETALOL HYDROCHLORIDE 200 MG: 200 TABLET, FILM COATED ORAL at 16:27

## 2025-01-19 RX ADMIN — HYDROCODONE BITARTRATE AND ACETAMINOPHEN 2 TABLET: 5; 325 TABLET ORAL at 09:01

## 2025-01-19 RX ADMIN — HYDROCODONE BITARTRATE AND ACETAMINOPHEN 2 TABLET: 5; 325 TABLET ORAL at 14:13

## 2025-01-19 RX ADMIN — LABETALOL HYDROCHLORIDE 200 MG: 200 TABLET, FILM COATED ORAL at 09:01

## 2025-01-19 RX ADMIN — Medication 1 TABLET: at 09:00

## 2025-01-19 RX ADMIN — DOCUSATE SODIUM 100 MG: 100 CAPSULE, LIQUID FILLED ORAL at 09:00

## 2025-01-19 RX ADMIN — IBUPROFEN 800 MG: 800 TABLET, FILM COATED ORAL at 16:26

## 2025-01-19 RX ADMIN — FERROUS SULFATE TAB 325 MG (65 MG ELEMENTAL FE) 325 MG: 325 (65 FE) TAB at 16:27

## 2025-01-19 RX ADMIN — Medication 2000 UNITS: at 09:02

## 2025-01-19 ASSESSMENT — PAIN DESCRIPTION - ORIENTATION
ORIENTATION: LOWER

## 2025-01-19 ASSESSMENT — PAIN DESCRIPTION - LOCATION
LOCATION: ABDOMEN;INCISION
LOCATION: ABDOMEN;INCISION
LOCATION: ABDOMEN
LOCATION: ABDOMEN
LOCATION: ABDOMEN;INCISION

## 2025-01-19 ASSESSMENT — PAIN DESCRIPTION - DESCRIPTORS
DESCRIPTORS: ACHING;SORE
DESCRIPTORS: ACHING;DISCOMFORT;SORE
DESCRIPTORS: CRAMPING
DESCRIPTORS: ACHING;SORE
DESCRIPTORS: CRAMPING

## 2025-01-19 ASSESSMENT — PAIN - FUNCTIONAL ASSESSMENT
PAIN_FUNCTIONAL_ASSESSMENT: ACTIVITIES ARE NOT PREVENTED
PAIN_FUNCTIONAL_ASSESSMENT: ACTIVITIES ARE NOT PREVENTED

## 2025-01-19 ASSESSMENT — PAIN SCALES - GENERAL
PAINLEVEL_OUTOF10: 5
PAINLEVEL_OUTOF10: 2
PAINLEVEL_OUTOF10: 4
PAINLEVEL_OUTOF10: 3
PAINLEVEL_OUTOF10: 4
PAINLEVEL_OUTOF10: 3

## 2025-01-20 VITALS
HEIGHT: 65 IN | BODY MASS INDEX: 33.49 KG/M2 | SYSTOLIC BLOOD PRESSURE: 145 MMHG | WEIGHT: 201 LBS | DIASTOLIC BLOOD PRESSURE: 84 MMHG | TEMPERATURE: 98.1 F | HEART RATE: 66 BPM | RESPIRATION RATE: 16 BRPM | OXYGEN SATURATION: 100 %

## 2025-01-20 LAB
ALBUMIN SERPL-MCNC: 2.7 G/DL (ref 3.5–5)
ALBUMIN/GLOB SERPL: 0.8 (ref 1.1–2.2)
ALP SERPL-CCNC: 96 U/L (ref 45–117)
ALT SERPL-CCNC: 22 U/L (ref 12–78)
ANION GAP SERPL CALC-SCNC: 8 MMOL/L (ref 2–12)
AST SERPL-CCNC: 24 U/L (ref 15–37)
BILIRUB SERPL-MCNC: 0.4 MG/DL (ref 0.2–1)
BUN SERPL-MCNC: 10 MG/DL (ref 6–20)
BUN/CREAT SERPL: 15 (ref 12–20)
CALCIUM SERPL-MCNC: 8.8 MG/DL (ref 8.5–10.1)
CHLORIDE SERPL-SCNC: 105 MMOL/L (ref 97–108)
CO2 SERPL-SCNC: 25 MMOL/L (ref 21–32)
CREAT SERPL-MCNC: 0.66 MG/DL (ref 0.55–1.02)
CREAT UR-MCNC: 34 MG/DL
ERYTHROCYTE [DISTWIDTH] IN BLOOD BY AUTOMATED COUNT: 14 % (ref 11.5–14.5)
GLOBULIN SER CALC-MCNC: 3.3 G/DL (ref 2–4)
GLUCOSE SERPL-MCNC: 98 MG/DL (ref 65–100)
HCT VFR BLD AUTO: 30.1 % (ref 35–47)
HGB BLD-MCNC: 9.9 G/DL (ref 11.5–16)
MCH RBC QN AUTO: 30 PG (ref 26–34)
MCHC RBC AUTO-ENTMCNC: 32.9 G/DL (ref 30–36.5)
MCV RBC AUTO: 91.2 FL (ref 80–99)
NRBC # BLD: 0 K/UL (ref 0–0.01)
NRBC BLD-RTO: 0 PER 100 WBC
PLATELET # BLD AUTO: 166 K/UL (ref 150–400)
PMV BLD AUTO: 12.4 FL (ref 8.9–12.9)
POTASSIUM SERPL-SCNC: 3.9 MMOL/L (ref 3.5–5.1)
PROT SERPL-MCNC: 6 G/DL (ref 6.4–8.2)
PROT UR-MCNC: 7 MG/DL (ref 0–11.9)
PROT/CREAT UR-RTO: 0.2
RBC # BLD AUTO: 3.3 M/UL (ref 3.8–5.2)
SODIUM SERPL-SCNC: 138 MMOL/L (ref 136–145)
WBC # BLD AUTO: 10.3 K/UL (ref 3.6–11)

## 2025-01-20 PROCEDURE — 84156 ASSAY OF PROTEIN URINE: CPT

## 2025-01-20 PROCEDURE — 6370000000 HC RX 637 (ALT 250 FOR IP): Performed by: STUDENT IN AN ORGANIZED HEALTH CARE EDUCATION/TRAINING PROGRAM

## 2025-01-20 PROCEDURE — 82570 ASSAY OF URINE CREATININE: CPT

## 2025-01-20 PROCEDURE — 36415 COLL VENOUS BLD VENIPUNCTURE: CPT

## 2025-01-20 PROCEDURE — 6370000000 HC RX 637 (ALT 250 FOR IP): Performed by: OBSTETRICS & GYNECOLOGY

## 2025-01-20 PROCEDURE — 99024 POSTOP FOLLOW-UP VISIT: CPT | Performed by: OBSTETRICS & GYNECOLOGY

## 2025-01-20 PROCEDURE — 85027 COMPLETE CBC AUTOMATED: CPT

## 2025-01-20 PROCEDURE — 80053 COMPREHEN METABOLIC PANEL: CPT

## 2025-01-20 RX ORDER — NIFEDIPINE 30 MG/1
30 TABLET, EXTENDED RELEASE ORAL DAILY
Status: DISCONTINUED | OUTPATIENT
Start: 2025-01-20 | End: 2025-01-20 | Stop reason: HOSPADM

## 2025-01-20 RX ORDER — NIFEDIPINE 30 MG/1
30 TABLET, EXTENDED RELEASE ORAL DAILY
Qty: 30 TABLET | Refills: 0 | Status: SHIPPED | OUTPATIENT
Start: 2025-01-21

## 2025-01-20 RX ADMIN — Medication 1 TABLET: at 10:27

## 2025-01-20 RX ADMIN — IBUPROFEN 800 MG: 800 TABLET, FILM COATED ORAL at 10:27

## 2025-01-20 RX ADMIN — HYDROCODONE BITARTRATE AND ACETAMINOPHEN 1 TABLET: 5; 325 TABLET ORAL at 06:13

## 2025-01-20 RX ADMIN — IBUPROFEN 800 MG: 800 TABLET, FILM COATED ORAL at 00:18

## 2025-01-20 RX ADMIN — LABETALOL HYDROCHLORIDE 200 MG: 200 TABLET, FILM COATED ORAL at 10:25

## 2025-01-20 RX ADMIN — NIFEDIPINE 30 MG: 30 TABLET, FILM COATED, EXTENDED RELEASE ORAL at 12:24

## 2025-01-20 RX ADMIN — Medication 2000 UNITS: at 10:27

## 2025-01-20 RX ADMIN — BUPROPION HYDROCHLORIDE 150 MG: 150 TABLET, EXTENDED RELEASE ORAL at 10:28

## 2025-01-20 ASSESSMENT — PAIN DESCRIPTION - LOCATION
LOCATION: ABDOMEN
LOCATION: ABDOMEN;INCISION
LOCATION: ABDOMEN;INCISION

## 2025-01-20 ASSESSMENT — PAIN DESCRIPTION - DESCRIPTORS
DESCRIPTORS: DISCOMFORT;SORE
DESCRIPTORS: ACHING
DESCRIPTORS: ACHING;DISCOMFORT;SORE

## 2025-01-20 ASSESSMENT — PAIN DESCRIPTION - ORIENTATION
ORIENTATION: LOWER

## 2025-01-20 ASSESSMENT — PAIN SCALES - GENERAL: PAINLEVEL_OUTOF10: 4

## 2025-01-20 NOTE — CONSULTS
Clinical Ethics Consultation Note    ERD Advisement    Consulted by Dr. Santillan regarding salpingectomy request for this 33yo  woman who has a hx of increasing number of  sections and increased pregnancy complication risks including but not limited to adhesions, previa, increased risk of uterine rupture with future pregnancies. The procedure is ethically justified as physician affirms that future pregnancy would pose proportionate risk to mother and baby as the uterus can no longer attain its procreative end.     Amanda Carrera  Ethics Consultant

## 2025-01-20 NOTE — PROGRESS NOTES
[facility] High Risk Pregnancy Note    Neeta Magdaleno  34w4d    Hospital Day: 2    Patient admitted for chronic hypertension with superimposed severe preeclampsia 34w4d Estimated Date of Delivery: 2/21/25 states she not have headache, vision changes, SOB, RUQ pain. Denies contractions, vaginal bleeding, leakage of fluid. Reports normal fetal movement.    Vitals:  Patient Vitals for the past 24 hrs:   BP Temp Temp src Pulse Resp SpO2   01/14/25 1146 135/81 -- -- 86 -- 96 %   01/14/25 1046 115/80 98.6 °F (37 °C) Oral 96 16 94 %   01/14/25 0946 123/80 -- -- 83 -- 98 %   01/14/25 0846 136/86 98.7 °F (37.1 °C) Oral 93 16 99 %   01/14/25 0747 (!) 140/81 98.6 °F (37 °C) Oral 75 16 98 %   01/14/25 0650 -- -- -- -- -- 98 %   01/14/25 0646 132/87 -- -- 71 -- --   01/14/25 0610 -- -- -- 80 -- 99 %   01/14/25 0608 (!) 140/79 -- -- 68 -- --   01/14/25 0605 -- -- -- 73 -- 98 %   01/14/25 0446 123/71 -- -- 71 -- --   01/14/25 0445 -- -- -- 63 -- 97 %   01/14/25 0247 -- -- -- -- -- 98 %   01/14/25 0246 115/79 -- -- 80 -- --   01/14/25 0147 -- -- -- -- -- 99 %   01/14/25 0146 124/79 -- -- 76 -- --   01/14/25 0047 125/81 98 °F (36.7 °C) Oral 87 16 100 %   01/13/25 2347 130/72 -- -- 79 -- 97 %   01/13/25 2246 123/72 -- -- 78 -- 94 %   01/13/25 2147 -- -- -- -- -- 95 %   01/13/25 2146 125/80 -- -- 81 -- --   01/13/25 2047 -- -- -- -- -- 97 %   01/13/25 2046 136/66 -- -- (!) 103 -- 97 %   01/13/25 1952 -- -- -- -- -- 97 %   01/13/25 1948 112/62 97.7 °F (36.5 °C) Oral 93 -- 97 %   01/13/25 1846 (!) 141/79 -- -- 84 18 98 %   01/13/25 1746 139/89 -- -- 82 17 98 %   01/13/25 1645 (!) 141/91 98.6 °F (37 °C) Oral 87 19 99 %   01/13/25 1616 137/79 -- -- 67 17 98 %   01/13/25 1600 (!) 151/95 98.5 °F (36.9 °C) Oral 79 19 99 %   01/13/25 1551 (!) 153/90 -- -- 67 -- --   01/13/25 1531 (!) 147/95 -- -- 84 -- 97 %   01/13/25 1516 (!) 141/85 -- -- 65 -- 98 %   01/13/25 1500 (!) 138/90 -- -- 81 -- 97 %   01/13/25 1446 (!) 
0600: Bedside and Verbal shift change report given to SUSAN Marin RN (oncoming nurse) by CLEMENTE Tam RN (offgoing nurse). Report included the following information Nurse Handoff Report, Index, Adult Overview, Intake/Output, MAR, Recent Results, and Event Log.     1026: Reactive NST noted.     1515: Bedside and Verbal shift change report given to SONJA Choi RN (oncoming nurse) by SUSAN Marin RN (offgoing nurse). Report included the following information Nurse Handoff Report, Index, Adult Overview, Intake/Output, MAR, Recent Results, and Event Log.     
0700  Bedside and Verbal shift change report given to LINDA Bryan  (oncoming nurse) by JOHNSON Zimmer (offgoing nurse). Report included the following information Adult Overview, Intake/Output, MAR, Recent Results, and Med Rec Status.    0930  MD Whitmore at bedside to discuss POC  1135  Pt ambulates to OR 1 for c section   1209  Viable male infant born in OR 1 at this time  1253  Pt taken to L&D 214 via stretcher  1407  MD whitmore called via phone about /92 & 162//83. See MAR for treatment. See orders      1650  TRANSFER - OUT REPORT:    Verbal report given to JOHNSON Vaca  on Neeta Magdaleno  being transferred to [facility]U  for routine progression of patient care       Report consisted of patient's Situation, Background, Assessment and   Recommendations(SBAR).     Information from the following report(s) Adult Overview, Surgery Report, Intake/Output, MAR, Recent Results, and Med Rec Status was reviewed with the receiving nurse.           Lines:   Peripheral IV 01/13/25 Posterior;Right Hand (Active)   Site Assessment Clean, dry & intact 01/17/25 0750   Line Status Capped 01/17/25 0750   Line Care Connections checked and tightened;Cap changed 01/17/25 0750   Phlebitis Assessment No symptoms 01/17/25 0750   Infiltration Assessment 0 01/17/25 0750   Alcohol Cap Used Yes 01/17/25 0750   Dressing Status Clean, dry & intact 01/17/25 0750   Dressing Type Transparent 01/17/25 0750   Dressing Intervention New 01/13/25 1958        Opportunity for questions and clarification was provided.      Patient transported with:  Registered Nurse       
0700: Bedside and Verbal shift change report given to SUSAN Marin RN (oncoming nurse) by EVAN Meadows RN (offgoing nurse). Report included the following information Nurse Handoff Report, Index, Adult Overview, Intake/Output, MAR, Recent Results, and Event Log. .    0826: Reactive NST noted. MD Phelps states patient can come off continuous EFM tracing at this time as long as patient's Bps are stable.    1402: Pt at M at this time.     1500: .Report given to EVAN Godfrey RN.  
0700: Bedside and Verbal shift change report given to Telma DYSON RN (oncoming nurse) by Ericka DYSON RN (offgoing nurse). Report included the following information Nurse Handoff Report, Adult Overview, Intake/Output, MAR, Recent Results, and Med Rec Status.     1028: Reactive NST.    0705: Bedside and Verbal shift change report given to Adrienne TORRES RN (oncoming nurse) by Telma DYSON RN (offgoing nurse). Report included the following information Nurse Handoff Report, Adult Overview, Intake/Output, MAR, Recent Results, and Med Rec Status.     
0730: Bedside and Verbal shift change report given to EDWIN Feldman (oncoming nurse) by  (offgoing nurse). Report included the following information Nurse Handoff Report, MAR, and Recent Results    1110: Called Dr. Santillan at this time and notified provider that patient had 1 severe BP at 1009, but repeat was normal, see flowsheet. Patient denies symptoms at this time. Notified provider that nurse has acknowledged and sent labs ordered to lab. No new orders at this time, awaiting lab results.     1350: Spoke to Dr. Santillan at this time and notified provider of patient's repeat BP post procardia administration to be 145/84. Patient denies any symptoms at this time. Per Dr. Santillan, patient can be discharged.     1400: Bedside and Verbal shift change report given to CELINE Ibanez RN (oncoming nurse) by EDWIN Feldman (offgoing nurse). Report included the following information Nurse Handoff Report, MAR, and Recent Results.        
1200: Patient arrives to labor and delivery from Dr. Santillan's office rule out pre-eclampsia and monitor chronic hypertension.    1216: Dr. Santillan at the bedside at this time speaking to patient.     1547: Perfectserved Dr. Santillan at this time and notified provider that patient has been feeling contractions more compared to earlier when she arrived.     1558: Dr. Santillan ordered 500 mL bolus, see MAR and stated she will be coming to check patient's cervix.     1630: Dr. Santillan at the bedside at this time assessing patient. SVE completed and noted to be closed at this time.     1900: Bedside and Verbal shift change report given to EDWIN Escoto (oncoming nurse) by EDWIN Feldman (offgoing nurse). Report included the following information Nurse Handoff Report and MAR.     
1500 report received from ESVIN Marin RN    1520 Pt back from [facility]    1600 Dr. Yap at bedside, tentative plan for repeat C/S on Friday 1/17/25. Pt aware    1900 Bedside and Verbal shift change report given to MEJIA Oneal RN (oncoming nurse) by RYLAND Godfrey RN (offgoing nurse). Report included the following information Nurse Handoff Report, Index, Adult Overview, Intake/Output, MAR, and Recent Results.     
1900: Assumed care of patient. Patient denies any HA, RUQ pain, N/V, vision changes, vaginal bleeding or leakage of fluids. Patient endorses positive fetal movement.     1930: Patient okay to come off EFM monitor and to resume NST BID per VORB from Mark CRAFT.   
1900: Bedside and Verbal shift change report given to Tigist RN  (oncoming nurse) by Gaston RN  (offgoing nurse). Report included the following information Nurse Handoff Report, Adult Overview, Recent Results, Quality Measures, and Event Log. Care assumed at this time.     2054: NICU provider at bedside to consult.     0700: Verbal shift change report given to Tania RN  (oncoming nurse) by Astrid RN  (offgoing nurse). Report included the following information Nurse Handoff Report, MAR, Recent Results, Quality Measures, and Event Log.    
2005: Dr. Rubi reviewed EFM. RN instructred to monitor patient for 20 more minutes after last variable. EFM overall category 1 and reactive.  
7064-Handoff to EDWIN Christian   0207-Reassuming care at this time  0325-PT requesting to shower, RN provided CHG wipes and educated pt on how to use (surgical area) after shower. Pt demonstrates understanding.   
[facility] Progress Note    Neeta Magdaleno  34w3d  LOS: 0    Patient states she has had a mild headache on and off today.  Good FM.  She reports more regular contractions    Vitals  BP: (!) 141/91  Temp: 98.6 °F (37 °C)  Temp Source: Oral  Pulse: 87  Respirations: 19  SpO2: 99 %  Patient Vitals for the past 24 hrs:   Temp Pulse Resp BP SpO2   25 1645 98.6 °F (37 °C) 87 19 (!) 141/91 99 %   25 1616 -- 67 17 137/79 98 %   25 1600 98.5 °F (36.9 °C) 79 19 (!) 151/95 99 %   25 1551 -- 67 -- (!) 153/90 --   25 1531 -- 84 -- (!) 147/95 97 %   25 1516 -- 65 -- (!) 141/85 98 %   25 1500 -- 81 -- (!) 138/90 97 %   25 1446 -- 85 -- (!) 150/88 99 %   25 1431 -- 63 19 (!) 159/82 96 %   25 1416 -- 64 -- 133/82 97 %   25 1400 -- 61 -- (!) 140/96 --   25 1330 -- 67 -- (!) 137/92 97 %   25 1316 -- 91 19 (!) 148/104 98 %   25 1259 -- 80 19 (!) 139/96 98 %   25 1245 -- 76 17 (!) 138/94 98 %   25 1233 -- 64 18 (!) 160/93 99 %   25 1216 -- 75 17 (!) 143/98 99 %   25 1200 98.5 °F (36.9 °C) 68 19 (!) 164/93 97 %     Temp (24hrs), Av.5 °F (36.9 °C), Min:98.5 °F (36.9 °C), Max:98.6 °F (37 °C)      Last 24hr Input/Output:  No intake or output data in the 24 hours ending 25 2297   ]    Exam:    General: Patient without distress.  Abdomen: soft, non-tender  Fundus: soft, gravid, non-tender  Extremities: no redness or tenderness or cords        Cervix: 30 % effaced, soft, post, closed, ex os ftp    Labs:   Recent Results (from the past 24 hour(s))   CBC with Auto Differential    Collection Time: 25 12:04 PM   Result Value Ref Range    WBC 9.4 3.6 - 11.0 K/uL    RBC 3.73 (L) 3.80 - 5.20 M/uL    Hemoglobin 11.2 (L) 11.5 - 16.0 g/dL    Hematocrit 32.8 (L) 35.0 - 47.0 %    MCV 87.9 80.0 - 99.0 FL    MCH 30.0 26.0 - 34.0 PG    MCHC 34.1 30.0 - 36.5 g/dL    RDW 13.4 11.5 - 14.5 %    Platelets 182 150 - 400 K/uL    Nucleated RBCs 
[facility] Progress Note    Neeta Magdaleno  34w5d  LOS: 2    Patient states she has no new complaints.  She reports good FM.  Still has mild HA that comes and goes.  No vaginal bleeding, vaginal discharge, LOF  Occ cramping.      Vitals  BP: (!) 154/99  Temp: 98.1 °F (36.7 °C)  Temp Source: Oral  Pulse: 72  Respirations: 17  SpO2: 95 %  Patient Vitals for the past 24 hrs:   Temp Pulse Resp BP SpO2   01/15/25 0653 -- 72 -- (!) 154/99 --   01/15/25 0553 -- 61 -- (!) 145/89 --   01/15/25 0453 -- 79 -- (!) 145/92 --   01/15/25 0353 -- 54 -- (!) 142/88 --   01/15/25 0253 -- 73 -- 121/68 --   01/15/25 0153 -- 76 -- 134/82 --   01/15/25 0053 -- 72 -- (!) 145/77 --   25 2353 -- 69 -- (!) 143/80 --   25 2253 -- 67 -- 127/86 --   25 2153 -- 74 -- 118/73 --   25 2051 -- 72 -- 134/80 --   25 1926 98.1 °F (36.7 °C) 79 17 (!) 145/86 95 %   25 1808 -- 88 -- 138/69 --   25 1708 -- 80 -- (!) 142/82 --   25 1608 -- 83 -- (!) 148/90 --   25 1248 -- 84 16 (!) 144/74 98 %   25 1146 -- 86 -- 135/81 96 %   25 1046 98.6 °F (37 °C) 96 16 115/80 94 %   25 0946 -- 83 -- 123/80 98 %     Temp (24hrs), Av.4 °F (36.9 °C), Min:98.1 °F (36.7 °C), Max:98.6 °F (37 °C)      Last 24hr Input/Output:  No intake or output data in the 24 hours ending 01/15/25 0918   ]    Exam:    General: Patient without distress.  Abdomen: soft, non-tender  Fundus: soft, gravid, non-tender  Extremities: no redness or tenderness or cords          Labs:   Recent Results (from the past 24 hour(s))   Comprehensive Metabolic Panel    Collection Time: 01/15/25  6:02 AM   Result Value Ref Range    Sodium 135 (L) 136 - 145 mmol/L    Potassium 4.8 3.5 - 5.1 mmol/L    Chloride 107 97 - 108 mmol/L    CO2 22 21 - 32 mmol/L    Anion Gap 6 2 - 12 mmol/L    Glucose 105 (H) 65 - 100 mg/dL    BUN 10 6 - 20 MG/DL    Creatinine 0.48 (L) 0.55 - 1.02 MG/DL    BUN/Creatinine Ratio 21 (H) 12 - 20      Est, Glom Filt Rate 
[facility] Progress Note    Neeta Magdaleno  34w6d  LOS: 3    Late entry from rounding at 1230 pm    Patient states she co more reflux symptoms.  Good FM  No headache currently.  No VB/VD/LOF or regular UC.  Some pressure.  Patient com more GERD sx.  Patient reports some decreased FM this am.    Later in the pm: ~ 5pm pt reported good FM in PM.    Vitals  BP: 130/87  Temp: 98.4 °F (36.9 °C)  Temp Source: Oral  Pulse: 73  Respirations: 16  SpO2: 99 %  Patient Vitals for the past 24 hrs:   Temp Pulse Resp BP SpO2   25 1911 98.4 °F (36.9 °C) 73 16 130/87 99 %   25 1542 99 °F (37.2 °C) 78 16 (!) 146/79 98 %   25 1257 -- 76 -- (!) 146/96 --   25 1147 98.4 °F (36.9 °C) 82 16 (!) 150/97 98 %   25 0725 98.4 °F (36.9 °C) 75 17 (!) 139/93 99 %   25 0357 98.5 °F (36.9 °C) 78 16 (!) 141/91 --   01/15/25 2305 -- -- -- 135/84 --   01/15/25 2125 -- 87 -- 133/87 --   01/15/25 2005 98.1 °F (36.7 °C) 86 16 118/75 97 %   01/15/25 1949 -- -- -- -- 97 %     Temp (24hrs), Av.5 °F (36.9 °C), Min:98.1 °F (36.7 °C), Max:99 °F (37.2 °C)      Last 24hr Input/Output:  No intake or output data in the 24 hours ending 25 1933   ]    Exam:    General: Patient without distress.  Abdomen: soft, non-tender  Fundus: soft, gravid, non-tender  Extremities: no redness or tenderness or cords          Labs:   Recent Results (from the past 24 hour(s))   CBC    Collection Time: 25  1:03 PM   Result Value Ref Range    WBC 11.5 (H) 3.6 - 11.0 K/uL    RBC 3.44 (L) 3.80 - 5.20 M/uL    Hemoglobin 10.4 (L) 11.5 - 16.0 g/dL    Hematocrit 30.3 (L) 35.0 - 47.0 %    MCV 88.1 80.0 - 99.0 FL    MCH 30.2 26.0 - 34.0 PG    MCHC 34.3 30.0 - 36.5 g/dL    RDW 13.9 11.5 - 14.5 %    Platelets 158 150 - 400 K/uL    MPV 12.9 8.9 - 12.9 FL    Nucleated RBCs 0.0 0  WBC    nRBC 0.00 0.00 - 0.01 K/uL   Comprehensive Metabolic Panel    Collection Time: 25  1:03 PM   Result Value Ref Range    Sodium 139 136 - 145 mmol/L    
Late entry.    Contacted by LD staff for severe range BP x1. Patient without any PIH symptoms.   Patient due for oral labetalol dose.  Will give it early and recheck BP.  Consider labetalol protocol or postpartum magnesium for SIPIH.    Will continue to monitor closely.    Vitals:    01/17/25 1415 01/17/25 1445 01/17/25 1454 01/17/25 1515   BP: (!) 162/82  (!) 142/85 (!) 148/89   Pulse: 69      Resp:       Temp:  98.1 °F (36.7 °C)     TempSrc:  Oral     SpO2:       Weight:       Height:         If BP remain stable for at least two hours, consider transfer to postpartum unit, otherwise continue observation on LD.    Renee Santillan MD    Sign out given to covering [facility], Dr. Mast.          
PROGRESS NOTE  2 Days Post-Op      Patient now 2 Days Post-Op following , Low Transverse done on 2025 - 2025 under Spinal anesthesia.     No significant complaints.  Pain controlled on medication.  Voiding without difficulty, normal lochia.    Vitals  Patient Vitals for the past 24 hrs:   BP Temp Temp src Pulse Resp SpO2   25 0634 133/89 98.1 °F (36.7 °C) Oral 70 16 98 %   25 0205 131/74 98.1 °F (36.7 °C) Oral 71 16 98 %   25 2148 122/79 98.1 °F (36.7 °C) Oral 89 16 99 %   25 1801 121/72 97.3 °F (36.3 °C) -- 71 16 98 %   25 1653 131/78 -- -- 77 -- --   25 1433 128/78 98.1 °F (36.7 °C) -- 79 16 100 %   25 1005 131/84 98.6 °F (37 °C) Oral 86 16 98 %       Intake/Output Summary (Last 24 hours) at 2025 0938  Last data filed at 2025 1000  Gross per 24 hour   Intake --   Output 200 ml   Net -200 ml       Exam:  Patient without distress.               Abdomen soft, flat, non-tender with fundus firm.                 Incision clean, dry, and intact without erythema.               Lower extremities are negative for swelling, cords or tenderness..    Labs  Recent Results (from the past 48 hour(s))   CBC    Collection Time: 25  6:19 AM   Result Value Ref Range    WBC 16.1 (H) 3.6 - 11.0 K/uL    RBC 3.14 (L) 3.80 - 5.20 M/uL    Hemoglobin 9.4 (L) 11.5 - 16.0 g/dL    Hematocrit 27.6 (L) 35.0 - 47.0 %    MCV 87.9 80.0 - 99.0 FL    MCH 29.9 26.0 - 34.0 PG    MCHC 34.1 30.0 - 36.5 g/dL    RDW 13.5 11.5 - 14.5 %    Platelets 158 150 - 400 K/uL    Nucleated RBCs 0.0 0  WBC    nRBC 0.00 0.00 - 0.01 K/uL       Assessment and Plan  Principal Problem:    Hypertension affecting pregnancy in third trimester  Active Problems:    35 weeks gestation of pregnancy  Resolved Problems:    * No resolved hospital problems. *     Patient doing well 2 Days Post-Op post , Low Transverse delivery   Continue routine post-op care and maternal education.      
Post-Operative  Progress Note    Patient doing well without significant complaint.   Nausea and vomiting resolved.    She is tolerating oral intake.   Pain well controlled.       Delivery information:  Delivery Type: , Low Transverse   By Delivering Clinician:HAROON SANTILLAN   Delivery Date /Time: 2025 12:09 PM     Information for the patient's :  Sharla, Male Neeta [[id number]]   , Low Transverse     Vitals:  Patient Vitals for the past 12 hrs:   Temp Pulse Resp BP SpO2   25 0602 98.1 °F (36.7 °C) 65 16 (!) 152/90 99 %   25 0302 -- -- -- 134/72 --   25 0232 98.6 °F (37 °C) 75 18 (!) 151/91 100 %   25 2220 -- 68 -- (!) 152/84 100 %   25 2219 97.7 °F (36.5 °C) 72 16 (!) 149/91 100 %       Temp (24hrs), Av.1 °F (36.7 °C), Min:97.3 °F (36.3 °C), Max:98.6 °F (37 °C)      Last 24hr Input/Output:  No intake or output data in the 24 hours ending 25 0929     Exam:    Gen: Patient without distress holding baby in chair  Lungs: clear to ascultation bilaterally  Cor: S1, S2, regular rate and rhythm  Abdomen: bowel sounds present, soft, appropriate tenderness, fundus firm   Incision: clean, dry and intact  Lower extremities: negative for cords or tenderness, trace edema bilaterally      Labs:   No results found for this or any previous visit (from the past 24 hour(s)).  Lab Results   Component Value Date/Time    HGB 9.4 2025 06:19 AM       Assessment:   Post-Op , stable  POP anemia, acute intraoperative blood loss, stable      Plan:     1. Routine post-operative care  2. Encouraged out of bed and ambulation  3. Oral pain medications  4. Advance diet  5. Continue postpartum and  teaching by nursing  6. Continue home BP log, preeclampsia precautions re-reviewed  7. Continue labetalol TID, do not resume prior BID dosing  8. Repeat PIH labs  9. Add aron Santillan MD      
Sign out given to covering [facility], Dr. Rubi.    
The current gestational age is 34 weeks and 4 days.  The patient was previously admitted due to concern for superimposed pre-eclampsia.  The patient currently is noting a persistent headache that she notes is migratory, primarily in the frontal region.  She denies scotomata.  Review of the patient's blood pressures that she took an hour on her phone are 162/102 156/93 and 154/88 and 178/93 on .  In epic there are blood pressures of 151/95 and 160/93 on .    Today her blood pressures are noted to be in the 130s to 140s over 70s to 80s range.  She is currently taking labetalol 200 mg 3 times daily    I reviewed her PC ratio is 0.2 but a 24-hour urine is pending.    I reviewed with my patient that she has significant hypertension that may represent worsening chronic hypertension alone.  I do not believe that risk-benefit assessment warrants adding a second agent are continuing to go up on the current antihypertensive given that she will be 35 weeks in a couple of days.  The patient has been previously counseled but NICU and is aware of the short-term issues of prematurity such as temperature instability feeding instability and mild respiratory.  The patient feels comfortable and understands the trade off of  morbidity versus the significant maternal risk such as a brain bleed or going blind and attempts to control the blood pressure further.  Discussion with the primary OB/GYN the current plan is for delivery at 35 weeks, which I would concur.  If she has significant sustained hypertension prior to that I would recommend proceeding towards delivery, BP > 160/110.  Likewise if she has proteinuria on her 24-hour delivery this most likely represents superimposed severe preeclampsia rather than worsening chronic hypertension and I would move towards delivery earlier than Friday.    Cezar Snyder MD  Maternal/Fetal Medicine       
Update History & Physical    The patient's History and Physical from admission was reviewed with the patient and I examined the patient. There was no change. The surgical site was confirmed by the patient and me.       Plan: The risks, benefits, expected outcome, and alternative to the recommended procedure have been discussed with the patient. Patient understands and wants to proceed with the procedure.       CS and BS today    Abx reaction reviewed: plan ancef    Electronically signed by Renee Santillan MD on 1/17/2025 at 11:09 AM    
Vitals:    01/20/25 0602 01/20/25 1009 01/20/25 1039 01/20/25 1203   BP: (!) 152/90 (!) 160/97 (!) 149/88 (!) 142/83   Pulse: 65 76 72 64   Resp: 16 17 17   Temp: 98.1 °F (36.7 °C) 98.1 °F (36.7 °C) 98.2 °F (36.8 °C) 98.2 °F (36.8 °C)   TempSrc: Oral Oral     SpO2: 99% 100% 100% 100%   Weight:       Height:         No ha/cp/sob/vision changes.    We discussed labile BP and recent labs.    We discussed options of increasing labetalol or adding procardia, which had been suggested prior to delivery due to worsening HTN.    We will add procardia, with a  dose now and qd upon discharge.    Continue BP log, notify MD if persistently elevated BP.    Consider discharge if BP remains stable.    Patient is anxious to go home if she can safely be discharged with close outpatient observation.    Renee Santillan MD    Lab Results   Component Value Date    WBC 10.3 01/20/2025    HGB 9.9 (L) 01/20/2025    HCT 30.1 (L) 01/20/2025    MCV 91.2 01/20/2025     01/20/2025     Lab Results   Component Value Date     01/20/2025    K 3.9 01/20/2025     01/20/2025    CO2 25 01/20/2025    BUN 10 01/20/2025    CREATININE 0.66 01/20/2025    GLUCOSE 98 01/20/2025    CALCIUM 8.8 01/20/2025    BILITOT 0.4 01/20/2025    ALKPHOS 96 01/20/2025    AST 24 01/20/2025    ALT 22 01/20/2025    LABGLOM >90 01/20/2025    GFRAA >60 06/02/2021    AGRATIO 0.9 (L) 06/02/2021    GLOB 3.3 01/20/2025           
Vitals:    01/20/25 1328   BP: (!) 145/84   Pulse: 66   Resp: 16   Temp: 98.1 °F (36.7 °C)   SpO2: 100%     Patient without symptoms per nurse.    Discharge planning with labetalol and procardia.    Give preeclampsia discharge instructions    BP check one week    Renee Santillan MD    
0.2 - 1.0 MG/DL    ALT 19 12 - 78 U/L    AST 18 15 - 37 U/L    Alk Phosphatase 114 45 - 117 U/L    Total Protein 5.3 (L) 6.4 - 8.2 g/dL    Albumin 2.3 (L) 3.5 - 5.0 g/dL    Globulin 3.0 2.0 - 4.0 g/dL    Albumin/Globulin Ratio 0.8 (L) 1.1 - 2.2     Rubella antibody    Collection Time: 25  4:52 AM   Result Value Ref Range    Rubella Antibody IgG 95.80  REACTIVE   IU/ML   Hepatitis B surface antigen    Collection Time: 25  4:52 AM   Result Value Ref Range    Hepatitis B Surface Ag <0.10 Index    Hep B S Ag Interp Negative NEG     CBC    Collection Time: 25  6:19 AM   Result Value Ref Range    WBC 16.1 (H) 3.6 - 11.0 K/uL    RBC 3.14 (L) 3.80 - 5.20 M/uL    Hemoglobin 9.4 (L) 11.5 - 16.0 g/dL    Hematocrit 27.6 (L) 35.0 - 47.0 %    MCV 87.9 80.0 - 99.0 FL    MCH 29.9 26.0 - 34.0 PG    MCHC 34.1 30.0 - 36.5 g/dL    RDW 13.5 11.5 - 14.5 %    Platelets 158 150 - 400 K/uL    Nucleated RBCs 0.0 0  WBC    nRBC 0.00 0.00 - 0.01 K/uL       OR Blood Loss  * No values recorded between 2025 11:32 AM and 2025 12:53 PM *    Assessment and Plan  Principal Problem:    Hypertension affecting pregnancy in third trimester  Active Problems:    35 weeks gestation of pregnancy  Resolved Problems:    * No resolved hospital problems. *   Bps now well controlled    Patient doing well 1 Day Post-Op , Low Transverse delivery.  Continue routine post-op care and maternal education.

## 2025-01-20 NOTE — PLAN OF CARE
Problem: Chronic Conditions and Co-morbidities  Goal: Patient's chronic conditions and co-morbidity symptoms are monitored and maintained or improved  Outcome: Progressing     Problem: Pain  Goal: Verbalizes/displays adequate comfort level or baseline comfort level  Outcome: Progressing  Flowsheets (Taken 2025)  Verbalizes/displays adequate comfort level or baseline comfort level:   Encourage patient to monitor pain and request assistance   Assess pain using appropriate pain scale   Administer analgesics based on type and severity of pain and evaluate response   Implement non-pharmacological measures as appropriate and evaluate response   Consider cultural and social influences on pain and pain management   Notify Licensed Independent Practitioner if interventions unsuccessful or patient reports new pain     Problem: Vaginal Birth or  Section  Goal: Fetal and maternal status remain reassuring during the birth process  Description:  Birth OB-Pregnancy care plan goal which identifies if the fetal and maternal status remain reassuring during the birth process  Outcome: Progressing     Problem: Safety - Adult  Goal: Free from fall injury  Outcome: Progressing     Problem: Cardiovascular - Adult  Goal: Maintains optimal cardiac output and hemodynamic stability  Description: INTERVENTIONS:.  -Monitor blood pressure and heart rate  -Monitor urine output and notify licensed practitioner for values outside of   -Administer fluid and /or volume expanders as ordered  -Administer vasoactive or antifibrinolytic medications as ordered  -Monitor labs and assess for signs and symptoms of volume excess or deficit  -Monitor response to interventions for patient's volume status, including labs, urine output  -Assess for signs and symptoms of bleeding or hemorrhage  -Monitor labs for bleeding or clotting disorders as ordered  Administer blood products/factors as ordered    Outcome: Progressing     Problem:

## 2025-01-21 ENCOUNTER — CARE COORDINATION (OUTPATIENT)
Dept: OTHER | Facility: CLINIC | Age: 35
End: 2025-01-21

## 2025-01-21 NOTE — CARE COORDINATION
Call within 2 business days of discharge: Yes     Patient Current Location: Home: [address] [place] [id number]    Last Discharge Facility       Date Complaint Diagnosis Description Type Department Provider    25 Hypertension S/P  Admission (Discharged) [id number] Renee Santillan MD     Maternity Care Manager contacted the patient by telephone to discuss the maternity management program.  Patient agrees to care management services at this time. Verified name and  with patient as identifiers.     Risk Factors Identified:  PRE-TERM 35W, PRIMARY C/S TUBAL, PREECLAMPSIA      Needs to be reviewed by the provider   none         Method of communication with provider : none    Advance Care Planning:   Does patient have an Advance Directive:  not on file.     Does patient have OB/Gyn Selected? Yes    Discussed follow up appointments. If no appointment was previously scheduled, appointment scheduling offered: Yes  [facility] follow up appointment(s):   Future Appointments   Date Time Provider Department Center   2025  9:30 AM Renee Santillan MD [facility] AMB   2025 12:00 PM Renee Santillan MD [facility] AMB   2025  1:10 PM Renee Santillan MD [facility] AMB     Non-BS follow up appointment(s): N/A    OB History:   OB History    Para Term  AB Living   2 2 1 1 0 2   SAB IAB Ectopic Molar Multiple Live Births           0 2      # Outcome Date GA Lbr Cruz/2nd Weight Sex Type Anes PTL Lv   2  25 35w0d  2.98 kg (6 lb 9.1 oz) M CS-LTranv Spinal N MAEGAN   1 Term 21 37w0d  3.266 kg (7 lb 3.2 oz) F CS-LTranv EPI N MAEGAN       35w0d    Medication reconciliation was performed with patient, who verbalizes understanding of administration of home medications. Advised obtaining a 90-day supply of all daily and as-needed medications.     Barriers/Support system:    Return to work planning? Yes      Postpartum

## 2025-01-27 ENCOUNTER — OFFICE VISIT (OUTPATIENT)
Age: 35
End: 2025-01-27
Payer: COMMERCIAL

## 2025-01-27 VITALS
SYSTOLIC BLOOD PRESSURE: 130 MMHG | HEART RATE: 67 BPM | DIASTOLIC BLOOD PRESSURE: 88 MMHG | BODY MASS INDEX: 29.95 KG/M2 | WEIGHT: 180 LBS

## 2025-01-27 DIAGNOSIS — Z98.891 S/P C-SECTION: ICD-10-CM

## 2025-01-27 DIAGNOSIS — Z01.30 BLOOD PRESSURE CHECK: Primary | ICD-10-CM

## 2025-01-27 DIAGNOSIS — D27.9 SEROUS CYSTADENOMA: ICD-10-CM

## 2025-01-27 DIAGNOSIS — L30.9 DERMATITIS: ICD-10-CM

## 2025-01-27 PROCEDURE — 3079F DIAST BP 80-89 MM HG: CPT | Performed by: OBSTETRICS & GYNECOLOGY

## 2025-01-27 PROCEDURE — 3075F SYST BP GE 130 - 139MM HG: CPT | Performed by: OBSTETRICS & GYNECOLOGY

## 2025-01-27 PROCEDURE — 99213 OFFICE O/P EST LOW 20 MIN: CPT | Performed by: OBSTETRICS & GYNECOLOGY

## 2025-01-27 RX ORDER — NYSTATIN 100000 U/G
OINTMENT TOPICAL
Qty: 30 G | Refills: 0 | Status: SHIPPED | OUTPATIENT
Start: 2025-01-27

## 2025-01-27 RX ORDER — NIFEDIPINE 30 MG/1
30 TABLET, EXTENDED RELEASE ORAL DAILY
Qty: 30 TABLET | Refills: 0 | Status: SHIPPED | OUTPATIENT
Start: 2025-01-27

## 2025-01-27 RX ORDER — LABETALOL 100 MG/1
100 TABLET, FILM COATED ORAL 2 TIMES DAILY
Qty: 60 TABLET | Refills: 0 | Status: SHIPPED | OUTPATIENT
Start: 2025-01-27
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
## 2025-01-27 NOTE — PROGRESS NOTES
Neeta Magdaleno is a 35 y.o. female presents for a problem visit.    Chief Complaint   Patient presents with    Blood Pressure Check     No LMP recorded.  Birth Control: none.  Last Pap: normal obtained 7/15/24    The patient is here today for a postpartum blood pressure check.  She has no complaints today.

## 2025-01-28 ENCOUNTER — OFFICE VISIT (OUTPATIENT)
Age: 35
End: 2025-01-28

## 2025-01-28 VITALS
SYSTOLIC BLOOD PRESSURE: 136 MMHG | HEART RATE: 55 BPM | DIASTOLIC BLOOD PRESSURE: 89 MMHG | RESPIRATION RATE: 18 BRPM | WEIGHT: 185.2 LBS | HEIGHT: 65 IN | BODY MASS INDEX: 30.86 KG/M2 | TEMPERATURE: 98.4 F | OXYGEN SATURATION: 99 %

## 2025-01-28 DIAGNOSIS — O16.3 HYPERTENSION AFFECTING PREGNANCY IN THIRD TRIMESTER: ICD-10-CM

## 2025-01-28 DIAGNOSIS — F98.8 ATTENTION DEFICIT DISORDER, UNSPECIFIED TYPE: ICD-10-CM

## 2025-01-28 DIAGNOSIS — Z76.89 ENCOUNTER TO ESTABLISH CARE: Primary | ICD-10-CM

## 2025-01-28 SDOH — HEALTH STABILITY: PHYSICAL HEALTH: ON AVERAGE, HOW MANY DAYS PER WEEK DO YOU ENGAGE IN MODERATE TO STRENUOUS EXERCISE (LIKE A BRISK WALK)?: 4 DAYS

## 2025-01-28 SDOH — ECONOMIC STABILITY: FOOD INSECURITY: WITHIN THE PAST 12 MONTHS, THE FOOD YOU BOUGHT JUST DIDN'T LAST AND YOU DIDN'T HAVE MONEY TO GET MORE.: NEVER TRUE

## 2025-01-28 SDOH — ECONOMIC STABILITY: FOOD INSECURITY: WITHIN THE PAST 12 MONTHS, YOU WORRIED THAT YOUR FOOD WOULD RUN OUT BEFORE YOU GOT MONEY TO BUY MORE.: NEVER TRUE

## 2025-01-28 SDOH — HEALTH STABILITY: PHYSICAL HEALTH: ON AVERAGE, HOW MANY MINUTES DO YOU ENGAGE IN EXERCISE AT THIS LEVEL?: 20 MIN

## 2025-01-28 ASSESSMENT — PATIENT HEALTH QUESTIONNAIRE - PHQ9
SUM OF ALL RESPONSES TO PHQ QUESTIONS 1-9: 0
1. LITTLE INTEREST OR PLEASURE IN DOING THINGS: NOT AT ALL
SUM OF ALL RESPONSES TO PHQ QUESTIONS 1-9: 0
SUM OF ALL RESPONSES TO PHQ QUESTIONS 1-9: 0
2. FEELING DOWN, DEPRESSED OR HOPELESS: NOT AT ALL
SUM OF ALL RESPONSES TO PHQ QUESTIONS 1-9: 0
SUM OF ALL RESPONSES TO PHQ9 QUESTIONS 1 & 2: 0

## 2025-01-28 NOTE — PROGRESS NOTES
The patient identity was confirmed with  and First/Last Name. Medications and Allergies reviewed with patient, as well as any new diagnosis/procedures. Depression Screening and SDOH Screening done today.    Chief Complaint   Patient presents with    New Patient    Discuss Medications        Vitals:    25 0958   BP: 136/89   Pulse: 55   Resp: 18   Temp: 98.4 °F (36.9 °C)   SpO2: 99%       Health Maintenance Due   Topic Date Due    Depression Monitoring  Never done    Varicella vaccine (1 of 2 - 13+ 2-dose series) Never done    Hepatitis B vaccine (1 of 3 - 19+ 3-dose series) Never done    COVID-19 Vaccine ( season) Never done          \"Have you been to the ER, urgent care clinic since your last visit?  Hospitalized since your last visit?\"    YES - When: approximately 6 days ago.  Where and Why: Obstetrics and Gynecology- Hypertension affecting pregnancy in third trimester, as well as  Birth.    “Have you seen or consulted any other health care providers outside our system since your last visit?”    NO

## 2025-01-28 NOTE — PROGRESS NOTES
CC:  Chief Complaint   Patient presents with    New Patient    Discuss Medications       HPI:  Neeta Magdaleno is a 35 y.o. year old female who presents to the clinic to establish care.    She was previously a patient at [facility], but now her insurance no longer covers the providers at that practice.    She recently had a  on 25. Her pregnancy was complicated by gestational hypertension. She was seen by OBGYN on 25 for blood pressure check and her labetalol was decreased from 200mg twice a day to 100mg twice a day. Her blood pressure in the office today is 136/89. Denies chest pain, leg edema, headaches, vision changes.    She also has ongoing chronic ADD which is well-managed on Wellbutrin 150mg daily.     Health Maintenance:  - pap - 7/15/2024, next due 7/15/2029    Reviewed PmHx, RxHx, FmHx, SocHx, AllgHx and updated in chart.    ROS:  General:  Denies weight changes, fever, headache  Opthalmologic:  Denies blurred vision, changes in vision  ENT:  Denies difficulty swallowing, decreased hearing  Respiratory:  Denies shortness of breath, cough, wheezing  Cardiovascular:  Denies chest pain, edema  Gastrointestinal:  Denies abdominal pain, nausea and vomiting  Neurological:  Denies dizziness, fainting  Psychiatric:  Denies anxiety, depression, suicidal thoughts, mood changes  PHQ-9 = 2  CHINTAN-7 = 3         Vitals:    25 0958   BP: 136/89   Site: Right Upper Arm   Position: Sitting   Cuff Size: Medium Adult   Pulse: 55   Resp: 18   Temp: 98.4 °F (36.9 °C)   TempSrc: Temporal   SpO2: 99%   Weight: 84 kg (185 lb 3.2 oz)   Height: 1.651 m (5' 5\")       PHYSICAL EXAM:  General:  Alert and oriented x 3. No acute distress  Head:  Normocephalic. Atraumatic  Eyes:  Pupils PERRLA. EOMs intact. Conjunctiva clear  ENT:  Oropharynx without erythema. Exudates not present. Tongue in midline  Ears:  BOTH EARS tympanic membrane intact.   Neck/Thyroid:  Neck supple. Full range of motion. No

## 2025-02-04 ENCOUNTER — CARE COORDINATION (OUTPATIENT)
Dept: OTHER | Facility: CLINIC | Age: 35
End: 2025-02-04

## 2025-02-04 NOTE — CARE COORDINATION
Call within 2 business days of discharge: Yes     Patient Current Location: Home: [address] [place] [id number]    Last Discharge Facility       Date Complaint Diagnosis Description Type Department Provider    25 Hypertension S/P  Admission (Discharged) [id number] Renee Santillan MD     Maternity Care Manager contacted the patient by telephone to discuss the maternity management program.  Patient agrees to care management services at this time. Verified name and  with patient as identifiers.     Risk Factors Identified:  chtn repeat c/s, pre-term delivery 35w      Needs to be reviewed by the provider   none         Method of communication with provider : none    Advance Care Planning:   Does patient have an Advance Directive:  not on file.     Does patient have OB/Gyn Selected? Yes    Discussed follow up appointments. If no appointment was previously scheduled, appointment scheduling offered: Yes  [facility] follow up appointment(s):   Future Appointments   Date Time Provider Department Center   2/10/2025 10:20 AM Renee Santillan MD [facility] AMB   2025  1:10 PM Renee Santillan MD [facility] AMB     Non-[facility] follow up appointment(s): n/a    OB History:   OB History    Para Term  AB Living   2 2 1 1 0 2   SAB IAB Ectopic Molar Multiple Live Births           0 2      # Outcome Date GA Lbr Cruz/2nd Weight Sex Type Anes PTL Lv   2  25 35w0d  2.98 kg (6 lb 9.1 oz) M CS-LTranv Spinal N MAEGAN   1 Term 21 37w0d  3.266 kg (7 lb 3.2 oz) F CS-LTranv EPI N MAEGAN       Unknown    Medication reconciliation was performed with patient, who verbalizes understanding of administration of home medications. Advised obtaining a 90-day supply of all daily and as-needed medications.     Barriers/Support system:  spouse  Return to work planning? Yes  May 2025    Postpartum Assessment: 3w pp. S/W pt today for follow-up pp call. Pt reports doing well thus far. She has

## 2025-02-07 NOTE — PROGRESS NOTES
Rooming note, gyn problem visit:    Chief Complaint   Patient presents with    Blood Pressure Check     Neeta Magdaleno is a 35 y.o. female presents for a problem visit.    Patient's last menstrual period was 04/20/2024.  Birth Control: none.    Last or next WWE is: 10/5/2021    The patient is reporting having: follow up today from office visit on 1/27/2025 for blood pressure check  This is not a new problem.  She has experienced this problem before.    She reports the symptoms are is unchanged.  Aggravating factors include none.  And alleviating factors include none.    Patient reports bruising around incision.       1. Have you been to the ER, urgent care clinic, or hospitalized since your last visit?{  no    2. Have you seen or consulted any other health care providers outside of the [facility] since your last visit?  no

## 2025-02-10 ENCOUNTER — OFFICE VISIT (OUTPATIENT)
Age: 35
End: 2025-02-10
Payer: COMMERCIAL

## 2025-02-10 VITALS — DIASTOLIC BLOOD PRESSURE: 85 MMHG | WEIGHT: 186 LBS | BODY MASS INDEX: 30.95 KG/M2 | SYSTOLIC BLOOD PRESSURE: 121 MMHG

## 2025-02-10 DIAGNOSIS — Z98.891 S/P C-SECTION: ICD-10-CM

## 2025-02-10 DIAGNOSIS — L76.82 INCISIONAL PAIN: ICD-10-CM

## 2025-02-10 DIAGNOSIS — G43.901 MIGRAINE WITH STATUS MIGRAINOSUS, NOT INTRACTABLE, UNSPECIFIED MIGRAINE TYPE: ICD-10-CM

## 2025-02-10 DIAGNOSIS — Z01.30 BLOOD PRESSURE CHECK: Primary | ICD-10-CM

## 2025-02-10 PROCEDURE — 99213 OFFICE O/P EST LOW 20 MIN: CPT | Performed by: OBSTETRICS & GYNECOLOGY

## 2025-02-10 PROCEDURE — 3074F SYST BP LT 130 MM HG: CPT | Performed by: OBSTETRICS & GYNECOLOGY

## 2025-02-10 PROCEDURE — 3079F DIAST BP 80-89 MM HG: CPT | Performed by: OBSTETRICS & GYNECOLOGY

## 2025-02-10 RX ORDER — NIFEDIPINE 30 MG/1
30 TABLET, EXTENDED RELEASE ORAL DAILY
Qty: 30 TABLET | Refills: 0 | Status: SHIPPED | OUTPATIENT
Start: 2025-02-10
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
## 2025-02-27 ENCOUNTER — POSTPARTUM VISIT (OUTPATIENT)
Age: 35
End: 2025-02-27

## 2025-02-27 VITALS — DIASTOLIC BLOOD PRESSURE: 81 MMHG | SYSTOLIC BLOOD PRESSURE: 125 MMHG | BODY MASS INDEX: 30.85 KG/M2 | WEIGHT: 185.4 LBS

## 2025-02-27 PROCEDURE — 0503F POSTPARTUM CARE VISIT: CPT | Performed by: OBSTETRICS & GYNECOLOGY

## 2025-02-27 RX ORDER — NIFEDIPINE 30 MG/1
30 TABLET, EXTENDED RELEASE ORAL DAILY
Qty: 90 TABLET | Refills: 0 | Status: SHIPPED | OUTPATIENT
Start: 2025-02-27

## 2025-02-27 NOTE — PROGRESS NOTES
Neeta Magdaleno is a 35 y.o. female returns for a routine post-partum follow-up visit     Chief Complaint   Patient presents with    Postpartum Care       Postpartum Depression: Medium Risk (2025)    Counce  Depression Scale     Last EPDS Total Score: 6     Last EPDS Self Harm Result: Never         Type of delivery: repeat  section  Date of Delivery: 2025  Breastfeeding: no  Bleeding Resolved: yes  Birth Control: bilateral salpingectomy  Last Pap: see report obtained 1 year(s) ago.        Problems: no problems    1. Have you been to the ER, urgent care clinic, or hospitalized since your last visit? No    2. Have you seen or consulted any other health care providers outside of the [facility] since your last visit? No    Examination chaperoned by Halle Armijo LPN.
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
## 2025-03-19 ENCOUNTER — CARE COORDINATION (OUTPATIENT)
Dept: OTHER | Facility: CLINIC | Age: 35
End: 2025-03-19

## 2025-03-19 NOTE — CARE COORDINATION
Call within 2 business days of discharge: Yes     Patient Current Location: Home: [address] [place] [id number]    Last Discharge Facility       Date Complaint Diagnosis Description Type Department Provider    25 Hypertension S/P  Admission (Discharged) [id number] Renee Santillan MD     Maternity Care Manager contacted the patient by telephone to discuss the maternity management program.  Patient agrees to care management services at this time. Verified name and  with patient as identifiers.     Risk Factors Identified:  REPEAT C/S      Needs to be reviewed by the provider   none         Method of communication with provider : none    Advance Care Planning:   Does patient have an Advance Directive:  not on file.     Does patient have OB/Gyn Selected? Yes    Discussed follow up appointments. If no appointment was previously scheduled, appointment scheduling offered: Yes  [facility] follow up appointment(s):   Future Appointments   Date Time Provider Department Center   2025  3:00 PM Parris Yap MD [facility] AMB     Non-[facility] follow up appointment(s): N/A    OB History:   OB History    Para Term  AB Living   2 2 1 1 0 2   SAB IAB Ectopic Molar Multiple Live Births       0 2      # Outcome Date GA Lbr Cruz/2nd Weight Sex Type Anes PTL Lv   2  25 35w0d  2.98 kg (6 lb 9.1 oz) M CS-LTranv Spinal N MAEGAN   1 Term 21 37w0d  3.266 kg (7 lb 3.2 oz) F CS-LTranv EPI N MAEGAN       Unknown    Medication reconciliation was performed with patient, who verbalizes understanding of administration of home medications. Advised obtaining a 90-day supply of all daily and as-needed medications.     Barriers/Support system:  spouse  Return to work planning? Yes  May 8th        Postpartum Assessment:  S/w pt today she is doing well she is now 8w pp. Her 6w pp visit went well and she was released back to regular activities. The pt is still formula feeding with no concerns

## 2025-04-07 ENCOUNTER — OFFICE VISIT (OUTPATIENT)
Age: 35
End: 2025-04-07
Payer: COMMERCIAL

## 2025-04-07 VITALS
HEIGHT: 65 IN | OXYGEN SATURATION: 100 % | WEIGHT: 186 LBS | DIASTOLIC BLOOD PRESSURE: 78 MMHG | RESPIRATION RATE: 19 BRPM | BODY MASS INDEX: 30.99 KG/M2 | TEMPERATURE: 98 F | HEART RATE: 64 BPM | SYSTOLIC BLOOD PRESSURE: 131 MMHG

## 2025-04-07 DIAGNOSIS — I10 ESSENTIAL HYPERTENSION: ICD-10-CM

## 2025-04-07 DIAGNOSIS — Z13.29 SCREENING FOR THYROID DISORDER: ICD-10-CM

## 2025-04-07 DIAGNOSIS — Z13.220 SCREENING CHOLESTEROL LEVEL: ICD-10-CM

## 2025-04-07 DIAGNOSIS — E55.9 VITAMIN D DEFICIENCY: ICD-10-CM

## 2025-04-07 DIAGNOSIS — F98.8 ATTENTION DEFICIT DISORDER, UNSPECIFIED TYPE: ICD-10-CM

## 2025-04-07 DIAGNOSIS — Z13.1 SCREENING FOR DIABETES MELLITUS: ICD-10-CM

## 2025-04-07 DIAGNOSIS — Z00.00 ENCOUNTER FOR GENERAL ADULT MEDICAL EXAMINATION WITHOUT ABNORMAL FINDINGS: Primary | ICD-10-CM

## 2025-04-07 PROBLEM — Z3A.33 33 WEEKS GESTATION OF PREGNANCY: Status: RESOLVED | Noted: 2025-01-07 | Resolved: 2025-04-07

## 2025-04-07 PROBLEM — O09.529 ANTEPARTUM MULTIGRAVIDA OF ADVANCED MATERNAL AGE: Chronic | Status: RESOLVED | Noted: 2024-07-15 | Resolved: 2025-04-07

## 2025-04-07 PROBLEM — Z3A.35 35 WEEKS GESTATION OF PREGNANCY: Status: RESOLVED | Noted: 2025-01-17 | Resolved: 2025-04-07

## 2025-04-07 PROBLEM — O10.013 PRE-EXISTING ESSENTIAL HYPERTENSION DURING PREGNANCY IN THIRD TRIMESTER: Status: RESOLVED | Noted: 2024-12-27 | Resolved: 2025-04-07

## 2025-04-07 PROBLEM — O10.013 PRE-EXISTING ESSENTIAL HYPERTENSION COMPLICATING PREGNANCY IN THIRD TRIMESTER: Status: RESOLVED | Noted: 2025-01-07 | Resolved: 2025-04-07

## 2025-04-07 PROBLEM — O16.3 HYPERTENSION AFFECTING PREGNANCY IN THIRD TRIMESTER: Status: RESOLVED | Noted: 2024-12-27 | Resolved: 2025-04-07

## 2025-04-07 PROCEDURE — 3075F SYST BP GE 130 - 139MM HG: CPT

## 2025-04-07 PROCEDURE — 3078F DIAST BP <80 MM HG: CPT

## 2025-04-07 PROCEDURE — 99395 PREV VISIT EST AGE 18-39: CPT

## 2025-04-07 NOTE — PROGRESS NOTES
The patient identity was confirmed with  and First/Last Name. Medications and Allergies reviewed with patient, as well as any new diagnosis/procedures.     Chief Complaint   Patient presents with    Employment Physical        Vitals:    25 0950   BP: 131/78   Pulse: 64   Resp: 19   Temp: 98 °F (36.7 °C)   SpO2: 100%       Health Maintenance Due   Topic Date Due    Varicella vaccine (1 of 2 - 13+ 2-dose series) Never done    Hepatitis B vaccine (1 of 3 - 19+ 3-dose series) Never done    COVID-19 Vaccine ( season) Never done          \"Have you been to the ER, urgent care clinic since your last visit?  Hospitalized since your last visit?\"    NO    “Have you seen or consulted any other health care providers outside our system since your last visit?”    NO            
  Cardiovascular:  Regular rate and rhythm. No murmurs, rubs or gallops.   Pulmonary:  Clear to auscultation bilaterally. No wheezing, rhonchi or rales. Good air movement.  Abdominal:  Soft, nontender, nondistended.   Skin:  Warm and dry. No rash or suspicious lesions on exposed skin.  Neurological:  Nonfocal. 5/5 strength throughout.   Psychiatric:  Good eye contact. Mood/affect full range. Speech clear. Cooperative with exam.         ASSESSMENT/PLAN:    1. Encounter for general adult medical examination without abnormal findings  -     CBC with Auto Differential; Future  -     Comprehensive Metabolic Panel; Future  -     Lipid Panel; Future  -     Hemoglobin A1C; Future  -     TSH; Future  2. Essential hypertension  Assessment & Plan:   Chronic, at goal (stable), continue current treatment plan  3. Attention deficit disorder, unspecified type  Assessment & Plan:   Chronic, at goal (stable), continue current treatment plan  4. Screening for thyroid disorder  -     TSH; Future  5. Screening for diabetes mellitus  -     Hemoglobin A1C; Future  6. Screening cholesterol level  -     Lipid Panel; Future  7. Vitamin D deficiency  -     Vitamin D 25 Hydroxy; Future       Return in about 1 year (around 4/7/2026) for annual wellness exam.    I have discussed the diagnosis with the patient and the intended plan as seen in the above orders.  The patient has received an after-visit summary and questions were answered concerning future plans.       Keshia Polo, PARIS-BC

## 2025-04-08 LAB
25(OH)D3 SERPL-MCNC: 30.5 NG/ML (ref 30–100)
ALBUMIN SERPL-MCNC: 4.4 G/DL (ref 3.5–5)
ALBUMIN/GLOB SERPL: 1.7 (ref 1.1–2.2)
ALP SERPL-CCNC: 69 U/L (ref 45–117)
ALT SERPL-CCNC: 32 U/L (ref 12–78)
ANION GAP SERPL CALC-SCNC: 5 MMOL/L (ref 2–12)
AST SERPL-CCNC: 20 U/L (ref 15–37)
BASOPHILS # BLD: 0.05 K/UL (ref 0–0.1)
BASOPHILS NFR BLD: 1 % (ref 0–1)
BILIRUB SERPL-MCNC: 0.6 MG/DL (ref 0.2–1)
BUN SERPL-MCNC: 22 MG/DL (ref 6–20)
BUN/CREAT SERPL: 36 (ref 12–20)
CALCIUM SERPL-MCNC: 8.8 MG/DL (ref 8.5–10.1)
CHLORIDE SERPL-SCNC: 112 MMOL/L (ref 97–108)
CHOLEST SERPL-MCNC: 201 MG/DL
CO2 SERPL-SCNC: 23 MMOL/L (ref 21–32)
CREAT SERPL-MCNC: 0.61 MG/DL (ref 0.55–1.02)
DIFFERENTIAL METHOD BLD: ABNORMAL
EOSINOPHIL # BLD: 0.15 K/UL (ref 0–0.4)
EOSINOPHIL NFR BLD: 3.1 % (ref 0–7)
ERYTHROCYTE [DISTWIDTH] IN BLOOD BY AUTOMATED COUNT: 12.9 % (ref 11.5–14.5)
EST. AVERAGE GLUCOSE BLD GHB EST-MCNC: 111 MG/DL
GLOBULIN SER CALC-MCNC: 2.6 G/DL (ref 2–4)
GLUCOSE SERPL-MCNC: 108 MG/DL (ref 65–100)
HBA1C MFR BLD: 5.5 % (ref 4–5.6)
HCT VFR BLD AUTO: 38.5 % (ref 35–47)
HDLC SERPL-MCNC: 47 MG/DL
HDLC SERPL: 4.3 (ref 0–5)
HGB BLD-MCNC: 12.6 G/DL (ref 11.5–16)
IMM GRANULOCYTES # BLD AUTO: 0.02 K/UL (ref 0–0.04)
IMM GRANULOCYTES NFR BLD AUTO: 0.4 % (ref 0–0.5)
LDLC SERPL CALC-MCNC: 130.2 MG/DL (ref 0–100)
LYMPHOCYTES # BLD: 1.58 K/UL (ref 0.8–3.5)
LYMPHOCYTES NFR BLD: 32.4 % (ref 12–49)
MCH RBC QN AUTO: 28.7 PG (ref 26–34)
MCHC RBC AUTO-ENTMCNC: 32.7 G/DL (ref 30–36.5)
MCV RBC AUTO: 87.7 FL (ref 80–99)
MONOCYTES # BLD: 0.32 K/UL (ref 0–1)
MONOCYTES NFR BLD: 6.6 % (ref 5–13)
NEUTS SEG # BLD: 2.76 K/UL (ref 1.8–8)
NEUTS SEG NFR BLD: 56.5 % (ref 32–75)
NRBC # BLD: 0 K/UL (ref 0–0.01)
NRBC BLD-RTO: 0 PER 100 WBC
PLATELET # BLD AUTO: 218 K/UL (ref 150–400)
PMV BLD AUTO: 13 FL (ref 8.9–12.9)
POTASSIUM SERPL-SCNC: 4.3 MMOL/L (ref 3.5–5.1)
PROT SERPL-MCNC: 7 G/DL (ref 6.4–8.2)
RBC # BLD AUTO: 4.39 M/UL (ref 3.8–5.2)
SODIUM SERPL-SCNC: 140 MMOL/L (ref 136–145)
TRIGL SERPL-MCNC: 119 MG/DL
TSH SERPL DL<=0.05 MIU/L-ACNC: 0.68 UIU/ML (ref 0.36–3.74)
VLDLC SERPL CALC-MCNC: 23.8 MG/DL
WBC # BLD AUTO: 4.9 K/UL (ref 3.6–11)

## 2025-04-09 ENCOUNTER — RESULTS FOLLOW-UP (OUTPATIENT)
Age: 35
End: 2025-04-09

## 2025-04-09 DIAGNOSIS — E55.9 VITAMIN D DEFICIENCY: Primary | ICD-10-CM

## 2025-04-09 RX ORDER — ERGOCALCIFEROL 1.25 MG/1
50000 CAPSULE, LIQUID FILLED ORAL WEEKLY
Qty: 12 CAPSULE | Refills: 1 | Status: SHIPPED | OUTPATIENT
Start: 2025-04-09

## 2025-05-16 ENCOUNTER — TELEPHONE (OUTPATIENT)
Age: 35
End: 2025-05-16

## 2025-05-16 DIAGNOSIS — F32.1 CURRENT MODERATE EPISODE OF MAJOR DEPRESSIVE DISORDER, UNSPECIFIED WHETHER RECURRENT (HCC): Primary | ICD-10-CM

## 2025-05-16 RX ORDER — BUPROPION HYDROCHLORIDE 150 MG/1
150 TABLET ORAL EVERY MORNING
Qty: 90 TABLET | Refills: 1 | Status: SHIPPED | OUTPATIENT
Start: 2025-05-16

## 2025-05-16 NOTE — TELEPHONE ENCOUNTER
Patient is requesting refills on Wellbutrin medication sent to [facility] [address] [facility]  [phone]      buPROPion (WELLBUTRIN XL) 150 MG extended release tablet

## 2025-07-29 RX ORDER — NIFEDIPINE 30 MG/1
30 TABLET, EXTENDED RELEASE ORAL DAILY
Qty: 90 TABLET | Refills: 0 | Status: SHIPPED | OUTPATIENT
Start: 2025-07-29

## 2025-07-29 NOTE — TELEPHONE ENCOUNTER
Refill request (pt calling)      NIFEdipine (PROCARDIA XL) 30 MG extended release tablet     [facility]

## (undated) DEVICE — POOLE SUCTION INSTRUMENT WITH REMOVABLE SHEATH: Brand: POOLE

## (undated) DEVICE — PENCIL ES L3M ROCK SWCH S STL HEX LOK BLDE ELECTRD HOLSTER

## (undated) DEVICE — TRAY,URINE METER,100% SILICONE,16FR10ML: Brand: MEDLINE

## (undated) DEVICE — GARMENT,MEDLINE,DVT,INT,CALF,MED, GEN2: Brand: MEDLINE

## (undated) DEVICE — DRESSING BORDERED ADH GZ UNIV GEN USE 8INX4IN AND 6INX2IN

## (undated) DEVICE — REM POLYHESIVE ADULT PATIENT RETURN ELECTRODE: Brand: VALLEYLAB

## (undated) DEVICE — SUTURE MONOCRYL SZ 3-0 L27IN ABSRB UD L60MM KS STR REV CUT Y523H

## (undated) DEVICE — TIP CLEANER: Brand: VALLEYLAB

## (undated) DEVICE — Z INACTIVE NO ACTIVE SUPPLIER APPLICATOR MEDICATED 26 CC TINT HI-LITE ORNG STRL CHLORAPREP

## (undated) DEVICE — BLADE CLIPPER GEN PURP NS

## (undated) DEVICE — SPONGE: LAP 18X18 W  200/CS: Brand: MEDICAL ACTION INDUSTRIES

## (undated) DEVICE — STAPLER SKIN H3.9MM WIRE DIA0.58MM CRWN 6.9MM 35 STPL ROT

## (undated) DEVICE — 3000CC GUARDIAN II: Brand: GUARDIAN

## (undated) DEVICE — SOLIDIFIER FLUID 3000 CC ABSORB

## (undated) DEVICE — TELFA ADHESIVE ISLAND DRESSING: Brand: TELFA

## (undated) DEVICE — Z DISCONTINUED USE 2220179 SUTURE VCRL SZ 0 L36IN ABSRB VLT L40MM CT 1/2 CIR J358H

## (undated) DEVICE — TOWEL,OR,DSP,ST,BLUE,STD,2/PK,40PK/CS: Brand: MEDLINE

## (undated) DEVICE — INTENT OT USE PROVIDES A STERILE INTERFACE BETWEEN THE OPERATING ROOM SURGICAL LAMPS (NON-STERILE) AND THE SURGEON OR STAFF WORKING IN THE STERILE FIELD.: Brand: ASPEN® ALC PLUS LIGHT HANDLE COVER

## (undated) DEVICE — SUTURE ABSRB BRAID COAT UD CT NO 1 36IN VCRL J959H

## (undated) DEVICE — SUTURE MCRYL SZ 3-0 L27IN ABSRB UD L60MM KS STR REV CUT Y523H

## (undated) DEVICE — ROCKER SWITCH PENCIL HOLSTER: Brand: VALLEYLAB

## (undated) DEVICE — CLEANER ES TIP W2XL2IN ADH BK RADPQ FOR S STL ELECTRD

## (undated) DEVICE — STERILE LATEX POWDER-FREE SURGICAL GLOVESWITH NITRILE COATING: Brand: PROTEXIS

## (undated) DEVICE — SUTURE VCRL SZ 0 L36IN ABSRB VLT L40MM CT 1/2 CIR J358H

## (undated) DEVICE — C-SECTION II-LF: Brand: MEDLINE INDUSTRIES, INC.

## (undated) DEVICE — BLADE ASSEMB CLP HAIR FINE --

## (undated) DEVICE — (D)PREP SKN CHLRAPRP APPL 26ML -- CONVERT TO ITEM 371833

## (undated) DEVICE — Z DUP USE 2271313 SOLIDIFIER FLUID 3000 CC ABSORB

## (undated) DEVICE — SYRINGE IRRIG 60ML SFT PLIABLE BLB EZ TO GRP 1 HND USE W/

## (undated) DEVICE — ELECTRODE PT RET AD L9FT HI MOIST COND ADH HYDRGEL CORDED

## (undated) DEVICE — SOLUTION IV 1000ML 0.9% SOD CHL

## (undated) DEVICE — SUTURE VICRYL ABSRB BRAID COAT UD CT NO 1 36IN  J959H

## (undated) DEVICE — HANDLE LT SNAP ON ULT DURABLE LENS FOR TRUMPF ALC DISPOSABLE